# Patient Record
Sex: FEMALE | Race: WHITE | NOT HISPANIC OR LATINO | Employment: OTHER | URBAN - NONMETROPOLITAN AREA
[De-identification: names, ages, dates, MRNs, and addresses within clinical notes are randomized per-mention and may not be internally consistent; named-entity substitution may affect disease eponyms.]

---

## 2017-04-20 ENCOUNTER — APPOINTMENT (OUTPATIENT)
Dept: GENERAL RADIOLOGY | Facility: HOSPITAL | Age: 77
End: 2017-04-20

## 2017-04-20 ENCOUNTER — HOSPITAL ENCOUNTER (EMERGENCY)
Facility: HOSPITAL | Age: 77
Discharge: HOME | End: 2017-04-20
Attending: EMERGENCY MEDICINE | Admitting: EMERGENCY MEDICINE

## 2017-04-20 ENCOUNTER — APPOINTMENT (OUTPATIENT)
Dept: CT IMAGING | Facility: HOSPITAL | Age: 77
End: 2017-04-20

## 2017-04-20 VITALS
HEIGHT: 59 IN | TEMPERATURE: 98.9 F | BODY MASS INDEX: 37.29 KG/M2 | HEART RATE: 62 BPM | DIASTOLIC BLOOD PRESSURE: 68 MMHG | OXYGEN SATURATION: 97 % | SYSTOLIC BLOOD PRESSURE: 145 MMHG | WEIGHT: 185 LBS | RESPIRATION RATE: 18 BRPM

## 2017-04-20 DIAGNOSIS — N20.0 RENAL STONE: ICD-10-CM

## 2017-04-20 DIAGNOSIS — N28.1 RENAL CYST: ICD-10-CM

## 2017-04-20 DIAGNOSIS — D25.1 FIBROIDS, INTRAMURAL: ICD-10-CM

## 2017-04-20 DIAGNOSIS — K52.9 ENTERITIS: ICD-10-CM

## 2017-04-20 DIAGNOSIS — R11.0 NAUSEA: Primary | ICD-10-CM

## 2017-04-20 LAB
ALBUMIN SERPL-MCNC: 3.6 G/DL (ref 3.5–5)
ALBUMIN/GLOB SERPL: 1.6 G/DL (ref 1.1–2.5)
ALP SERPL-CCNC: 89 U/L (ref 24–120)
ALT SERPL W P-5'-P-CCNC: 20 U/L (ref 0–54)
AMYLASE SERPL-CCNC: 57 U/L (ref 30–110)
ANION GAP SERPL CALCULATED.3IONS-SCNC: 13 MMOL/L (ref 4–13)
APTT PPP: 28 SECONDS (ref 24.1–34.8)
AST SERPL-CCNC: 27 U/L (ref 7–45)
BASOPHILS # BLD AUTO: 0.01 10*3/MM3 (ref 0–0.2)
BASOPHILS NFR BLD AUTO: 0.2 % (ref 0–2)
BILIRUB SERPL-MCNC: 0.8 MG/DL (ref 0.1–1)
BILIRUB UR QL STRIP: NEGATIVE
BUN BLD-MCNC: 18 MG/DL (ref 5–21)
BUN/CREAT SERPL: 18.6 (ref 7–25)
CALCIUM SPEC-SCNC: 9.4 MG/DL (ref 8.4–10.4)
CHLORIDE SERPL-SCNC: 102 MMOL/L (ref 98–110)
CLARITY UR: CLEAR
CO2 SERPL-SCNC: 27 MMOL/L (ref 24–31)
COLOR UR: YELLOW
CREAT BLD-MCNC: 0.97 MG/DL (ref 0.5–1.4)
D-LACTATE SERPL-SCNC: 0.9 MMOL/L (ref 0.5–2)
DEPRECATED RDW RBC AUTO: 48.3 FL (ref 40–54)
EOSINOPHIL # BLD AUTO: 0.06 10*3/MM3 (ref 0–0.7)
EOSINOPHIL NFR BLD AUTO: 1.1 % (ref 0–4)
ERYTHROCYTE [DISTWIDTH] IN BLOOD BY AUTOMATED COUNT: 14.1 % (ref 12–15)
GFR SERPL CREATININE-BSD FRML MDRD: 56 ML/MIN/1.73
GLOBULIN UR ELPH-MCNC: 2.3 GM/DL
GLUCOSE BLD-MCNC: 84 MG/DL (ref 70–100)
GLUCOSE UR STRIP-MCNC: NEGATIVE MG/DL
HCT VFR BLD AUTO: 39.9 % (ref 37–47)
HGB BLD-MCNC: 13.1 G/DL (ref 12–16)
HGB UR QL STRIP.AUTO: NEGATIVE
IMM GRANULOCYTES # BLD: 0.01 10*3/MM3 (ref 0–0.03)
IMM GRANULOCYTES NFR BLD: 0.2 % (ref 0–5)
INR PPP: 0.92 (ref 0.91–1.09)
KETONES UR QL STRIP: NEGATIVE
LEUKOCYTE ESTERASE UR QL STRIP.AUTO: NEGATIVE
LIPASE SERPL-CCNC: 81 U/L (ref 23–203)
LYMPHOCYTES # BLD AUTO: 0.76 10*3/MM3 (ref 0.72–4.86)
LYMPHOCYTES NFR BLD AUTO: 14.1 % (ref 15–45)
MCH RBC QN AUTO: 30.8 PG (ref 28–32)
MCHC RBC AUTO-ENTMCNC: 32.8 G/DL (ref 33–36)
MCV RBC AUTO: 93.9 FL (ref 82–98)
MONOCYTES # BLD AUTO: 0.31 10*3/MM3 (ref 0.19–1.3)
MONOCYTES NFR BLD AUTO: 5.8 % (ref 4–12)
NEUTROPHILS # BLD AUTO: 4.23 10*3/MM3 (ref 1.87–8.4)
NEUTROPHILS NFR BLD AUTO: 78.6 % (ref 39–78)
NITRITE UR QL STRIP: NEGATIVE
PH UR STRIP.AUTO: 6.5 [PH] (ref 5–8)
PLATELET # BLD AUTO: 171 10*3/MM3 (ref 130–400)
PMV BLD AUTO: 11.3 FL (ref 6–12)
POTASSIUM BLD-SCNC: 3.5 MMOL/L (ref 3.5–5.3)
PROT SERPL-MCNC: 5.9 G/DL (ref 6.3–8.7)
PROT UR QL STRIP: NEGATIVE
PROTHROMBIN TIME: 12.6 SECONDS (ref 11.9–14.6)
RBC # BLD AUTO: 4.25 10*6/MM3 (ref 4.2–5.4)
SODIUM BLD-SCNC: 142 MMOL/L (ref 135–145)
SP GR UR STRIP: 1.01 (ref 1–1.03)
TROPONIN I SERPL-MCNC: 0 NG/ML (ref 0–0.07)
UROBILINOGEN UR QL STRIP: NORMAL
WBC NRBC COR # BLD: 5.38 10*3/MM3 (ref 4.8–10.8)

## 2017-04-20 PROCEDURE — 99285 EMERGENCY DEPT VISIT HI MDM: CPT

## 2017-04-20 PROCEDURE — 71010 HC CHEST PA OR AP: CPT

## 2017-04-20 PROCEDURE — 83605 ASSAY OF LACTIC ACID: CPT | Performed by: EMERGENCY MEDICINE

## 2017-04-20 PROCEDURE — 85610 PROTHROMBIN TIME: CPT | Performed by: EMERGENCY MEDICINE

## 2017-04-20 PROCEDURE — 96361 HYDRATE IV INFUSION ADD-ON: CPT

## 2017-04-20 PROCEDURE — 0 IOPAMIDOL PER 1 ML: Performed by: EMERGENCY MEDICINE

## 2017-04-20 PROCEDURE — 25010000002 ONDANSETRON PER 1 MG: Performed by: EMERGENCY MEDICINE

## 2017-04-20 PROCEDURE — 83690 ASSAY OF LIPASE: CPT | Performed by: EMERGENCY MEDICINE

## 2017-04-20 PROCEDURE — 96374 THER/PROPH/DIAG INJ IV PUSH: CPT

## 2017-04-20 PROCEDURE — 93010 ELECTROCARDIOGRAM REPORT: CPT | Performed by: INTERNAL MEDICINE

## 2017-04-20 PROCEDURE — 81003 URINALYSIS AUTO W/O SCOPE: CPT | Performed by: EMERGENCY MEDICINE

## 2017-04-20 PROCEDURE — 74177 CT ABD & PELVIS W/CONTRAST: CPT

## 2017-04-20 PROCEDURE — 80053 COMPREHEN METABOLIC PANEL: CPT | Performed by: EMERGENCY MEDICINE

## 2017-04-20 PROCEDURE — 84484 ASSAY OF TROPONIN QUANT: CPT

## 2017-04-20 PROCEDURE — 85730 THROMBOPLASTIN TIME PARTIAL: CPT | Performed by: EMERGENCY MEDICINE

## 2017-04-20 PROCEDURE — 36415 COLL VENOUS BLD VENIPUNCTURE: CPT

## 2017-04-20 PROCEDURE — 93005 ELECTROCARDIOGRAM TRACING: CPT | Performed by: EMERGENCY MEDICINE

## 2017-04-20 PROCEDURE — 85025 COMPLETE CBC W/AUTO DIFF WBC: CPT | Performed by: EMERGENCY MEDICINE

## 2017-04-20 PROCEDURE — 82150 ASSAY OF AMYLASE: CPT | Performed by: EMERGENCY MEDICINE

## 2017-04-20 RX ORDER — ONDANSETRON 2 MG/ML
4 INJECTION INTRAMUSCULAR; INTRAVENOUS ONCE
Status: COMPLETED | OUTPATIENT
Start: 2017-04-20 | End: 2017-04-20

## 2017-04-20 RX ORDER — ONDANSETRON 4 MG/1
4 TABLET, FILM COATED ORAL EVERY 6 HOURS
Qty: 15 TABLET | Refills: 0 | Status: SHIPPED | OUTPATIENT
Start: 2017-04-20 | End: 2019-09-10

## 2017-04-20 RX ADMIN — IOPAMIDOL 100 ML: 755 INJECTION, SOLUTION INTRAVENOUS at 11:30

## 2017-04-20 RX ADMIN — SODIUM CHLORIDE 1000 ML: 9 INJECTION, SOLUTION INTRAVENOUS at 13:06

## 2017-04-20 RX ADMIN — ONDANSETRON 4 MG: 2 INJECTION INTRAMUSCULAR; INTRAVENOUS at 12:57

## 2017-04-20 NOTE — ED PROVIDER NOTES
Subjective   Patient is a 76 y.o. female presenting with vomiting.   Vomiting   The primary symptoms include fatigue, abdominal pain, nausea, vomiting and melena. Primary symptoms do not include fever, weight loss, hematemesis, jaundice, hematochezia, dysuria, arthralgias or rash. The illness began 3 to 5 days ago. The problem has not changed since onset.  The illness is also significant for anorexia. The illness does not include chills, dysphagia, odynophagia, bloating, constipation, tenesmus, back pain or itching. Significant associated medical issues include bowel resection. Associated medical issues do not include inflammatory bowel disease, GERD, gallstones, liver disease, alcohol abuse, PUD, gastric bypass, irritable bowel syndrome, hemorrhoids or diverticulitis.       Review of Systems   Constitutional: Positive for fatigue. Negative for chills, fever and weight loss.   HENT: Negative.    Eyes: Negative.    Respiratory: Negative.    Cardiovascular: Negative.    Gastrointestinal: Positive for abdominal pain, anorexia, melena, nausea and vomiting. Negative for bloating, constipation, dysphagia, hematemesis, hematochezia and jaundice.   Endocrine: Negative.    Genitourinary: Negative.  Negative for dysuria.   Musculoskeletal: Negative for arthralgias and back pain.   Skin: Negative.  Negative for itching and rash.   Neurological: Negative.    Hematological: Negative.    All other systems reviewed and are negative.      Past Medical History:   Diagnosis Date   • Asthma    • Cellulitis    • Diabetes mellitus    • Disease of thyroid gland    • Hypercholesteremia    • Hypertension        Allergies   Allergen Reactions   • Spiractone [Spironolactone] Nausea And Vomiting   • Ampicillin Rash   • Penicillins Rash       Past Surgical History:   Procedure Laterality Date   • ADENOIDECTOMY     • ANKLE SURGERY Left    • CHOLECYSTECTOMY     • TONSILLECTOMY     • TUBAL ABDOMINAL LIGATION         History reviewed. No pertinent  family history.    Social History     Social History   • Marital status:      Spouse name: N/A   • Number of children: N/A   • Years of education: N/A     Social History Main Topics   • Smoking status: Never Smoker   • Smokeless tobacco: None   • Alcohol use No   • Drug use: No   • Sexual activity: Defer     Other Topics Concern   • None     Social History Narrative   • None           Objective   Physical Exam   Constitutional: She is oriented to person, place, and time. She appears well-developed and well-nourished.  Non-toxic appearance.   HENT:   Head: Normocephalic and atraumatic.   Mouth/Throat: Oropharynx is clear and moist.   Eyes: Conjunctivae are normal. Pupils are equal, round, and reactive to light.   Neck: Normal range of motion. Neck supple. No hepatojugular reflux and no JVD present.   Cardiovascular: Normal rate, regular rhythm, normal heart sounds and intact distal pulses.  PMI is not displaced.  Exam reveals no decreased pulses.    No murmur heard.  Pulmonary/Chest: Effort normal and breath sounds normal. No accessory muscle usage. No apnea. No respiratory distress. She has no decreased breath sounds. She has no wheezes.   Abdominal: Normal appearance, normal aorta and bowel sounds are normal. She exhibits no shifting dullness, no distension, no fluid wave, no abdominal bruit, no ascites, no pulsatile midline mass and no mass. There is no tenderness. There is no guarding.   Musculoskeletal: Normal range of motion.   Neurological: She is alert and oriented to person, place, and time. She has normal strength and normal reflexes. No cranial nerve deficit. GCS eye subscore is 4. GCS verbal subscore is 5. GCS motor subscore is 6.   Skin: Skin is warm and dry.   Psychiatric: She has a normal mood and affect. Her behavior is normal.   Nursing note and vitals reviewed.      Procedures         ED Course  ED Course   Comment By Time   Normal sinus rhythm with intraventricular conduction delay with left  anterior fascicular block Carlos Gonzalez MD 04/20 1355   Patient's workup is essentially negative she her low protein urine is normal Carlos Gonzalez MD 04/20 1616   Pleural-based mass right lateral midlung less conspicuous compared to  the 2016 examination.  I discussed this with the patient and she is aware of this and this has been looked at the CT scan in the past Carlos Gonzalez MD 04/20 1617   There is a 1 cm cyst in the upper pole right kidney. There are  several stones in the left kidney with the largest measuring about 4 mm  in size.  5. Fluid-filled loops of small bowel and colon with scattered air-fluid  levels could be due to enteritis. No small bowel distention is seen. Carlos Gonzalez MD 04/20 1618   Right femoral hernia containing a small pocket of fluid. This is  stable compared to the prior study. The distal tip of the appendix  extends towards the right femoral hernia.  8. There are 2 masses in the myometrium of the uterus consistent with  leiomyomas. Carlos Gonzalez MD 04/20 1618   discussed the patient and she will follow up she does not want Hemoccult stool performed today she has and follow-up with her primary M.D. in the whole discharge her home Carlos Gonzalez MD 04/20 1618   No pain no discomfort at this time Carlos Gonzalez MD 04/20 1619   Patient was informed about the CT scan reports Carlos Gonzalez MD 04/20 1619                  MDM    Final diagnoses:   Nausea   Enteritis   Renal cyst   Renal stone   Fibroids, intramural            Carlos Gonzalez MD  04/20/17 1622

## 2017-08-18 ENCOUNTER — TRANSCRIBE ORDERS (OUTPATIENT)
Dept: ADMINISTRATIVE | Facility: HOSPITAL | Age: 77
End: 2017-08-18

## 2017-08-18 DIAGNOSIS — M25.551 PAIN OF RIGHT HIP JOINT: Primary | ICD-10-CM

## 2017-08-18 DIAGNOSIS — M54.41 ACUTE RIGHT-SIDED LOW BACK PAIN WITH RIGHT-SIDED SCIATICA: ICD-10-CM

## 2017-08-18 DIAGNOSIS — M54.31 SCIATICA OF RIGHT SIDE: ICD-10-CM

## 2017-08-21 ENCOUNTER — HOSPITAL ENCOUNTER (OUTPATIENT)
Dept: MRI IMAGING | Facility: HOSPITAL | Age: 77
Discharge: HOME | End: 2017-08-21
Attending: FAMILY MEDICINE

## 2017-08-21 DIAGNOSIS — M54.31 SCIATICA OF RIGHT SIDE: ICD-10-CM

## 2017-08-21 DIAGNOSIS — M54.41 ACUTE RIGHT-SIDED LOW BACK PAIN WITH RIGHT-SIDED SCIATICA: ICD-10-CM

## 2017-08-21 DIAGNOSIS — M25.551 PAIN OF RIGHT HIP JOINT: ICD-10-CM

## 2018-02-05 ENCOUNTER — HOSPITAL ENCOUNTER (INPATIENT)
Facility: HOSPITAL | Age: 78
LOS: 4 days | Discharge: NURSING, REHAB OR LONG-TERM CARE | End: 2018-02-09
Attending: FAMILY MEDICINE | Admitting: FAMILY MEDICINE

## 2018-02-05 DIAGNOSIS — Z74.09 IMPAIRED MOBILITY AND ADLS: ICD-10-CM

## 2018-02-05 DIAGNOSIS — Z74.09 IMPAIRED FUNCTIONAL MOBILITY, BALANCE, GAIT, AND ENDURANCE: ICD-10-CM

## 2018-02-05 DIAGNOSIS — Z78.9 IMPAIRED MOBILITY AND ADLS: ICD-10-CM

## 2018-02-05 PROBLEM — J45.901 ASTHMA ATTACK: Status: ACTIVE | Noted: 2018-02-05

## 2018-02-05 LAB
CRP SERPL-MCNC: <0.5 MG/DL (ref 0–0.99)
D DIMER PPP FEU-MCNC: 0.63 MG/L (FEU) (ref 0–0.5)
NT-PROBNP SERPL-MCNC: 156 PG/ML (ref 0–1800)
PROCALCITONIN SERPL-MCNC: <0.25 NG/ML
TROPONIN I SERPL-MCNC: <0.012 NG/ML (ref 0–0.03)

## 2018-02-05 PROCEDURE — 94640 AIRWAY INHALATION TREATMENT: CPT

## 2018-02-05 PROCEDURE — 83880 ASSAY OF NATRIURETIC PEPTIDE: CPT | Performed by: FAMILY MEDICINE

## 2018-02-05 PROCEDURE — 85379 FIBRIN DEGRADATION QUANT: CPT | Performed by: FAMILY MEDICINE

## 2018-02-05 PROCEDURE — 25010000002 LEVOFLOXACIN PER 250 MG: Performed by: FAMILY MEDICINE

## 2018-02-05 PROCEDURE — 84484 ASSAY OF TROPONIN QUANT: CPT | Performed by: FAMILY MEDICINE

## 2018-02-05 PROCEDURE — 94799 UNLISTED PULMONARY SVC/PX: CPT

## 2018-02-05 PROCEDURE — 25010000002 METHYLPREDNISOLONE PER 40 MG: Performed by: FAMILY MEDICINE

## 2018-02-05 PROCEDURE — 86140 C-REACTIVE PROTEIN: CPT | Performed by: FAMILY MEDICINE

## 2018-02-05 PROCEDURE — 93005 ELECTROCARDIOGRAM TRACING: CPT | Performed by: FAMILY MEDICINE

## 2018-02-05 PROCEDURE — 84145 PROCALCITONIN (PCT): CPT | Performed by: FAMILY MEDICINE

## 2018-02-05 PROCEDURE — 25010000002 ENOXAPARIN PER 10 MG: Performed by: FAMILY MEDICINE

## 2018-02-05 RX ORDER — LEVOFLOXACIN 5 MG/ML
500 INJECTION, SOLUTION INTRAVENOUS EVERY 24 HOURS
Status: DISCONTINUED | OUTPATIENT
Start: 2018-02-05 | End: 2018-02-07

## 2018-02-05 RX ORDER — POTASSIUM CHLORIDE 750 MG/1
10 CAPSULE, EXTENDED RELEASE ORAL 2 TIMES DAILY
Status: DISCONTINUED | OUTPATIENT
Start: 2018-02-05 | End: 2018-02-09 | Stop reason: HOSPADM

## 2018-02-05 RX ORDER — AMLODIPINE BESYLATE AND BENAZEPRIL HYDROCHLORIDE 5; 20 MG/1; MG/1
1 CAPSULE ORAL EVERY 24 HOURS
COMMUNITY
End: 2018-02-09 | Stop reason: HOSPADM

## 2018-02-05 RX ORDER — MONTELUKAST SODIUM 10 MG/1
1 TABLET ORAL NIGHTLY
COMMUNITY

## 2018-02-05 RX ORDER — MONTELUKAST SODIUM 10 MG/1
10 TABLET ORAL EVERY 24 HOURS
Status: DISCONTINUED | OUTPATIENT
Start: 2018-02-05 | End: 2018-02-06 | Stop reason: SDUPTHER

## 2018-02-05 RX ORDER — FAMOTIDINE 20 MG/1
20 TABLET, FILM COATED ORAL 2 TIMES DAILY
Status: DISCONTINUED | OUTPATIENT
Start: 2018-02-05 | End: 2018-02-09 | Stop reason: HOSPADM

## 2018-02-05 RX ORDER — LEVOTHYROXINE SODIUM 0.1 MG/1
100 TABLET ORAL EVERY 24 HOURS
Status: DISCONTINUED | OUTPATIENT
Start: 2018-02-05 | End: 2018-02-09 | Stop reason: HOSPADM

## 2018-02-05 RX ORDER — FUROSEMIDE 20 MG/1
1 TABLET ORAL EVERY 24 HOURS
COMMUNITY
End: 2018-02-09 | Stop reason: HOSPADM

## 2018-02-05 RX ORDER — ALBUTEROL SULFATE 2.5 MG/3ML
3 SOLUTION RESPIRATORY (INHALATION) EVERY 4 HOURS PRN
COMMUNITY
Start: 2015-10-21

## 2018-02-05 RX ORDER — BENZONATATE 100 MG/1
200 CAPSULE ORAL 3 TIMES DAILY PRN
Status: DISCONTINUED | OUTPATIENT
Start: 2018-02-05 | End: 2018-02-09 | Stop reason: HOSPADM

## 2018-02-05 RX ORDER — METHYLPREDNISOLONE SODIUM SUCCINATE 40 MG/ML
30 INJECTION, POWDER, LYOPHILIZED, FOR SOLUTION INTRAMUSCULAR; INTRAVENOUS EVERY 12 HOURS
Status: DISCONTINUED | OUTPATIENT
Start: 2018-02-05 | End: 2018-02-07

## 2018-02-05 RX ORDER — BUMETANIDE 1 MG/1
1 TABLET ORAL 2 TIMES DAILY
Status: ON HOLD | COMMUNITY
End: 2018-05-17

## 2018-02-05 RX ORDER — PRAVASTATIN SODIUM 40 MG
1 TABLET ORAL DAILY
COMMUNITY
End: 2019-09-10

## 2018-02-05 RX ORDER — BENAZEPRIL HYDROCHLORIDE 10 MG/1
10 TABLET ORAL DAILY
COMMUNITY
End: 2019-09-10

## 2018-02-05 RX ORDER — LEVOTHYROXINE SODIUM 0.12 MG/1
125 TABLET ORAL DAILY
COMMUNITY

## 2018-02-05 RX ORDER — LISINOPRIL 20 MG/1
20 TABLET ORAL
Status: DISCONTINUED | OUTPATIENT
Start: 2018-02-05 | End: 2018-02-09 | Stop reason: HOSPADM

## 2018-02-05 RX ORDER — ALBUTEROL SULFATE 2.5 MG/3ML
2.5 SOLUTION RESPIRATORY (INHALATION) EVERY 6 HOURS PRN
Status: DISCONTINUED | OUTPATIENT
Start: 2018-02-05 | End: 2018-02-09 | Stop reason: HOSPADM

## 2018-02-05 RX ORDER — ALBUTEROL SULFATE 2.5 MG/3ML
2.5 SOLUTION RESPIRATORY (INHALATION)
Status: DISCONTINUED | OUTPATIENT
Start: 2018-02-05 | End: 2018-02-09 | Stop reason: HOSPADM

## 2018-02-05 RX ORDER — ALBUTEROL SULFATE 90 UG/1
2 AEROSOL, METERED RESPIRATORY (INHALATION) 4 TIMES DAILY PRN
COMMUNITY
Start: 2015-10-21 | End: 2018-02-09 | Stop reason: HOSPADM

## 2018-02-05 RX ORDER — BUMETANIDE 1 MG/1
1 TABLET ORAL 2 TIMES DAILY
Status: DISCONTINUED | OUTPATIENT
Start: 2018-02-05 | End: 2018-02-09 | Stop reason: HOSPADM

## 2018-02-05 RX ORDER — ONDANSETRON 4 MG/1
4 TABLET, FILM COATED ORAL EVERY 6 HOURS
Status: DISCONTINUED | OUTPATIENT
Start: 2018-02-05 | End: 2018-02-09

## 2018-02-05 RX ORDER — TRAMADOL HYDROCHLORIDE 50 MG/1
50 TABLET ORAL 2 TIMES DAILY PRN
Status: DISCONTINUED | OUTPATIENT
Start: 2018-02-05 | End: 2018-02-09 | Stop reason: HOSPADM

## 2018-02-05 RX ORDER — TRAMADOL HYDROCHLORIDE 50 MG/1
50 TABLET ORAL 2 TIMES DAILY PRN
Status: ON HOLD | COMMUNITY
End: 2018-03-26

## 2018-02-05 RX ORDER — POTASSIUM CHLORIDE 750 MG/1
1 CAPSULE, EXTENDED RELEASE ORAL 2 TIMES DAILY
COMMUNITY

## 2018-02-05 RX ORDER — RANITIDINE 150 MG/1
150 TABLET ORAL EVERY 12 HOURS SCHEDULED
COMMUNITY
End: 2019-09-10

## 2018-02-05 RX ORDER — ATORVASTATIN CALCIUM 10 MG/1
10 TABLET, FILM COATED ORAL DAILY
Status: DISCONTINUED | OUTPATIENT
Start: 2018-02-05 | End: 2018-02-09 | Stop reason: HOSPADM

## 2018-02-05 RX ORDER — CLONIDINE HYDROCHLORIDE 0.2 MG/1
1 TABLET ORAL EVERY 12 HOURS SCHEDULED
COMMUNITY
End: 2018-02-09 | Stop reason: HOSPADM

## 2018-02-05 RX ADMIN — ATORVASTATIN CALCIUM 10 MG: 10 TABLET, FILM COATED ORAL at 20:11

## 2018-02-05 RX ADMIN — LEVOFLOXACIN 500 MG: 5 INJECTION, SOLUTION INTRAVENOUS at 20:16

## 2018-02-05 RX ADMIN — ONDANSETRON 4 MG: 4 TABLET, FILM COATED ORAL at 18:44

## 2018-02-05 RX ADMIN — POTASSIUM CHLORIDE 10 MEQ: 750 CAPSULE, EXTENDED RELEASE ORAL at 20:25

## 2018-02-05 RX ADMIN — FAMOTIDINE 20 MG: 20 TABLET, FILM COATED ORAL at 20:14

## 2018-02-05 RX ADMIN — BUMETANIDE 1 MG: 1 TABLET ORAL at 20:11

## 2018-02-05 RX ADMIN — METHYLPREDNISOLONE SODIUM SUCCINATE 30 MG: 40 INJECTION, POWDER, FOR SOLUTION INTRAMUSCULAR; INTRAVENOUS at 18:45

## 2018-02-05 RX ADMIN — TRAMADOL HYDROCHLORIDE 50 MG: 50 TABLET, COATED ORAL at 18:44

## 2018-02-05 RX ADMIN — ENOXAPARIN SODIUM 40 MG: 40 INJECTION SUBCUTANEOUS at 18:45

## 2018-02-05 RX ADMIN — LEVOTHYROXINE SODIUM 100 MCG: 100 TABLET ORAL at 20:15

## 2018-02-05 RX ADMIN — ALBUTEROL SULFATE 2.5 MG: 2.5 SOLUTION RESPIRATORY (INHALATION) at 21:01

## 2018-02-05 RX ADMIN — MONTELUKAST SODIUM 10 MG: 10 TABLET, FILM COATED ORAL at 20:14

## 2018-02-05 NOTE — PLAN OF CARE
Problem: Patient Care Overview (Adult)  Goal: Plan of Care Review  Outcome: Ongoing (interventions implemented as appropriate)   02/05/18 1338   Coping/Psychosocial Response Interventions   Plan Of Care Reviewed With patient   Patient Care Overview   Progress no change   Pt admitted for cellulitis. Pt complains of pain in bilateral lower legs. Up with assist X2. BSC. Wheelchair.    Goal: Adult Individualization and Mutuality  Outcome: Ongoing (interventions implemented as appropriate)   02/05/18 1338   Individualization   Patient Specific Preferences Likes room warm    Patient Specific Goals To go to [facility] upon discharge    Patient Specific Interventions None at this time    Mutuality/Individual Preferences   What Anxieties, Fears or Concerns Do You Have About Your Health or Care? My legs are so swollen    What Questions Do You Have About Your Health or Care? None at this time    What Information Would Help Us Give You More Personalized Care? None at this time      Goal: Discharge Needs Assessment  Outcome: Ongoing (interventions implemented as appropriate)   02/05/18 1338   Discharge Needs Assessment   Concerns To Be Addressed discharge planning concerns   Concerns Comments pt wants to go to [facility] upon discharge   Readmission Within The Last 30 Days no previous admission in last 30 days   Equipment Needed After Discharge none   Current Health   Anticipated Changes Related to Illness none   Self-Care   Equipment Currently Used at Home raised toilet;walker, rolling;wheelchair       Problem: Cellulitis (Adult)  Goal: Signs and Symptoms of Listed Potential Problems Will be Absent or Manageable (Cellulitis)  Outcome: Ongoing (interventions implemented as appropriate)   02/05/18 1338   Cellulitis   Problems Assessed (Cellulitis) all   Problems Present (Cellulitis) pain

## 2018-02-06 ENCOUNTER — APPOINTMENT (OUTPATIENT)
Dept: ULTRASOUND IMAGING | Facility: HOSPITAL | Age: 78
End: 2018-02-06

## 2018-02-06 ENCOUNTER — APPOINTMENT (OUTPATIENT)
Dept: GENERAL RADIOLOGY | Facility: HOSPITAL | Age: 78
End: 2018-02-06

## 2018-02-06 LAB
FLUAV AG NPH QL: NEGATIVE
FLUBV AG NPH QL IA: NEGATIVE

## 2018-02-06 PROCEDURE — 93010 ELECTROCARDIOGRAM REPORT: CPT | Performed by: INTERNAL MEDICINE

## 2018-02-06 PROCEDURE — 97110 THERAPEUTIC EXERCISES: CPT

## 2018-02-06 PROCEDURE — 25010000002 LEVOFLOXACIN PER 250 MG: Performed by: FAMILY MEDICINE

## 2018-02-06 PROCEDURE — G8987 SELF CARE CURRENT STATUS: HCPCS

## 2018-02-06 PROCEDURE — 94799 UNLISTED PULMONARY SVC/PX: CPT

## 2018-02-06 PROCEDURE — 25010000002 ENOXAPARIN PER 10 MG: Performed by: FAMILY MEDICINE

## 2018-02-06 PROCEDURE — 97166 OT EVAL MOD COMPLEX 45 MIN: CPT

## 2018-02-06 PROCEDURE — G8988 SELF CARE GOAL STATUS: HCPCS

## 2018-02-06 PROCEDURE — 25010000002 METHYLPREDNISOLONE PER 40 MG: Performed by: FAMILY MEDICINE

## 2018-02-06 PROCEDURE — 81332 SERPINA1 GENE: CPT | Performed by: FAMILY MEDICINE

## 2018-02-06 PROCEDURE — G8978 MOBILITY CURRENT STATUS: HCPCS

## 2018-02-06 PROCEDURE — 93970 EXTREMITY STUDY: CPT

## 2018-02-06 PROCEDURE — 97162 PT EVAL MOD COMPLEX 30 MIN: CPT

## 2018-02-06 PROCEDURE — 97116 GAIT TRAINING THERAPY: CPT

## 2018-02-06 PROCEDURE — 87804 INFLUENZA ASSAY W/OPTIC: CPT | Performed by: FAMILY MEDICINE

## 2018-02-06 PROCEDURE — 71046 X-RAY EXAM CHEST 2 VIEWS: CPT

## 2018-02-06 PROCEDURE — G8979 MOBILITY GOAL STATUS: HCPCS

## 2018-02-06 PROCEDURE — 93970 EXTREMITY STUDY: CPT | Performed by: SURGERY

## 2018-02-06 RX ORDER — MONTELUKAST SODIUM 10 MG/1
10 TABLET ORAL NIGHTLY
Status: DISCONTINUED | OUTPATIENT
Start: 2018-02-06 | End: 2018-02-09 | Stop reason: HOSPADM

## 2018-02-06 RX ORDER — DIPHENHYDRAMINE HCL 25 MG
25 CAPSULE ORAL EVERY 6 HOURS PRN
Status: DISCONTINUED | OUTPATIENT
Start: 2018-02-06 | End: 2018-02-09 | Stop reason: HOSPADM

## 2018-02-06 RX ADMIN — METHYLPREDNISOLONE SODIUM SUCCINATE 30 MG: 40 INJECTION, POWDER, FOR SOLUTION INTRAMUSCULAR; INTRAVENOUS at 06:06

## 2018-02-06 RX ADMIN — LEVOFLOXACIN 500 MG: 5 INJECTION, SOLUTION INTRAVENOUS at 18:08

## 2018-02-06 RX ADMIN — LISINOPRIL 20 MG: 20 TABLET ORAL at 09:55

## 2018-02-06 RX ADMIN — FAMOTIDINE 20 MG: 20 TABLET, FILM COATED ORAL at 20:33

## 2018-02-06 RX ADMIN — BUMETANIDE 1 MG: 1 TABLET ORAL at 20:33

## 2018-02-06 RX ADMIN — METHYLPREDNISOLONE SODIUM SUCCINATE 30 MG: 40 INJECTION, POWDER, FOR SOLUTION INTRAMUSCULAR; INTRAVENOUS at 18:09

## 2018-02-06 RX ADMIN — ALBUTEROL SULFATE 2.5 MG: 2.5 SOLUTION RESPIRATORY (INHALATION) at 07:18

## 2018-02-06 RX ADMIN — ALBUTEROL SULFATE 2.5 MG: 2.5 SOLUTION RESPIRATORY (INHALATION) at 10:36

## 2018-02-06 RX ADMIN — ENOXAPARIN SODIUM 40 MG: 40 INJECTION SUBCUTANEOUS at 18:09

## 2018-02-06 RX ADMIN — POTASSIUM CHLORIDE 10 MEQ: 750 CAPSULE, EXTENDED RELEASE ORAL at 09:55

## 2018-02-06 RX ADMIN — BUMETANIDE 1 MG: 1 TABLET ORAL at 09:55

## 2018-02-06 RX ADMIN — FAMOTIDINE 20 MG: 20 TABLET, FILM COATED ORAL at 09:55

## 2018-02-06 RX ADMIN — MONTELUKAST SODIUM 10 MG: 10 TABLET, FILM COATED ORAL at 20:33

## 2018-02-06 RX ADMIN — ALBUTEROL SULFATE 2.5 MG: 2.5 SOLUTION RESPIRATORY (INHALATION) at 15:16

## 2018-02-06 RX ADMIN — POTASSIUM CHLORIDE 10 MEQ: 750 CAPSULE, EXTENDED RELEASE ORAL at 20:33

## 2018-02-06 NOTE — PLAN OF CARE
Problem: Patient Care Overview (Adult)  Goal: Plan of Care Review  Outcome: Ongoing (interventions implemented as appropriate)   02/05/18 1338 02/05/18 2000   Coping/Psychosocial Response Interventions   Plan Of Care Reviewed With --  patient   Patient Care Overview   Progress no change --      Goal: Adult Individualization and Mutuality  Outcome: Ongoing (interventions implemented as appropriate)    Goal: Discharge Needs Assessment  Outcome: Ongoing (interventions implemented as appropriate)      Problem: Cellulitis (Adult)  Goal: Signs and Symptoms of Listed Potential Problems Will be Absent or Manageable (Cellulitis)  Outcome: Ongoing (interventions implemented as appropriate)

## 2018-02-06 NOTE — PLAN OF CARE
Problem: Patient Care Overview (Adult)  Goal: Plan of Care Review  Outcome: Ongoing (interventions implemented as appropriate)  Pt has some lower extremity edema legs elevated po bumex given therapy ordered and working with pt no c/o pain this shift  Goal: Adult Individualization and Mutuality  Outcome: Ongoing (interventions implemented as appropriate)      Problem: Cellulitis (Adult)  Goal: Signs and Symptoms of Listed Potential Problems Will be Absent or Manageable (Cellulitis)  Outcome: Ongoing (interventions implemented as appropriate)

## 2018-02-06 NOTE — H&P
[facility]  History and Physical    Patient:  Carlotta Ortiz  MRN: [id number]    CHIEF COMPLAINT:  SOB/WEAKNESS    History Obtained From: the patient   PCP: Ken Brady MD    HISTORY OF PRESENT ILLNESS:   The patient is a 77 y.o. female who presents with complaints of two week history of progressive WEBB, weakness, falls and worsening swelling in feet.  Admitted with asthma exacerbation and cellulitis of lle.      REVIEW OF SYSTEMS:    Constitutional: Negative for activity change, appetite change, chills, fatigue and fever.   HENT: Negative.  Negative for congestion, sinus pressure and sore throat.    Eyes: Negative for pain and discharge.   Respiratory: POSITIVE for cough, chest tightness, shortness of breath and wheezing.    Cardiovascular: Negative for chest pain and leg swelling.   Gastrointestinal: Negative for abdominal distention, abdominal pain, diarrhea, nausea and vomiting.   Genitourinary: Negative for difficulty urinating, flank pain, hematuria and urgency.   Musculoskeletal: Negative for arthralgias and back pain.   Skin: Negative for color change, pallor and rash.   Neurological: Negative for dizziness, syncope, numbness and headaches.   Psychiatric/Behavioral: Negative for agitation, behavioral problems and confusion.       Past Medical History:  Past Medical History:   Diagnosis Date   • Asthma    • Cellulitis    • Diabetes mellitus    • Disease of thyroid gland    • Hypercholesteremia    • Hypertension        Past Surgical History:  Past Surgical History:   Procedure Laterality Date   • ADENOIDECTOMY     • ANKLE SURGERY Left    • CHOLECYSTECTOMY     • TONSILLECTOMY     • TUBAL ABDOMINAL LIGATION         Medications Prior to Admission:    Prescriptions Prior to Admission   Medication Sig Dispense Refill Last Dose   • albuterol (PROVENTIL) (2.5 MG/3ML) 0.083% nebulizer solution Take 3 mL by nebulization Every 6 (Six) Hours As Needed for Wheezing or Shortness of Air.      •  albuterol (VENTOLIN HFA) 108 (90 Base) MCG/ACT inhaler Inhale 2 puffs 4 (Four) Times a Day As Needed for Wheezing or Shortness of Air.      • amLODIPine-benazepril (LOTREL 5-20) 5-20 MG per capsule Take 1 capsule by mouth Daily.      • benazepril (LOTENSIN) 20 MG tablet Take 20 mg by mouth Daily.      • bumetanide (BUMEX) 1 MG tablet Take 1 mg by mouth 2 (Two) Times a Day.      • CloNIDine (CATAPRES) 0.2 MG tablet Take 1 tablet by mouth Every 12 (Twelve) Hours.      • fluticasone-salmeterol (ADVAIR DISKUS) 500-50 MCG/DOSE DISKUS Inhale 1 puff Every 12 (Twelve) Hours.      • furosemide (LASIX) 20 MG tablet Take 1 tablet by mouth Daily.      • levothyroxine (SYNTHROID, LEVOTHROID) 125 MCG tablet Take 100 mcg by mouth Daily.      • montelukast (SINGULAIR) 10 MG tablet Take 1 tablet by mouth Daily.      • MULTIPLE VITAMIN PO Take 1 tablet by mouth Daily.      • ondansetron (ZOFRAN) 4 MG tablet Take 1 tablet by mouth Every 6 (Six) Hours. 15 tablet 0    • potassium chloride (MICRO-K) 10 MEQ CR capsule Take 1 capsule by mouth 2 (Two) Times a Day.      • pravastatin (PRAVACHOL) 40 MG tablet Take 1 tablet by mouth Daily.      • RaNITidine HCl (ZANTAC PO) Take 150 mg by mouth Every 12 (Twelve) Hours.      • traMADol (ULTRAM) 50 MG tablet Take 50 mg by mouth 2 (Two) Times a Day As Needed for Moderate Pain .          Allergies:  Spiractone [spironolactone]; Ampicillin; and Penicillins    Social History:   Social History     Social History   • Marital status:      Spouse name: N/A   • Number of children: N/A   • Years of education: N/A     Occupational History   • Not on file.     Social History Main Topics   • Smoking status: Never Smoker   • Smokeless tobacco: Not on file   • Alcohol use No   • Drug use: No   • Sexual activity: Defer     Other Topics Concern   • Not on file     Social History Narrative       Family History:   History reviewed. No pertinent family history.        Physical Exam:    Vitals: /52 (BP  "Location: Right arm, Patient Position: Lying)  Pulse 62  Temp 98.4 °F (36.9 °C) (Oral)   Resp 16  Ht 149.9 cm (59\")  Wt 84 kg (185 lb 3.2 oz)  LMP  (LMP Unknown)  SpO2 99%  BMI 37.41 kg/m2       General Appearance:    Alert, cooperative, in no acute distress   Head:    Normocephalic, without obvious abnormality, atraumatic   Eyes:            Lids and lashes normal, conjunctivae and sclerae normal, no   icterus, no pallor, corneas clear, PERRLA   Ears:    Ears appear intact with no abnormalities noted   Throat:   No oral lesions, no thrush, oral mucosa moist   Neck:   No adenopathy, supple, trachea midline, no thyromegaly, no   carotid bruit, no JVD   Back:     No kyphosis present, no scoliosis present, no skin lesions,      erythema or scars, no tenderness to percussion or                   palpation,   range of motion normal   Lungs:     BILATERAL WHEEZES WITH DIMINISHED AIR MVT IN BASES    Heart:    Regular rhythm and normal rate, normal S1 and S2, no            murmur, no gallop, no rub, no click   Chest Wall:    No abnormalities observed   Abdomen:     Normal bowel sounds, no masses, no organomegaly, soft        non-tender, non-distended, no guarding, no rebound                tenderness   Rectal:     Deferred   Extremities:   LLE ERYTHEMA AND 3 +EDEMA   Pulses:    Skin:    Lymph nodes:   No palpable adenopathy   Neurologic:   Cranial nerves 2 - 12 grossly intact, sensation intact, DTR       present and equal bilaterally       Lab Results (last 24 hours)     Procedure Component Value Units Date/Time    C-reactive Protein [[id number]]  (Normal) Collected:  02/05/18 1957    Specimen:  Blood Updated:  02/05/18 2016     C-Reactive Protein <0.50 mg/dL     D-dimer, Quantitative [[id number]]  (Abnormal) Collected:  02/05/18 1957    Specimen:  Blood Updated:  02/05/18 2017     D-Dimer, Quantitative 0.63 (H) mg/L (FEU)     Narrative:       Reference Range is 0-0.50 mg/L FEU. However, results <0.50 mg/L FEU tends to " rule out DVT or PE. Results >0.50 mg/L FEU are not useful in predicting absence or presence of DVT or PE.    Troponin [[id number]]  (Normal) Collected:  02/05/18 1957    Specimen:  Blood Updated:  02/05/18 2025     Troponin I <0.012 ng/mL     BNP [[id number]]  (Normal) Collected:  02/05/18 1957    Specimen:  Blood Updated:  02/05/18 2025     proBNP 156.0 pg/mL     Procalcitonin [[id number]]  (Normal) Collected:  02/05/18 1957    Specimen:  Blood Updated:  02/05/18 2044     Procalcitonin <0.25 ng/mL     Narrative:       SIRS, sepsis, severe sepsis, and septic shock are categorized according to the criteria of the consensus conference of the American College of Chest Physicians/Society of Critical Care Medicine.    PCT < 0.5 ng/mL     Systemic infection (sepsis) is not likely.    PCT >0.5 and < 2.0 ng/mL Systemic infection (sepsis) is possible, but other conditions are known to elevate PCT as well.    PCT > 2.0 ng/mL     Systemic infection (sepsis) is likely, unless other causes are known.      PCT > 10.0 ng/mL    Important systemic inflammatory response, almost exclusively due to severe bacterial sepsis or septic shock.    PCT values of < 0.5 ng/mL do not exclude an infection, because localized infections (without systemic signs) may be associated with such low concentrations, or a systemic infection in its initial stages (<6 hours).  Increased PCT can occur without infection.  PCT concentrations between 0.5 and 2.0 ng/mL should be interpreted taking into account the patients history.  It is recommended to retest PCT within 6-24 hours if any concentrations < 2.0 ng/mL are obtained.    Alpha - 1 - Antitrypsin Deficiency [[id number]] Collected:  02/06/18 0437    Specimen:  Blood Updated:  02/06/18 0519    Influenza Antigen, Rapid - Swab, Nasopharynx [[id number]]  (Normal) Collected:  02/06/18 0505    Specimen:  Swab from Nasopharynx Updated:  02/06/18 0537     Influenza A Ag, EIA Negative     Influenza B Ag, EIA Negative     Narrative:         Recommend confirmation of negative results by viral culture or molecular assay.           -----------------------------------------------------------------    Radiology:     Xr Chest Pa & Lateral    Result Date: 2/6/2018  EXAMINATION: XR CHEST PA AND LATERAL-  2/6/2018 10:07 AM EST  HISTORY: Shortness of breath. Asthma exacerbation.  There is no pneumonia or pneumothorax.  Cardiomegaly with aortic arch calcification.  Pleural-based mass within the right lower lung measures approximately 4.1 x 2.4 cm as compared with 5.3 x 1.5 cm previously. Similar appearance of 2.4 cm right hilar lymphadenopathy.  On 08/10/2016 the pleural-based mass measured approximately 5.8 x 3.2 cm. Essentially unchanged appearance of the right hilar nodule.  Underlying chronic interstitial lung disease.  Hyperexpansion.  Osteopenia with moderate thoracic spurring and moderate kyphosis.  There is no pleural effusion.  Summary: 1. Similar appearance of right lower lung pleural-based mass and right hilar nodule. 2. Chronic lung changes with no pneumonia, pneumothorax, pleural effusion, or heart failure. This report was finalized on 02/06/2018 09:24 by Dr. Vikram Mcallister MD.      Assessment and Plan   1.     Active Problems:    Asthma attack  CELLULITIS LLE  EDEMA LE BILAT  GENERALIZED WEAKNESS  AFTT    PLAN:    IV STEROID, ABX, NEBS  VENOUS SCAN LE BILAT  PT/OT  WILL NEED NHP      Ken Brady MD

## 2018-02-06 NOTE — THERAPY EVALUATION
Acute Care - Physical Therapy Initial Evaluation  [facility]     Patient Name: Carlotta Ortiz  : 1940  MRN: [id number]  Today's Date: 2018   Onset of Illness/Injury or Date of Surgery Date: (P) 18  Date of Referral to PT: 18  Referring Physician: (P) Dr. Brady      Admit Date: 2018     Visit Dx:    ICD-10-CM ICD-9-CM   1. Impaired functional mobility, balance, gait, and endurance Z74.09 V49.89   2. Impaired mobility and ADLs Z74.09 799.89     Patient Active Problem List   Diagnosis   • Asthma attack     Past Medical History:   Diagnosis Date   • Asthma    • Cellulitis    • Diabetes mellitus    • Disease of thyroid gland    • Hypercholesteremia    • Hypertension      Past Surgical History:   Procedure Laterality Date   • ADENOIDECTOMY     • ANKLE SURGERY Left    • CHOLECYSTECTOMY     • TONSILLECTOMY     • TUBAL ABDOMINAL LIGATION            PT ASSESSMENT (last 72 hours)      PT Evaluation       18 1059 18 1049    Rehab Evaluation    Document Type (P)  evaluation  - evaluation   see MAR  -PB (r) JM (t) PB (c)    Subjective Information (P)  agree to therapy;pain  -MK agree to therapy;complains of;pain  -PB (r) JM (t) PB (c)    Patient Effort, Rehab Treatment (P)  good  -MK good  -PB (r) JM (t) PB (c)    Symptoms Noted During/After Treatment  none  -PB (r) JM (t) PB (c)    General Information    Patient Profile Review (P)  yes  -MK yes  -PB (r) JM (t) PB (c)    Onset of Illness/Injury or Date of Surgery Date (P)  18  -MK 18  -PB (r) JM (t) PB (c)    Referring Physician (P)  Dr. Brady  - Dr. Brady  -PB (r) JM (t) PB (c)    General Observations (P)  supine in fowlers with legs elevated  -MK Pt supine in bed  -PB (r) JM (t) PB (c)    Pertinent History Of Current Problem (P)  asthma exacerbation, R hip pain, L knee bone on bone, low bone density due to hx of asthma drug usage  - Unsure as there is no H&P. Notes say BLE pain and cellulitis, asthma exacerbation and  SOB.  -PB (r) JM (t) PB (c)    Precautions/Limitations (P)  fall precautions  - fall precautions  -PB (r) JM (t) PB (c)    Prior Level of Function (P)  independent:;all household mobility;community mobility;mod assist:;ADL's   rollator for short distances   - independent:;all household mobility;community mobility;mod assist:;ADL's  -PB (r) JM (t) PB (c)    Equipment Currently Used at Home (P)  bath bench;wheelchair;rollator;hospital bed  - wheelchair;shower chair;rollator   hospital bed  -PB (r) JM (t) PB (c)    Plans/Goals Discussed With (P)  patient;agreed upon  - patient;agreed upon  -PB (r) JM (t) PB (c)    Risks Reviewed (P)  patient:;LOB;nausea/vomiting;dizziness;increased discomfort;lines disloged  - patient:;LOB;nausea/vomiting;dizziness;increased discomfort  -PB (r) JM (t) PB (c)    Benefits Reviewed (P)  patient:;improve function;increase independence;increase strength;increase balance;decrease pain;increase knowledge  - patient:;improve function;increase independence;increase strength;increase balance;increase knowledge  -PB (r) JM (t) PB (c)    Barriers to Rehab (P)  previous functional deficit;medically complex  - medically complex;physical barrier  -PB (r) JM (t) PB (c)    Living Environment    Lives With (P)  alone  - alone  -PB (r) JM (t) PB (c)    Living Arrangements (P)  apartment  - apartment  -PB (r) JM (t) PB (c)    Home Accessibility (P)  bed and bath on same level;ramps present at home;tub/shower is not walk in  - ramps present at home;tub/shower is not walk in   step to get into bed  -PB (r) JM (t) PB (c)    Stair Railings at Home  none  -PB (r) JM (t) PB (c)    Transportation Available  car;family or friend will provide  -PB (r) JM (t) PB (c)    Living Environment Comment (P)  daughter and granddaughter available to assist with ADL 5-7 days/week, providing sponge bath  -     Clinical Impression    Date of Referral to PT  02/05/18  -PB (r) JM (t) PB (c)    PT Diagnosis   limited mobility, decreased activity tolerance  -PB (r) JM (t) PB (c)    Functional Level At Time Of Evaluation  able to perform bed mobility SBA, transfer sit-stand-sit, and bed to [facility] CGAx2, ambulate to the door and back CGAx2  -PB (r) JM (t) PB (c)    Patient/Family Goals Statement  improve strength and function  -PB (r) JM (t) PB (c)    Criteria for Skilled Therapeutic Interventions Met  yes;treatment indicated  -PB (r) JM (t) PB (c)    Impairments Found (describe specific impairments)  aerobic capacity/endurance;muscle performance;gait, locomotion, and balance  -PB (r) JM (t) PB (c)    Rehab Potential  good, to achieve stated therapy goals  -PB (r) JM (t) PB (c)    Predicted Duration of Therapy Intervention (days/wks)  until d/c  -PB (r) JM (t) PB (c)    Pain Assessment    Pain Assessment (P)  Portillo-Feliz FACES  -MK Portillo-Feliz FACES  -PB (r) JM (t) PB (c)    Portillo-Feliz FACES Pain Rating (P)  4  -MK 4  -PB (r) JM (t) PB (c)    Pain Type (P)  Chronic pain  -MK Chronic pain  -PB (r) JM (t) PB (c)    Pain Location (P)  Hip  -MK Hip  -PB (r) JM (t) PB (c)    Pain Orientation (P)  Right  -MK Right  -PB (r) JM (t) PB (c)    Pain Descriptors (P)  Sharp  -MK Sharp  -PB (r) JM (t) PB (c)    Pain Frequency (P)  Constant/continuous  -MK Constant/continuous  -PB (r) JM (t) PB (c)    Pain Intervention(s) (P)  Repositioned;Ambulation/increased activity  -MK Medication (See MAR);Ambulation/increased activity  -PB (r) JM (t) PB (c)    Response to Interventions (P)  tolerated  -MK tolerated  -PB (r) JM (t) PB (c)    Multiple Pain Sites (P)  Yes  -MK Yes  -PB (r) JM (t) PB (c)    Pain 2    Portillo-Baker FACES Pain Rating 2 (P)  4   L knee  -MK 4   L knee  -PB (r) JM (t) PB (c)    Vision Assessment/Intervention    Visual Impairment (P)  WFL  -MK     Cognitive Assessment/Intervention    Current Cognitive/Communication Assessment (P)  functional  -MK functional  -PB (r) JM (t) PB (c)    Orientation Status (P)  oriented x 4  -MK oriented  x 4;person;place;time  -PB (r) JM (t) PB (c)    Follows Commands/Answers Questions (P)  100% of the time;able to follow multi-step instructions  - 100% of the time;able to follow multi-step instructions  -PB (r) JM (t) PB (c)    Personal Safety (P)  WNL/WFL  -MK WNL/WFL  -PB (r) JM (t) PB (c)    Personal Safety Interventions (P)  fall prevention program maintained;gait belt;nonskid shoes/slippers when out of bed;supervised activity  - fall prevention program maintained;gait belt;nonskid shoes/slippers when out of bed;supervised activity  -PB (r) JM (t) PB (c)    ROM (Range of Motion)    General ROM Detail (P)  AROM WFL BUE  -MK impaired BLE AROM R>L   -PB (r) JM (t) PB (c)    MMT (Manual Muscle Testing)    General MMT Assessment Detail (P)   5/5, obs functional 4+/5 BUE  -MK 3/5 BLE, B hip flexion 2+/5  -PB (r) JM (t) PB (c)    Bed Mobility, Assessment/Treatment    Bed Mobility, Assistive Device (P)  bed rails  - bed rails  -PB (r) JM (t) PB (c)    Bed Mobility, Roll Left, [place]  supervision required  -PB (r) JM (t) PB (c)    Bed Mobility, Roll Right, [place] (P)  contact guard assist  -     Bed Mobility, Scoot/Bridge, [place] (P)  contact guard assist  - supervision required;2 person assist required  -PB (r) JM (t) PB (c)    Bed Mob, Supine to Sit, [place] (P)  minimum assist (75% patient effort);verbal cues required  -     Bed Mob, Sit to Supine, [place] (P)  minimum assist (75% patient effort);verbal cues required  - minimum assist (75% patient effort)  -PB (r) JM (t) PB (c)    Bed Mob, Sidelying to Sit, [place]  supervision required  -PB (r) JM (t) PB (c)    Bed Mobility, Safety Issues (P)  decreased use of legs for bridging/pushing  - decreased use of arms for pushing/pulling;decreased use of legs for bridging/pushing  -PB (r) JM (t) PB (c)    Bed Mobility, Impairments (P)  ROM decreased;strength decreased;impaired balance  -MK strength decreased;impaired  balance;pain  -PB (r) JM (t) PB (c)    Transfer Assessment/Treatment    Transfers, Sit-Stand [place] (P)  minimum assist (75% patient effort);contact guard assist;2 person assist required  -MK contact guard assist;2 person assist required  -PB (r) JM (t) PB (c)    Transfers, Stand-Sit [place] (P)  contact guard assist;2 person assist required  -MK contact guard assist;2 person assist required  -PB (r) JM (t) PB (c)    Transfers, Sit-Stand-Sit, Assist Device (P)  bed rails  -MK rolling walker   rollator  -PB (r) JM (t) PB (c)    Toilet Transfer, [place] (P)  set up required  -MK contact guard assist;2 person assist required  -PB (r) JM (t) PB (c)    Toilet Transfer, Assistive Device (P)  bedside commode without drop arms   rollator  -MK rolling walker   rollator  -PB (r) JM (t) PB (c)    Transfer, Safety Issues  balance decreased during turns;step length decreased;weight-shifting ability decreased  -PB (r) JM (t) PB (c)    Transfer, Impairments (P)  strength decreased;impaired balance;ROM decreased  - strength decreased;impaired balance;pain  -PB (r) JM (t) PB (c)    Transfer, Comment (P)  forward lean and poor posture with standing  -     Gait Assessment/Treatment    Gait, [place] Level  contact guard assist;2 person assist required  -PB (r) JM (t) PB (c)    Gait, Assistive Device  rollator  -PB (r) JM (t) PB (c)    Gait, Distance (Feet)  25  -PB (r) JM (t) PB (c)    Gait, Gait Deviations  bilateral:;gama decreased;double stance time increased;forward flexed posture;step length decreased;stride length decreased  -PB (r) JM (t) PB (c)    Gait, Safety Issues  balance decreased during turns;step length decreased;weight-shifting ability decreased  -PB (r) JM (t) PB (c)    Gait, Impairments  ROM decreased;strength decreased;impaired balance;pain  -PB (r) JM (t) PB (c)    Motor Skills/Interventions    Additional Documentation (P)  Balance Skills Training (Group)  - Balance Skills Training  (Group)  -PB (r) JM (t) PB (c)    Balance Skills Training    Sitting-Level of Assistance (P)  Close supervision  - Close supervision  -PB (r) JM (t) PB (c)    Sitting-Balance Support (P)  Right upper extremity supported;Feet supported  - Right upper extremity supported;Left upper extremity supported;Feet supported  -PB (r) JM (t) PB (c)    Standing-Level of Assistance (P)  Contact guard;x2  -MK Contact guard  -PB (r) JM (t) PB (c)    Static Standing Balance Support (P)  assistive device  - assistive device  -PB (r) JM (t) PB (c)    Gait Balance-Level of Assistance (P)  Contact guard;x2  - Contact guard;x2  -PB (r) JM (t) PB (c)    Gait Balance Support (P)  assistive device  - assistive device  -PB (r) JM (t) PB (c)    Positioning and Restraints    Pre-Treatment Position (P)  in bed  - in bed  -PB (r) JM (t) PB (c)    Post Treatment Position (P)  bed  - bed  -PB (r) JM (t) PB (c)    In Bed (P)  fowlers;call light within reach;encouraged to call for assist;with family/caregiver;side rails up x2;legs elevated  - fowlers;call light within reach;encouraged to call for assist;with family/caregiver;side rails up x2;legs elevated  -PB (r) JM (t) PB (c)      02/06/18 1011 02/05/18 1338    General Information    Equipment Currently Used at Home wheelchair;walker, rolling  -LJ raised toilet;walker, rolling;wheelchair  -JE    Living Environment    Lives With alone  -LJ     Living Arrangements house  -LJ     Home Accessibility no concerns  -LJ     Stair Railings at Home none  -LJ     Type of Financial/Environmental Concern none  -LJ     Transportation Available family or friend will provide  -       02/05/18 1200       General Information    Equipment Currently Used at Home none  -CF     Living Environment    Lives With alone  -CF     Living Arrangements house  -CF     Home Accessibility no concerns  -CF     Stair Railings at Home none  -CF     Type of Financial/Environmental Concern none  -CF      Transportation Available car;family or friend will provide  -CF       User Key  (r) = Recorded By, (t) = Taken By, (c) = Cosigned By    Initials Name Provider Type    SHAHRIAR Sherman, RN Registered Nurse    CF Yaneli Curtis, RN Registered Nurse    PB Rikki Ponce, PT DPT Physical Therapist    MIKE Shields, CSW      Law Gan, PT Student PT Student    SHASHI Wilder, OT Student OT Student          Physical Therapy Education     Title: PT OT SLP Therapies (Done)     Topic: Physical Therapy (Done)     Point: Mobility training (Done)    Learning Progress Summary    Learner Readiness Method Response Comment Documented by Status   Patient Acceptance E VU Educated on transfer and bed mobility techniques, benefits of activity, role of PT, POC  02/06/18 1158 Done                      User Key     Initials Effective Dates Name Provider Type Discipline     01/10/18 -  Law Gan, PT Student PT Student PT                PT Recommendation and Plan  Anticipated Discharge Disposition: extended care facility  Planned Therapy Interventions: balance training, bed mobility training, gait training, patient/family education, strengthening, transfer training  PT Frequency: daily, 2 times/day, per priority policy  Plan of Care Review  Plan Of Care Reviewed With: patient  Outcome Summary/Follow up Plan: PT eval complete. Pt was able to perform bed mobility ranging from CGA to minAx2, transfer sit-stand-sit and bed to BSC with CGAx2 and rollator, and ambulate to the door and back with CGAx2. Pt demonstrates some increased pain and anxiety with activity but should progress as planned.  She will benefit from therapy to address ebd mobility, transfer training, gait training and activity tolerance. Anticipate discharge to [facility].          IP PT Goals       02/06/18 1302 02/06/18 1202       Bed Mobility PT LTG    Bed Mobility PT LTG, Date Established 02/06/18  -PB (r) JM (t) PB (c)      Bed Mobility  PT LTG, Time to Achieve  by discharge  -PB (r) JM (t) PB (c)     Bed Mobility PT LTG, Activity Type  all bed mobility  -PB (r) JM (t) PB (c)     Bed Mobility PT LTG, [place] Level  conditional independence  -PB (r) JM (t) PB (c)     Bed Mobility PT Goal  LTG, Assist Device  bed rails  -PB (r) JM (t) PB (c)     Transfer Training PT LTG    Transfer Training PT LTG, Date Established  02/06/18  -PB (r) JM (t) PB (c)     Transfer Training PT LTG, Time to Achieve  by discharge  -PB (r) JM (t) PB (c)     Transfer Training PT LTG, Activity Type  bed to chair /chair to bed;sit to stand/stand to sit  -PB (r) JM (t) PB (c)     Transfer Training PT LTG, [place] Level  supervision required  -PB (r) JM (t) PB (c)     Transfer Training PT LTG, Assist Device  walker, rolling platform  -PB (r) JM (t) PB (c)     Gait Training PT LTG    Gait Training Goal PT LTG, Date Established  02/06/18  -PB (r) JM (t) PB (c)     Gait Training Goal PT LTG, Time to Achieve  by discharge  -PB (r) JM (t) PB (c)     Gait Training Goal PT LTG, [place] Level  supervision required  -PB (r) JM (t) PB (c)     Gait Training Goal PT LTG, Assist Device  walker, rolling platform  -PB (r) JM (t) PB (c)     Gait Training Goal PT LTG, Distance to Achieve  50 feet 1 standing rest break  -PB (r) JM (t) PB (c)       User Key  (r) = Recorded By, (t) = Taken By, (c) = Cosigned By    Initials Name Provider Type    PB Rikki Ponce, PT DPT Physical Therapist    ELENI Gan, PT Student PT Student                Outcome Measures       02/06/18 1200 02/06/18 1100       How much help from another person do you currently need...    Turning from your back to your side while in flat bed without using bedrails?  4  -PB (r) JM (t) PB (c)     Moving from lying on back to sitting on the side of a flat bed without bedrails?  4  -PB (r) JM (t) PB (c)     Moving to and from a bed to a chair (including a wheelchair)?  3  -PB (r) JM (t) PB (c)     Standing up  from a chair using your arms (e.g., wheelchair, bedside chair)?  3  -PB (r) JM (t) PB (c)     Climbing 3-5 steps with a railing?  2  -PB (r) JM (t) PB (c)     To walk in hospital room?  3  -PB (r) JM (t) PB (c)     AM-PAC 6 Clicks Score  19  -PB (r) JM (t)     How much help from another is currently needed...    Putting on and taking off regular lower body clothing? (P)  2  -MK      Bathing (including washing, rinsing, and drying) (P)  2  -MK      Toileting (which includes using toilet bed pan or urinal) (P)  3  -MK      Putting on and taking off regular upper body clothing (P)  3  -MK      Taking care of personal grooming (such as brushing teeth) (P)  3  -MK      Eating meals (P)  4  -MK      Score (P)  17  -PB (r) MK (t)      Functional Assessment    Outcome Measure Options (P)  AM-PAC 6 Clicks Daily Activity (OT)  -MK AM-PAC 6 Clicks Basic Mobility (PT)  -PB (r) JM (t) PB (c)       User Key  (r) = Recorded By, (t) = Taken By, (c) = Cosigned By    Initials Name Provider Type    JESSICA Ponce, PT DPT Physical Therapist    ELENI Gan, PT Student PT Student    SHASHI Wilder, OT Student OT Student           Time Calculation:         PT Charges       02/06/18 1200          Time Calculation    Start Time 1049  -PB (r) JM (t) PB (c)      Stop Time 1137  -PB (r) JM (t) PB (c)      Time Calculation (min) 48 min  -PB (r) JM (t)      PT Received On 02/06/18  -PB (r) JM (t) PB (c)      PT Goal Re-Cert Due Date 02/16/18  -PB (r) JM (t) PB (c)        User Key  (r) = Recorded By, (t) = Taken By, (c) = Cosigned By    Initials Name Provider Type    JESSICA Ponce, PT DPT Physical Therapist    ELENI Gan, PT Student PT Student          Therapy Charges for Today     Code Description Service Date Service Provider Modifiers Qty    [id number] HC PT EVAL MOD COMPLEXITY 3 2/6/2018 Law Gan, PT Student GP 1          PT G-Codes  Outcome Measure Options: (P) AM-PAC 6 Clicks Daily Activity  (OT)  Score: 19  Functional Limitation: Mobility: Walking and moving around  Mobility: Walking and Moving Around Current Status (): At least 20 percent but less than 40 percent impaired, limited or restricted  Mobility: Walking and Moving Around Goal Status (): At least 1 percent but less than 20 percent impaired, limited or restricted      Law Gan, PT Student  2/6/2018

## 2018-02-06 NOTE — THERAPY EVALUATION
Acute Care - Occupational Therapy Initial Evaluation  [facility]     Patient Name: Carlotta Ortiz  : 1940  MRN: [id number]  Today's Date: 2018  Onset of Illness/Injury or Date of Surgery Date: (P) 18  Date of Referral to OT: (P) 18  Referring Physician: (P) Dr. Brady    Admit Date: 2018       ICD-10-CM ICD-9-CM   1. Impaired functional mobility, balance, gait, and endurance Z74.09 V49.89   2. Impaired mobility and ADLs Z74.09 799.89     Patient Active Problem List   Diagnosis   • Asthma attack     Past Medical History:   Diagnosis Date   • Asthma    • Cellulitis    • Diabetes mellitus    • Disease of thyroid gland    • Hypercholesteremia    • Hypertension      Past Surgical History:   Procedure Laterality Date   • ADENOIDECTOMY     • ANKLE SURGERY Left    • CHOLECYSTECTOMY     • TONSILLECTOMY     • TUBAL ABDOMINAL LIGATION            OT ASSESSMENT FLOWSHEET (last 72 hours)      OT Evaluation       18 1059 18 1049 18 1011 18 1338 18 1200    Rehab Evaluation    Document Type (P)  evaluation  -MK evaluation   see MAR  -PB (r) JM (t) PB (c)       Subjective Information (P)  agree to therapy;pain  -MK agree to therapy;complains of;pain  -PB (r) JM (t) PB (c)       Patient Effort, Rehab Treatment (P)  good  -MK good  -PB (r) JM (t) PB (c)       Symptoms Noted During/After Treatment  none  -PB (r) JM (t) PB (c)       General Information    Patient Profile Review (P)  yes  -MK yes  -PB (r) JM (t) PB (c)       Onset of Illness/Injury or Date of Surgery Date (P)  18  -MK 18  -PB (r) JM (t) PB (c)       Referring Physician (P)  Dr. Brady  -MK Dr. Brady  -PB (r) JM (t) PB (c)       General Observations (P)  supine in fowlers with legs elevated  -MK Pt supine in bed  -PB (r) JM (t) PB (c)       Pertinent History Of Current Problem (P)  asthma exacerbation, R hip pain, L knee bone on bone, low bone density due to hx of asthma drug usage  -MK Unsure as there  is no H&P. Notes say BLE pain and cellulitis, asthma exacerbation and SOB.  -PB (r) JM (t) PB (c)       Precautions/Limitations (P)  fall precautions  - fall precautions  -PB (r) JM (t) PB (c)       Prior Level of Function (P)  independent:;all household mobility;community mobility;mod assist:;ADL's   rollator for short distances   - independent:;all household mobility;community mobility;mod assist:;ADL's  -PB (r) JM (t) PB (c)       Equipment Currently Used at Home (P)  bath bench;wheelchair;rollator;hospital bed  - wheelchair;shower chair;rollator   hospital bed  -PB (r) JM (t) PB (c) wheelchair;walker, rolling  - raised toilet;walker, rolling;wheelchair  - none  -CF    Plans/Goals Discussed With (P)  patient;agreed upon  - patient;agreed upon  -PB (r) JM (t) PB (c)       Risks Reviewed (P)  patient:;LOB;nausea/vomiting;dizziness;increased discomfort;lines disloged  - patient:;LOB;nausea/vomiting;dizziness;increased discomfort  -PB (r) JM (t) PB (c)       Benefits Reviewed (P)  patient:;improve function;increase independence;increase strength;increase balance;decrease pain;increase knowledge  - patient:;improve function;increase independence;increase strength;increase balance;increase knowledge  -PB (r) JM (t) PB (c)       Barriers to Rehab (P)  previous functional deficit;medically complex  - medically complex;physical barrier  -PB (r) JM (t) PB (c)       Living Environment    Lives With (P)  alone  - alone  -PB (r) JM (t) PB (c) alone  -  alone  -    Living Arrangements (P)  apartment  - apartment  -PB (r) JM (t) PB (c) house  -  house  -    Home Accessibility (P)  bed and bath on same level;ramps present at home;tub/shower is not walk in  - ramps present at home;tub/shower is not walk in   step to get into bed  -PB (r) JM (t) PB (c) no concerns  -  no concerns  -    Stair Railings at Home  none  -PB (r) JM (t) PB (c) none  -LJ  none  -CF    Type of Financial/Environmental  Concern   none  -LJ  none  -CF    Transportation Available  car;family or friend will provide  -PB (r) JM (t) PB (c) family or friend will provide  -LJ  car;family or friend will provide  -CF    Living Environment Comment (P)  daughter and granddaughter available to assist with ADL 5-7 days/week, providing sponge bath  -        Clinical Impression    Date of Referral to OT (P)  02/05/18  -        OT Diagnosis (P)  decreased ADL  -        Prognosis (P)  good  -        Impairments Found (describe specific impairments) (P)  aerobic capacity/endurance;gait, locomotion, and balance;joint integrity and mobility;ROM  -MK        Patient/Family Goals Statement (P)  increase independence and functional mobility, decrease pain  -        Criteria for Skilled Therapeutic Interventions Met (P)  yes;treatment indicated  -        Rehab Potential (P)  good, to achieve stated therapy goals  -        Therapy Frequency (P)  3-5 times/wk  -        Predicted Duration of Therapy Intervention (days/wks) (P)  until d/c  -        Anticipated Discharge Disposition (P)  skilled nursing facility  -        Functional Level Prior    Ambulation     1-->assistive equipment  -CF    Transferring     1-->assistive equipment  -CF    Toileting     1-->assistive equipment  -CF    Bathing     1-->assistive equipment  -CF    Dressing     1-->assistive equipment  -CF    Eating     1-->assistive equipment  -CF    Communication     0-->understands/communicates without difficulty  -CF    Swallowing     0-->swallows foods/liquids without difficulty  -CF    Prior Functional Level Comment     na  -CF    Pain Assessment    Pain Assessment (P)  Portillo-Feliz FACES  - Portillo-Feliz FACES  -PB (r) JM (t) PB (c)       Portillo-Baker FACES Pain Rating (P)  4  -MK 4  -PB (r) JM (t) PB (c)       Pain Type (P)  Chronic pain  - Chronic pain  -PB (r) JM (t) PB (c)       Pain Location (P)  Hip  -MK Hip  -PB (r) JM (t) PB (c)       Pain Orientation (P)  Right   -MK Right  -PB (r) JM (t) PB (c)       Pain Descriptors (P)  Sharp  -MK Sharp  -PB (r) JM (t) PB (c)       Pain Frequency (P)  Constant/continuous  -MK Constant/continuous  -PB (r) JM (t) PB (c)       Pain Intervention(s) (P)  Repositioned;Ambulation/increased activity  -MK Medication (See MAR);Ambulation/increased activity  -PB (r) JM (t) PB (c)       Response to Interventions (P)  tolerated  -MK tolerated  -PB (r) JM (t) PB (c)       Multiple Pain Sites (P)  Yes  -MK Yes  -PB (r) JM (t) PB (c)       Pain 2    Portillo-Baker FACES Pain Rating 2 (P)  4   L knee  -MK 4   L knee  -PB (r) JM (t) PB (c)       Vision Assessment/Intervention    Visual Impairment (P)  WFL  -MK        Cognitive Assessment/Intervention    Current Cognitive/Communication Assessment (P)  functional  -MK functional  -PB (r) JM (t) PB (c)       Orientation Status (P)  oriented x 4  -MK oriented x 4;person;place;time  -PB (r) JM (t) PB (c)       Follows Commands/Answers Questions (P)  100% of the time;able to follow multi-step instructions  -% of the time;able to follow multi-step instructions  -PB (r) JM (t) PB (c)       Personal Safety (P)  WNL/WFL  -MK WNL/WFL  -PB (r) JM (t) PB (c)       Personal Safety Interventions (P)  fall prevention program maintained;gait belt;nonskid shoes/slippers when out of bed;supervised activity  -MK fall prevention program maintained;gait belt;nonskid shoes/slippers when out of bed;supervised activity  -PB (r) JM (t) PB (c)       ROM (Range of Motion)    General ROM Detail (P)  AROM WFL BUE  -MK impaired BLE AROM R>L   -PB (r) JM (t) PB (c)       MMT (Manual Muscle Testing)    General MMT Assessment Detail (P)   5/5, obs functional 4+/5 BUE  -MK 3/5 BLE, B hip flexion 2+/5  -PB (r) JM (t) PB (c)       Bed Mobility, Assessment/Treatment    Bed Mobility, Assistive Device (P)  bed rails  -MK bed rails  -JESSICA (ismael) ELENI winslow) JESSICA (c)       Bed Mobility, Roll Left, [place]  supervision required  -JESSICA (r) ELENI (t) JESSICA  (c)       Bed Mobility, Roll Right, [place] (P)  contact guard assist  -MK        Bed Mobility, Scoot/Bridge, [place] (P)  contact guard assist  -MK supervision required;2 person assist required  -PB (r) JM (t) PB (c)       Bed Mob, Supine to Sit, [place] (P)  minimum assist (75% patient effort);verbal cues required  -MK        Bed Mob, Sit to Supine, [place] (P)  minimum assist (75% patient effort);verbal cues required  -MK minimum assist (75% patient effort)  -PB (r) JM (t) PB (c)       Bed Mob, Sidelying to Sit, [place]  supervision required  -PB (r) JM (t) PB (c)       Bed Mobility, Safety Issues (P)  decreased use of legs for bridging/pushing  -MK decreased use of arms for pushing/pulling;decreased use of legs for bridging/pushing  -PB (r) JM (t) PB (c)       Bed Mobility, Impairments (P)  ROM decreased;strength decreased;impaired balance  -MK strength decreased;impaired balance;pain  -PB (r) JM (t) PB (c)       Transfer Assessment/Treatment    Transfers, Sit-Stand [place] (P)  minimum assist (75% patient effort);contact guard assist;2 person assist required  -MK contact guard assist;2 person assist required  -PB (r) JM (t) PB (c)       Transfers, Stand-Sit [place] (P)  contact guard assist;2 person assist required  -MK contact guard assist;2 person assist required  -PB (r) JM (t) PB (c)       Transfers, Sit-Stand-Sit, Assist Device (P)  bed rails  -MK rolling walker   rollator  -PB (r) JM (t) PB (c)       Toilet Transfer, [place] (P)  set up required  -MK contact guard assist;2 person assist required  -PB (r) JM (t) PB (c)       Toilet Transfer, Assistive Device (P)  bedside commode without drop arms   rollator  -MK rolling walker   rollator  -PB (r) JM (t) PB (c)       Transfer, Safety Issues  balance decreased during turns;step length decreased;weight-shifting ability decreased  -PB (r) JM (t) PB (c)       Transfer, Impairments (P)  strength decreased;impaired  balance;ROM decreased  - strength decreased;impaired balance;pain  -PB (r) JM (t) PB (c)       Transfer, Comment (P)  forward lean and poor posture with standing  -        Functional Mobility    Functional Mobility- Ind. Level (P)  minimum assist (75% patient effort);contact guard assist;2 person assist required  -        Functional Mobility- Device (P)  rollator  -        Functional Mobility- Comment (P)  amb to door back to bed  -        Lower Body Dressing Assessment/Training    LB Dressing Assess/Train, Clothing Type (P)  donning:;doffing:;shoes  -        LB Dressing Assess/Train, Position (P)  sitting  -        LB Dressing Assess/Train, [place] (P)  maximum assist (25% patient effort)  -        LB Dressing Assess/Train, Impairments (P)  ROM decreased;strength decreased;impaired balance  -        Toileting Assessment/Training    Toileting Assess/Train, Assistive Device (P)  bedside commode  -        Toileting Assess/Train, Position (P)  sitting;standing  -        Toileting Assess/Train, Indepen Level (P)  contact guard assist;set up required  -        Toileting Assess/Train, Impairments (P)  strength decreased;impaired balance  -        Toileting Assess/Train, Comment (P)  completes hygiene and clothing managment with set up, CGA in standing for balance  -        Motor Skills/Interventions    Additional Documentation (P)  Balance Skills Training (Group)  - Balance Skills Training (Group)  -PB (r) JM (t) PB (c)       Balance Skills Training    Sitting-Level of Assistance (P)  Close supervision  - Close supervision  -PB (r) JM (t) PB (c)       Sitting-Balance Support (P)  Right upper extremity supported;Feet supported  - Right upper extremity supported;Left upper extremity supported;Feet supported  -PB (r) JM (t) PB (c)       Standing-Level of Assistance (P)  Contact guard;x2  - Contact guard  -PB (r) JM (t) PB (c)       Static Standing Balance Support (P)  assistive device   -MK assistive device  -PB (r) JM (t) PB (c)       Gait Balance-Level of Assistance (P)  Contact guard;x2  -MK Contact guard;x2  -PB (r) JM (t) PB (c)       Gait Balance Support (P)  assistive device  -MK assistive device  -PB (r) JM (t) PB (c)       General Therapy Interventions    Planned Therapy Interventions (P)  ADL retraining;bed mobility training;transfer training  -        Positioning and Restraints    Pre-Treatment Position (P)  in bed  -MK in bed  -PB (r) JM (t) PB (c)       Post Treatment Position (P)  bed  -MK bed  -PB (r) JM (t) PB (c)       In Bed (P)  fowlers;call light within reach;encouraged to call for assist;with family/caregiver;side rails up x2;legs elevated  -MK fowlers;call light within reach;encouraged to call for assist;with family/caregiver;side rails up x2;legs elevated  -PB (r) JM (t) PB (c)         User Key  (r) = Recorded By, (t) = Taken By, (c) = Cosigned By    Initials Name Effective Dates    SHAHRIAR Sherman, RN 08/02/16 -     CF Yaneli Curtis, ANTIONE 08/02/16 -     PB Rikki Ponce, PT DPT 08/02/16 -     MIKE Shields, W 09/15/16 -     ELENI Gan, PT Student 01/10/18 -     SHASHI Wilder, OT Student 11/20/17 -            Occupational Therapy Education     Title: PT OT SLP Therapies (Done)     Topic: Occupational Therapy (Done)     Point: ADL training (Done)    Description: Instruct learner(s) on proper safety adaptation and remediation techniques during self care or transfers.   Instruct in proper use of assistive devices.    Learning Progress Summary    Learner Readiness Method Response Comment Documented by Status   Patient Acceptance E VU benefit of activity, benefit of OT, activity modification, transfer safety/training, bed mobility training/safety  02/06/18 1203 Done   Family Acceptance E VU benefit of activity, benefit of OT, activity modification, transfer safety/training, bed mobility training/safety  02/06/18 1203 Done                      User Key      Initials Effective Dates Name Provider Type Discipline     11/20/17 -  Randee Wilder OT Student OT Student OT                  OT Recommendation and Plan  Anticipated Discharge Disposition: (P) skilled nursing facility  Planned Therapy Interventions: (P) ADL retraining, bed mobility training, transfer training  Therapy Frequency: (P) 3-5 times/wk  Plan of Care Review  Plan Of Care Reviewed With: (P) patient, daughter  Progress: (P) no change  Outcome Summary/Follow up Plan: (P) OT eval completed. Pt awake, alert, orientedx4. Pt reports chronic R hip and L knee pain which have impacted participation in ADL over the last couple of years, worsening in the last 6 months. She reports being given a sponge bath over past 6 months due to difficulty with amb and transfers. She walks approx. 10ft from bed, to chair, to commode in home with rollator. Completes LB dressing with maxA for shoes, Anyi for clothing managment with toileting. Toilets using BSC with set up assistance. Pt reports having great difficulty with bed mobility at home but able to complete with CGA and vc. Pt requires skilled OT for education regarding home safety and activity modifications to increase functional independence with ADL and safety to decrease risk of falls for d/c. Pt plans to d/c to [facility] in agreement.          OT Goals       02/06/18 1059          Transfer Training OT LTG    Transfer Training OT LTG, Date Established (P)  02/06/18  [facility]      Transfer Training OT LTG, Time to Achieve (P)  by discharge  -      Transfer Training OT LTG, Activity Type (P)  toilet;tub  -      Transfer Training OT LTG, [place] Level (P)  supervision required  -      Transfer Training OT LTG, Assist Device (P)  commode, bedside;tub bench;walker, rolling  -      Patient Education OT LTG    Patient Education OT LTG, Date Established (P)  02/06/18  -      Patient Education OT LTG, Time to Achieve (P)  by discharge  -      Patient Education OT  LTG, Education Type (P)  home safety;adaptive equipment mgmt;joint protection  -      Patient Education OT LTG, Education Understanding (P)  independent;demonstrates adequately;verbalizes understanding  -      Bathing OT LTG    Bathing Goal OT LTG, Date Established (P)  02/06/18  -      Bathing Goal OT LTG, Time to Achieve (P)  by discharge  -      Bathing Goal OT LTG, [place] Level (P)  moderate assist (50% patient effort)  -      Bathing Goal OT LTG, Assist Device (P)  sponge, long handled;tub rails;tub bench with back  -        User Key  (r) = Recorded By, (t) = Taken By, (c) = Cosigned By    Initials Name Provider Type    SHASHI Wilder, OT Student OT Student                Outcome Measures       02/06/18 1200 02/06/18 1100       How much help from another person do you currently need...    Turning from your back to your side while in flat bed without using bedrails?  4  -PB (r) JM (t) PB (c)     Moving from lying on back to sitting on the side of a flat bed without bedrails?  4  -PB (r) JM (t) PB (c)     Moving to and from a bed to a chair (including a wheelchair)?  3  -PB (r) JM (t) PB (c)     Standing up from a chair using your arms (e.g., wheelchair, bedside chair)?  3  -PB (r) JM (t) PB (c)     Climbing 3-5 steps with a railing?  2  -PB (r) JM (t) PB (c)     To walk in hospital room?  3  -PB (r) JM (t) PB (c)     AM-PAC 6 Clicks Score  19  -PB (r) JM (t)     How much help from another is currently needed...    Putting on and taking off regular lower body clothing? (P)  2  -MK      Bathing (including washing, rinsing, and drying) (P)  2  -MK      Toileting (which includes using toilet bed pan or urinal) (P)  3  -MK      Putting on and taking off regular upper body clothing (P)  3  -MK      Taking care of personal grooming (such as brushing teeth) (P)  3  -MK      Eating meals (P)  4  -MK      Score (P)  17  -PB (r) MK (t)      Functional Assessment    Outcome Measure Options (P)  AM-PAC 6  Clicks Daily Activity (OT)  -MK AM-PAC 6 Clicks Basic Mobility (PT)  -PB (r) ELENI (t) PB (c)       User Key  (r) = Recorded By, (t) = Taken By, (c) = Cosigned By    Initials Name Provider Type    PB Rikki Ponce, PT DPT Physical Therapist    ELENI Gan, PT Student PT Student    SHASHI Wilder, OT Student OT Student          Time Calculation:   OT Start Time: (P) 1050  OT Stop Time: (P) 1140  OT Time Calculation (min): (P) 50 min    Therapy Charges for Today     Code Description Service Date Service Provider Modifiers Qty    88623294226 HC OT EVAL MOD COMPLEXITY 3 2/6/2018 Randee Wilder, OT Student GO, KX 1          OT G-codes  OT Professional Judgement Used?: (P) Yes  OT Functional Scales Options: (P) AM-PAC 6 Clicks Daily Activity (OT)  Score: (P) 17  Functional Limitation: (P) Self care  Self Care Current Status (): (P) At least 40 percent but less than 60 percent impaired, limited or restricted  Self Care Goal Status (): (P) At least 20 percent but less than 40 percent impaired, limited or restricted    Randee Wilder OT Student  2/6/2018

## 2018-02-06 NOTE — PROGRESS NOTES
Discharge Planning Assessment  [facility]     Patient Name: Carlotta Ortiz  MRN: [id number]  Today's Date: 2/6/2018    Admit Date: 2/5/2018          Discharge Needs Assessment       02/06/18 1011    Living Environment    Lives With alone    Living Arrangements house    Home Accessibility no concerns    Stair Railings at Home none    Type of Financial/Environmental Concern none    Transportation Available family or friend will provide    Living Environment    Provides Primary Care For no one    Quality Of Family Relationships helpful    Able to Return to Prior Living Arrangements other (see comments)    Living Arrangement Comments PT REQUESTING TO GO TO [facility]    Discharge Needs Assessment    Concerns To Be Addressed denies needs/concerns at this time    Readmission Within The Last 30 Days no previous admission in last 30 days    Outpatient/Agency/Support Group Needs skilled nursing facility (specify)    Community Agency Name(S) [facility]    Anticipated Changes Related to Illness none    Equipment Currently Used at Home wheelchair;walker, rolling    Equipment Needed After Discharge none    Discharge Facility/Level Of Care Needs nursing facility, skilled    Discharge Disposition still a patient    Discharge Planning Comments PT LIVES ALONE BUT IS REQUESTING TO GO TO [facility]. MADE REFERRAL TO [facility] [phone]. WILL NEED HISTORY AND PHYSICAL IN ORDER FOR [facility] TO EVAL.             Discharge Plan       02/06/18 1015    Case Management/Social Work Plan    Plan [facility]        Discharge Placement     No information found                Demographic Summary     None            Functional Status     None            Psychosocial     None            Abuse/Neglect     None            Legal     None            Substance Abuse     None            Patient Forms     None          NEFTALI Wells

## 2018-02-06 NOTE — PLAN OF CARE
Problem: Patient Care Overview (Adult)  Goal: Plan of Care Review  Outcome: Ongoing (interventions implemented as appropriate)   02/05/18 1338 02/06/18 1202   Coping/Psychosocial Response Interventions   Plan Of Care Reviewed With --  patient   Patient Care Overview   Progress no change --    Outcome Evaluation   Outcome Summary/Follow up Plan --  PT eval complete. Pt was able to perform bed mobility ranging from CGA to minAx2, transfer sit-stand-sit and bed to BSC with CGAx2 and rollator, and ambulate to the door and back with CGAx2. Pt demonstrates some increased pain and anxiety with activity but should progress as planned. She will benefit from therapy to address ebd mobility, transfer training, gait training and activity tolerance. Anticipate discharge to [facility].       Problem: Inpatient Physical Therapy  Goal: Bed Mobility Goal LTG- PT  Outcome: Ongoing (interventions implemented as appropriate)   02/06/18 1202 02/06/18 1302   Bed Mobility PT LTG   Bed Mobility PT LTG, Date Established --  02/06/18   Bed Mobility PT LTG, Time to Achieve by discharge --    Bed Mobility PT LTG, Activity Type all bed mobility --    Bed Mobility PT LTG, [place] Level conditional independence --    Bed Mobility PT Goal LTG, Assist Device bed rails --      Goal: Transfer Training Goal 1 LTG- PT  Outcome: Ongoing (interventions implemented as appropriate)   02/06/18 1202   Transfer Training PT LTG   Transfer Training PT LTG, Date Established 02/06/18   Transfer Training PT LTG, Time to Achieve by discharge   Transfer Training PT LTG, Activity Type bed to chair /chair to bed;sit to stand/stand to sit   Transfer Training PT LTG, [place] Level supervision required   Transfer Training PT LTG, Assist Device walker, rolling platform     Goal: Gait Training Goal LTG- PT  Outcome: Ongoing (interventions implemented as appropriate)   02/06/18 1202   Gait Training PT LTG   Gait Training Goal PT LTG, Date Established 02/06/18   Gait  Training Goal PT LTG, Time to Achieve by discharge   Gait Training Goal PT LTG, [place] Level supervision required   Gait Training Goal PT LTG, Assist Device walker, rolling platform   Gait Training Goal PT LTG, Distance to Achieve 50 feet 1 standing rest break

## 2018-02-06 NOTE — PLAN OF CARE
Problem: Patient Care Overview (Adult)  Goal: Plan of Care Review  Outcome: Ongoing (interventions implemented as appropriate)      Problem: Inpatient Physical Therapy  Goal: Bed Mobility Goal LTG- PT  Outcome: Ongoing (interventions implemented as appropriate)    Goal: Transfer Training Goal 1 LTG- PT  Outcome: Ongoing (interventions implemented as appropriate)    Goal: Gait Training Goal LTG- PT  Outcome: Ongoing (interventions implemented as appropriate)

## 2018-02-06 NOTE — THERAPY TREATMENT NOTE
Acute Care - Physical Therapy Treatment Note  [facility]     Patient Name: Carlotta Otriz  : 1940  MRN: [id number]  Today's Date: 2018  Onset of Illness/Injury or Date of Surgery Date: (P) 18  Date of Referral to PT: 18  Referring Physician: LukeP) Dr. Brady    Admit Date: 2018    Visit Dx:    ICD-10-CM ICD-9-CM   1. Impaired functional mobility, balance, gait, and endurance Z74.09 V49.89   2. Impaired mobility and ADLs Z74.09 799.89     Patient Active Problem List   Diagnosis   • Asthma attack               Adult Rehabilitation Note       18 1516          Rehab Assessment/Intervention    Discipline physical therapy assistant  -CW      Document Type therapy note (daily note)  -CW      Subjective Information agree to therapy;complains of;pain;weakness  -CW      Patient Effort, Rehab Treatment good  -CW      Precautions/Limitations fall precautions  -CW      Recorded by [CW] Lillie Oneill PTA      Pain Assessment    Pain Assessment No/denies pain  -CW      Recorded by [CW] Lillie Oneill PTA      Cognitive Assessment/Intervention    Personal Safety Interventions gait belt;nonskid shoes/slippers when out of bed  -CW      Recorded by [CW] Lillie Oneill PTA      Bed Mobility, Assessment/Treatment    Bed Mobility, Assistive Device bed rails  -CW      Bed Mob, Supine to Sit, [place] contact guard assist  -CW      Bed Mob, Sit to Supine, [place] minimum assist (75% patient effort)   assist with BLE  -CW      Bed Mobility, Safety Issues decreased use of legs for bridging/pushing  -CW      Bed Mobility, Impairments strength decreased  -CW      Recorded by [CW] Lillie Oneill PTA      Transfer Assessment/Treatment    Transfers, Sit-Stand [place] contact guard assist  -CW      Transfers, Stand-Sit [place] stand by assist  -CW      Transfers, Sit-Stand-Sit, Assist Device rolling walker  -CW      Transfer, Impairments strength decreased  -CW      Recorded  by [CW] Lillie Oneill PTA      Gait Assessment/Treatment    Gait, [place] Level contact guard assist  -CW      Gait, Assistive Device rollator  -CW      Gait, Distance (Feet) 50  -CW      Gait, Gait Deviations gama decreased;step length decreased  -CW      Gait, Safety Issues weight-shifting ability decreased  -CW      Gait, Impairments strength decreased  -CW      Recorded by [CW] Lillie Oneill PTA      Therapy Exercises    Bilateral Lower Extremities AAROM:;10 reps;supine  -CW      Recorded by [CW] Lillie Oneill PTA      Positioning and Restraints    Pre-Treatment Position in bed  -CW      Post Treatment Position bed  -CW      In Bed fowlers;call light within reach;encouraged to call for assist;legs elevated  -CW      Recorded by [CW] Lillie Oneill PTA        User Key  (r) = Recorded By, (t) = Taken By, (c) = Cosigned By    Initials Name Effective Dates     Lillie Oneill PTA 06/22/15 -                 IP PT Goals       02/06/18 1302 02/06/18 1202       Bed Mobility PT LTG    Bed Mobility PT LTG, Date Established 02/06/18  -PB (r) JM (t) PB (c)      Bed Mobility PT LTG, Time to Achieve  by discharge  -PB (r) JM (t) PB (c)     Bed Mobility PT LTG, Activity Type  all bed mobility  -PB (r) JM (t) PB (c)     Bed Mobility PT LTG, [place] Level  conditional independence  -PB (r) JM (t) PB (c)     Bed Mobility PT Goal  LTG, Assist Device  bed rails  -PB (r) JM (t) PB (c)     Transfer Training PT LTG    Transfer Training PT LTG, Date Established  02/06/18  -PB (r) JM (t) PB (c)     Transfer Training PT LTG, Time to Achieve  by discharge  -PB (r) JM (t) PB (c)     Transfer Training PT LTG, Activity Type  bed to chair /chair to bed;sit to stand/stand to sit  -PB (r) JM (t) PB (c)     Transfer Training PT LTG, [place] Level  supervision required  -PB (r) JM (t) PB (c)     Transfer Training PT LTG, Assist Device  walker, rolling platform  -PB (r) JM (t) PB (c)     Gait Training  PT LTG    Gait Training Goal PT LTG, Date Established  02/06/18  -PB (r) ELENI (t) PB (c)     Gait Training Goal PT LTG, Time to Achieve  by discharge  -PB (r) ELENI (t) PB (c)     Gait Training Goal PT LTG, [place] Level  supervision required  -PB (r) ELENI (t) PB (c)     Gait Training Goal PT LTG, Assist Device  walker, rolling platform  -PB (r) ELENI (t) PB (c)     Gait Training Goal PT LTG, Distance to Achieve  50 feet 1 standing rest break  -PB (r) ELENI (t) PB (c)       User Key  (r) = Recorded By, (t) = Taken By, (c) = Cosigned By    Initials Name Provider Type    PB Rikki Ponce, PT DPT Physical Therapist     Law Gan, PT Student PT Student          Physical Therapy Education     Title: PT OT SLP Therapies (Done)     Topic: Physical Therapy (Done)     Point: Mobility training (Done)    Learning Progress Summary    Learner Readiness Method Response Comment Documented by Status   Patient Acceptance E VU Educated on transfer and bed mobility techniques, benefits of activity, role of PT, POC  02/06/18 1158 Done                      User Key     Initials Effective Dates Name Provider Type Discipline     01/10/18 -  Law Gan, PT Student PT Student PT                    PT Recommendation and Plan  Anticipated Discharge Disposition: extended care facility  Planned Therapy Interventions: balance training, bed mobility training, gait training, patient/family education, strengthening, transfer training  PT Frequency: daily, 2 times/day, per priority policy             Outcome Measures       02/06/18 1200 02/06/18 1100       How much help from another person do you currently need...    Turning from your back to your side while in flat bed without using bedrails?  4  -PB (r) JM (t) PB (c)     Moving from lying on back to sitting on the side of a flat bed without bedrails?  4  -PB (r) JM (t) PB (c)     Moving to and from a bed to a chair (including a wheelchair)?  3  -PB (r) JM (t) PB (c)     Standing up  from a chair using your arms (e.g., wheelchair, bedside chair)?  3  -PB (r) JM (t) PB (c)     Climbing 3-5 steps with a railing?  2  -PB (r) JM (t) PB (c)     To walk in hospital room?  3  -PB (r) JM (t) PB (c)     AM-PAC 6 Clicks Score  19  -PB (r) JM (t)     How much help from another is currently needed...    Putting on and taking off regular lower body clothing? (P)  2  -MK      Bathing (including washing, rinsing, and drying) (P)  2  -MK      Toileting (which includes using toilet bed pan or urinal) (P)  3  -MK      Putting on and taking off regular upper body clothing (P)  3  -MK      Taking care of personal grooming (such as brushing teeth) (P)  3  -MK      Eating meals (P)  4  -MK      Score (P)  17  -PB (r) MK (t)      Functional Assessment    Outcome Measure Options (P)  AM-PAC 6 Clicks Daily Activity (OT)  -MK AM-PAC 6 Clicks Basic Mobility (PT)  -PB (r) JM (t) PB (c)       User Key  (r) = Recorded By, (t) = Taken By, (c) = Cosigned By    Initials Name Provider Type    PB Rikki Ponce, PT DPT Physical Therapist    ELENI Gan, PT Student PT Student    SHASHI Wilder, OT Student OT Student           Time Calculation:         PT Charges       02/06/18 1550 02/06/18 1200       Time Calculation    Start Time 1516  -CW 1049  -PB (r) JM (t) PB (c)     Stop Time 1542  -CW 1137  -PB (r) JM (t) PB (c)     Time Calculation (min) 26 min  -CW 48 min  -PB (r) JM (t)     PT Received On 02/06/18  -CW 02/06/18  -PB (r) JM (t) PB (c)     PT Goal Re-Cert Due Date 02/16/18  -CW 02/16/18  -PB (r) JM (t) PB (c)     Time Calculation- PT    Total Timed Code Minutes- PT 26 minute(s)  -CW        User Key  (r) = Recorded By, (t) = Taken By, (c) = Cosigned By    Initials Name Provider Type    CW Lillie L Oneill, PTA Physical Therapy Assistant    PB Rikki Ponce, PT DPT Physical Therapist    ELENI Gan, PT Student PT Student          Therapy Charges for Today     Code Description Service Date Service  Provider Modifiers Qty    [id number]  PT THER PROC EA 15 MIN 2/6/2018 Lillie Oneill PTA GP, KX 1    [id number] HC GAIT TRAINING EA 15 MIN 2/6/2018 Lillie Oneill PTA GP, KX 1          PT G-Codes  Outcome Measure Options: (P) AM-PAC 6 Clicks Daily Activity (OT)  Score: 19  Functional Limitation: Mobility: Walking and moving around  Mobility: Walking and Moving Around Current Status (): At least 20 percent but less than 40 percent impaired, limited or restricted  Mobility: Walking and Moving Around Goal Status (): At least 1 percent but less than 20 percent impaired, limited or restricted    Lillie Oneill PTA  2/6/2018

## 2018-02-06 NOTE — PLAN OF CARE
Problem: Patient Care Overview (Adult)  Goal: Plan of Care Review  Outcome: Ongoing (interventions implemented as appropriate)   02/06/18 1059   Coping/Psychosocial Response Interventions   Plan Of Care Reviewed With patient;daughter   Patient Care Overview   Progress no change   Outcome Evaluation   Outcome Summary/Follow up Plan OT tono completed. Pt awake, alert, orientedx4. Pt reports chronic R hip and L knee pain which have impacted participation in ADL over the last couple of years, worsening in the last 6 months. She reports being given a sponge bath over past 6 months due to difficulty with amb and transfers. She walks approx. 10ft from bed, to chair, to commode in home with rollator. Completes LB dressing with maxA for shoes, Anyi for clothing managment with toileting. Toilets using BSC with set up assistance. Pt reports having great difficulty with bed mobility at home but able to complete with CGA and vc. Pt requires skilled OT for education regarding home safety and activity modifications to increase functional independence with ADL and safety to decrease risk of falls for d/c. Pt plans to d/c to [facility] in agreement.       Problem: Inpatient Occupational Therapy  Goal: Transfer Training Goal 1 LTG- OT  Outcome: Ongoing (interventions implemented as appropriate)   02/06/18 1059   Transfer Training OT LTG   Transfer Training OT LTG, Date Established 02/06/18   Transfer Training OT LTG, Time to Achieve by discharge   Transfer Training OT LTG, Activity Type toilet;tub   Transfer Training OT LTG, [place] Level supervision required   Transfer Training OT LTG, Assist Device commode, bedside;tub bench;walker, rolling     Goal: Patient Education Goal LTG- OT  Outcome: Ongoing (interventions implemented as appropriate)   02/06/18 1059   Patient Education OT LTG   Patient Education OT LTG, Date Established 02/06/18   Patient Education OT LTG, Time to Achieve by discharge   Patient Education OT LTG,  Education Type home safety;adaptive equipment mgmt;joint protection   Patient Education OT LTG, Education Understanding independent;demonstrates adequately;verbalizes understanding     Goal: Bathing Goal LTG- OT  Outcome: Ongoing (interventions implemented as appropriate)   02/06/18 1059   Bathing OT LTG   Bathing Goal OT LTG, Date Established 02/06/18   Bathing Goal OT LTG, Time to Achieve by discharge   Bathing Goal OT LTG, [place] Level moderate assist (50% patient effort)   Bathing Goal OT LTG, Assist Device sponge, long handled;tub rails;tub bench with back

## 2018-02-07 LAB
ANION GAP SERPL CALCULATED.3IONS-SCNC: 7 MMOL/L (ref 4–13)
BUN BLD-MCNC: 17 MG/DL (ref 5–21)
BUN/CREAT SERPL: 17.7 (ref 7–25)
CALCIUM SPEC-SCNC: 9 MG/DL (ref 8.4–10.4)
CHLORIDE SERPL-SCNC: 105 MMOL/L (ref 98–110)
CO2 SERPL-SCNC: 32 MMOL/L (ref 24–31)
CREAT BLD-MCNC: 0.96 MG/DL (ref 0.5–1.4)
DEPRECATED RDW RBC AUTO: 45.9 FL (ref 40–54)
ERYTHROCYTE [DISTWIDTH] IN BLOOD BY AUTOMATED COUNT: 13.2 % (ref 12–15)
GFR SERPL CREATININE-BSD FRML MDRD: 56 ML/MIN/1.73
GLUCOSE BLD-MCNC: 115 MG/DL (ref 70–100)
HCT VFR BLD AUTO: 35.9 % (ref 37–47)
HGB BLD-MCNC: 11.6 G/DL (ref 12–16)
MCH RBC QN AUTO: 30.8 PG (ref 28–32)
MCHC RBC AUTO-ENTMCNC: 32.3 G/DL (ref 33–36)
MCV RBC AUTO: 95.2 FL (ref 82–98)
PLATELET # BLD AUTO: 231 10*3/MM3 (ref 130–400)
PMV BLD AUTO: 10.4 FL (ref 6–12)
POTASSIUM BLD-SCNC: 3.9 MMOL/L (ref 3.5–5.3)
RBC # BLD AUTO: 3.77 10*6/MM3 (ref 4.2–5.4)
SODIUM BLD-SCNC: 144 MMOL/L (ref 135–145)
WBC NRBC COR # BLD: 5.78 10*3/MM3 (ref 4.8–10.8)

## 2018-02-07 PROCEDURE — 94799 UNLISTED PULMONARY SVC/PX: CPT

## 2018-02-07 PROCEDURE — 25010000002 CEFTRIAXONE PER 250 MG: Performed by: PHYSICIAN ASSISTANT

## 2018-02-07 PROCEDURE — 80048 BASIC METABOLIC PNL TOTAL CA: CPT | Performed by: FAMILY MEDICINE

## 2018-02-07 PROCEDURE — 85027 COMPLETE CBC AUTOMATED: CPT | Performed by: FAMILY MEDICINE

## 2018-02-07 PROCEDURE — 97110 THERAPEUTIC EXERCISES: CPT

## 2018-02-07 PROCEDURE — 94760 N-INVAS EAR/PLS OXIMETRY 1: CPT

## 2018-02-07 PROCEDURE — 25010000002 METHYLPREDNISOLONE PER 40 MG: Performed by: FAMILY MEDICINE

## 2018-02-07 PROCEDURE — 97116 GAIT TRAINING THERAPY: CPT

## 2018-02-07 RX ORDER — METHYLPREDNISOLONE SODIUM SUCCINATE 40 MG/ML
20 INJECTION, POWDER, LYOPHILIZED, FOR SOLUTION INTRAMUSCULAR; INTRAVENOUS EVERY 12 HOURS
Status: DISCONTINUED | OUTPATIENT
Start: 2018-02-07 | End: 2018-02-09 | Stop reason: HOSPADM

## 2018-02-07 RX ADMIN — CEFTRIAXONE SODIUM 1 G: 1 INJECTION, POWDER, FOR SOLUTION INTRAMUSCULAR; INTRAVENOUS at 09:08

## 2018-02-07 RX ADMIN — FAMOTIDINE 20 MG: 20 TABLET, FILM COATED ORAL at 21:31

## 2018-02-07 RX ADMIN — ALBUTEROL SULFATE 2.5 MG: 2.5 SOLUTION RESPIRATORY (INHALATION) at 10:05

## 2018-02-07 RX ADMIN — ALBUTEROL SULFATE 2.5 MG: 2.5 SOLUTION RESPIRATORY (INHALATION) at 06:26

## 2018-02-07 RX ADMIN — LISINOPRIL 20 MG: 20 TABLET ORAL at 09:08

## 2018-02-07 RX ADMIN — BUMETANIDE 1 MG: 1 TABLET ORAL at 09:08

## 2018-02-07 RX ADMIN — MONTELUKAST SODIUM 10 MG: 10 TABLET, FILM COATED ORAL at 21:31

## 2018-02-07 RX ADMIN — FAMOTIDINE 20 MG: 20 TABLET, FILM COATED ORAL at 09:08

## 2018-02-07 RX ADMIN — ALBUTEROL SULFATE 2.5 MG: 2.5 SOLUTION RESPIRATORY (INHALATION) at 19:25

## 2018-02-07 RX ADMIN — POTASSIUM CHLORIDE 10 MEQ: 750 CAPSULE, EXTENDED RELEASE ORAL at 21:31

## 2018-02-07 RX ADMIN — LEVOTHYROXINE SODIUM 100 MCG: 100 TABLET ORAL at 06:18

## 2018-02-07 RX ADMIN — ATORVASTATIN CALCIUM 10 MG: 10 TABLET, FILM COATED ORAL at 09:08

## 2018-02-07 RX ADMIN — ALBUTEROL SULFATE 2.5 MG: 2.5 SOLUTION RESPIRATORY (INHALATION) at 14:35

## 2018-02-07 RX ADMIN — BUMETANIDE 1 MG: 1 TABLET ORAL at 21:31

## 2018-02-07 RX ADMIN — METHYLPREDNISOLONE SODIUM SUCCINATE 30 MG: 40 INJECTION, POWDER, FOR SOLUTION INTRAMUSCULAR; INTRAVENOUS at 06:18

## 2018-02-07 RX ADMIN — POTASSIUM CHLORIDE 10 MEQ: 750 CAPSULE, EXTENDED RELEASE ORAL at 09:08

## 2018-02-07 RX ADMIN — ALBUTEROL SULFATE 2.5 MG: 2.5 SOLUTION RESPIRATORY (INHALATION) at 01:16

## 2018-02-07 NOTE — PLAN OF CARE
Problem: Patient Care Overview (Adult)  Goal: Plan of Care Review  Outcome: Ongoing (interventions implemented as appropriate)  Patient states she feels better today. Edema is better today po bumex continues no complaints of paint this shift.  Goal: Adult Individualization and Mutuality  Outcome: Ongoing (interventions implemented as appropriate)      Problem: Cellulitis (Adult)  Goal: Signs and Symptoms of Listed Potential Problems Will be Absent or Manageable (Cellulitis)  Outcome: Ongoing (interventions implemented as appropriate)

## 2018-02-07 NOTE — PROGRESS NOTES
[facility]  Daily Progress Note  Carlotta Ortiz  MRN: [id number] LOS: 2    Admit Date: 2/5/2018 2/7/2018 11:53 AM    Subjective:          Chief Complaint:  No chief complaint on file.      Interval History:    Reviewed overnight events and nursing notes.   Status:  SLOWLY IMPROVING  Pain:  some relief    RESPONDING WELL TO TX    ROS:  10 point ROS obtained:   Gen: No fevers  HEENT: No migraine or visual change  Lung: No cough, hemopotysis or wheezing  Cv: No chest pain or pressure  Abd: No nausea or vomit, no change in bowel fct, no gi bleeding  Ext: No inc pain or swelling  Neuro: No seizure or syncope  Skin: No new rashes  Endo: No polyuria, polyphagia or poilydypsia  Psyc: No inc anxiety or depression  MS: No increase in joint pain or swelling reported    DIET:  Diet Regular; Cardiac    Medications:        albuterol 2.5 mg Nebulization Q4H - RT   atorvastatin 10 mg Oral Daily   bumetanide 1 mg Oral BID   ceftriaxone 1 g Intravenous Q24H   enoxaparin 40 mg Subcutaneous Q24H   famotidine 20 mg Oral BID   levoFLOXacin 500 mg Intravenous Q24H   levothyroxine 100 mcg Oral Q24H   lisinopril 20 mg Oral Q24H   methylPREDNISolone sodium succinate 30 mg Intravenous Q12H   montelukast 10 mg Oral Nightly   ondansetron 4 mg Oral Q6H   potassium chloride 10 mEq Oral BID       Data:     Code Status: Full Code    History reviewed. No pertinent family history.  Social History     Social History   • Marital status:      Spouse name: N/A   • Number of children: N/A   • Years of education: N/A     Occupational History   • Not on file.     Social History Main Topics   • Smoking status: Never Smoker   • Smokeless tobacco: Not on file   • Alcohol use No   • Drug use: No   • Sexual activity: Defer     Other Topics Concern   • Not on file     Social History Narrative       Labs:    Lab Results (last 72 hours)     Procedure Component Value Units Date/Time    C-reactive Protein [[id number]]  (Normal) Collected:   02/05/18 1957    Specimen:  Blood Updated:  02/05/18 2016     C-Reactive Protein <0.50 mg/dL     D-dimer, Quantitative [[id number]]  (Abnormal) Collected:  02/05/18 1957    Specimen:  Blood Updated:  02/05/18 2017     D-Dimer, Quantitative 0.63 (H) mg/L (FEU)     Narrative:       Reference Range is 0-0.50 mg/L FEU. However, results <0.50 mg/L FEU tends to rule out DVT or PE. Results >0.50 mg/L FEU are not useful in predicting absence or presence of DVT or PE.    Troponin [[id number]]  (Normal) Collected:  02/05/18 1957    Specimen:  Blood Updated:  02/05/18 2025     Troponin I <0.012 ng/mL     BNP [[id number]]  (Normal) Collected:  02/05/18 1957    Specimen:  Blood Updated:  02/05/18 2025     proBNP 156.0 pg/mL     Procalcitonin [[id number]]  (Normal) Collected:  02/05/18 1957    Specimen:  Blood Updated:  02/05/18 2044     Procalcitonin <0.25 ng/mL     Narrative:       SIRS, sepsis, severe sepsis, and septic shock are categorized according to the criteria of the consensus conference of the American College of Chest Physicians/Society of Critical Care Medicine.    PCT < 0.5 ng/mL     Systemic infection (sepsis) is not likely.    PCT >0.5 and < 2.0 ng/mL Systemic infection (sepsis) is possible, but other conditions are known to elevate PCT as well.    PCT > 2.0 ng/mL     Systemic infection (sepsis) is likely, unless other causes are known.      PCT > 10.0 ng/mL    Important systemic inflammatory response, almost exclusively due to severe bacterial sepsis or septic shock.    PCT values of < 0.5 ng/mL do not exclude an infection, because localized infections (without systemic signs) may be associated with such low concentrations, or a systemic infection in its initial stages (<6 hours).  Increased PCT can occur without infection.  PCT concentrations between 0.5 and 2.0 ng/mL should be interpreted taking into account the patients history.  It is recommended to retest PCT within 6-24 hours if any concentrations < 2.0 ng/mL  "are obtained.    Alpha - 1 - Antitrypsin Deficiency [[id number]] Collected:  18 0437    Specimen:  Blood Updated:  18    Influenza Antigen, Rapid - Swab, Nasopharynx [[id number]]  (Normal) Collected:  18 0505    Specimen:  Swab from Nasopharynx Updated:  18 05     Influenza A Ag, EIA Negative     Influenza B Ag, EIA Negative    Narrative:         Recommend confirmation of negative results by viral culture or molecular assay.    CBC (No Diff) [[id number]]  (Abnormal) Collected:  18    Specimen:  Blood Updated:  18     WBC 5.78 10*3/mm3      RBC 3.77 (L) 10*6/mm3      Hemoglobin 11.6 (L) g/dL      Hematocrit 35.9 (L) %      MCV 95.2 fL      MCH 30.8 pg      MCHC 32.3 (L) g/dL      RDW 13.2 %      RDW-SD 45.9 fl      MPV 10.4 fL      Platelets 231 10*3/mm3     Basic Metabolic Panel [[id number]]  (Abnormal) Collected:  18    Specimen:  Blood Updated:  18     Glucose 115 (H) mg/dL      BUN 17 mg/dL      Creatinine 0.96 mg/dL      Sodium 144 mmol/L      Potassium 3.9 mmol/L      Chloride 105 mmol/L      CO2 32.0 (H) mmol/L      Calcium 9.0 mg/dL      eGFR Non African Amer 56 (L) mL/min/1.73      BUN/Creatinine Ratio 17.7     Anion Gap 7.0 mmol/L     Narrative:       The MDRD GFR formula is only valid for adults with stable renal function between ages 18 and 70.            Objective:     Vitals: /58 (BP Location: Right arm, Patient Position: Lying)  Pulse 65  Temp 98.2 °F (36.8 °C) (Oral)   Resp 18  Ht 149.9 cm (59\")  Wt 84 kg (185 lb 3.2 oz)  LMP  (LMP Unknown)  SpO2 96%  BMI 37.41 kg/m2   Intake/Output Summary (Last 24 hours) at 18 1153  Last data filed at 18 0848   Gross per 24 hour   Intake              720 ml   Output             1150 ml   Net             -430 ml    Temp (24hrs), Av °F (36.7 °C), Min:97.8 °F (36.6 °C), Max:98.3 °F (36.8 °C)        Physical Examination:   General appearance - alert, well appearing, and " in no distress and oriented to person, place, and time  Mental status - alert, oriented to person, place, and time, normal mood, behavior, speech, dress, motor activity, and thought processes  Eyes - pupils equal and reactive, extraocular eye movements intact  Ears - bilateral TM's and external ear canals normal, hearing grossly normal bilaterally  Mouth - mucous membranes moist, pharynx normal without lesions  Neck - supple, no significant adenopathy  Lymphatics - no palpable lymphadenopathy, no hepatosplenomegaly  Chest - DIMINISHING WHEEZE WITH IMPROVING AIR MVT  Heart - normal rate, regular rhythm, normal S1, S2, no murmurs, rubs, clicks or gallops  Abdomen - soft, nontender, nondistended, no masses or organomegaly bowel sounds normal  Neurological - alert, oriented, normal speech, no focal findings or movement disorder noted  Musculoskeletal - no joint tenderness, deformity or swelling  Extremities - DIMINISHING ERYTHEMA LLE    Assessment and Plan:     Primary Problem:  ACUTE EXAC OF ASTHMA  LLE CELLULITIS  Hospital Problem list:  Active Problems:    Asthma attack      PMH:  Past Medical History:   Diagnosis Date   • Asthma    • Cellulitis    • Diabetes mellitus    • Disease of thyroid gland    • Hypercholesteremia    • Hypertension        Treatment Plan:    ACUTE ASTHMA EXACERBATION  CELLULITIS LLE      PLAN:    WEAN STEROID  CONT NEBS  CONT ABX  DIC TO NH IN AM  Discharge planning:   Nursing Home    Reviewed treatment plans with the patient and/or family.   30 minutes spent in face to face interaction and coordination of care.     Electronically signed by Ken Brady MD on 2/7/2018 at 11:53 AM

## 2018-02-07 NOTE — PLAN OF CARE
Problem: Patient Care Overview (Adult)  Goal: Plan of Care Review  Outcome: Ongoing (interventions implemented as appropriate)   02/07/18 1012   Coping/Psychosocial Response Interventions   Plan Of Care Reviewed With patient   Patient Care Overview   Progress progress towards functional goals is fair   Outcome Evaluation   Outcome Summary/Follow up Plan Pt just got up to chair w/ nsg reports being fatigued request to due ex's onlly this am and amb in pm. Pt tolerated BLE A-AAROM x10 reps x2 due to increased pain in right hip. Pt reports feel shes ready to go to SNF and cont therapy

## 2018-02-07 NOTE — PLAN OF CARE
Problem: Patient Care Overview (Adult)  Goal: Plan of Care Review  Outcome: Ongoing (interventions implemented as appropriate)   02/07/18 0204   Coping/Psychosocial Response Interventions   Plan Of Care Reviewed With patient   Patient Care Overview   Progress progress towards functional goals is fair   Outcome Evaluation   Outcome Summary/Follow up Plan refused zofran at 0100; no request for ultram; up to BS     Goal: Adult Individualization and Mutuality  Outcome: Ongoing (interventions implemented as appropriate)   02/05/18 1338   Individualization   Patient Specific Preferences Likes room warm    Patient Specific Goals To go to [facility] upon discharge        Problem: Cellulitis (Adult)  Goal: Signs and Symptoms of Listed Potential Problems Will be Absent or Manageable (Cellulitis)  Outcome: Ongoing (interventions implemented as appropriate)   02/07/18 0204   Cellulitis   Problems Assessed (Cellulitis) all   Problems Present (Cellulitis) pain

## 2018-02-07 NOTE — THERAPY TREATMENT NOTE
Acute Care - Physical Therapy Treatment Note  [facility]     Patient Name: Carlotta Ortiz  : 1940  MRN: [id number]  Today's Date: 2018  Onset of Illness/Injury or Date of Surgery Date: 18  Date of Referral to PT: 18  Referring Physician: Dr. Brady    Admit Date: 2018    Visit Dx:    ICD-10-CM ICD-9-CM   1. Impaired functional mobility, balance, gait, and endurance Z74.09 V49.89   2. Impaired mobility and ADLs Z74.09 799.89     Patient Active Problem List   Diagnosis   • Asthma attack               Adult Rehabilitation Note       18 1558 18 0948 18 1516    Rehab Assessment/Intervention    Discipline physical therapy assistant  -SURYA physical therapy assistant  -NW physical therapy assistant  -CW    Document Type therapy note (daily note)  -SURYA therapy note (daily note)  -NW therapy note (daily note)  -CW    Subjective Information agree to therapy;complains of;weakness;fatigue;pain  -SURYA agree to therapy;complains of;weakness;pain  -NW agree to therapy;complains of;pain;weakness  -CW    Patient Effort, Rehab Treatment  good  -NW good  -CW    Precautions/Limitations fall precautions  -SURYA fall precautions  -NW fall precautions  -CW    Recorded by [SURYA] Len Pelletier PTA [NW] Clotilde Hull PTA [CW] Lillie Oneill PTA    Pain Assessment    Pain Assessment 0-10  -SURYA 0-10  -NW No/denies pain  -CW    [place] FACES Pain Rating  6  -NW     Pain Score 4  -SURYA      Pain Type Chronic pain  -SURYA Chronic pain  -NW     Pain Location Hip  -SURYA Hip  -NW     Pain Orientation Right  -SURYA Right  -NW     Pain Descriptors  Aching;Sharp;Shooting  -NW     Pain Frequency Intermittent  -SURYA Constant/continuous  -NW     Pain Intervention(s) Repositioned  -SURYA Repositioned  -NW     Response to Interventions tolerated  -SURYA      Recorded by [SURYA] Len Pelletier PTA [NW] Clotilde Hull PTA [CW] Lillie Oneill PTA    Cognitive Assessment/Intervention    Personal Safety Interventions   gait  belt;nonskid shoes/slippers when out of bed  -CW    Recorded by   [CW] Lillie Oneill PTA    Bed Mobility, Assessment/Treatment    Bed Mobility, Assistive Device   bed rails  -CW    Bed Mob, Supine to Sit, [place] verbal cues required;minimum assist (75% patient effort)  -SURYA  contact guard assist  -CW    Bed Mob, Sit to Supine, [place] verbal cues required;minimum assist (75% patient effort)  -SURYA  minimum assist (75% patient effort)   assist with BLE  -CW    Bed Mobility, Safety Issues   decreased use of legs for bridging/pushing  -CW    Bed Mobility, Impairments   strength decreased  -CW    Bed Mobility, Comment assist with LE's  -SURYA pt just up to chair w/ nsg c/o fatigue request to ex's and amb this pm  -NW     Recorded by [SURYA] Len Pelletier PTA [NW] Clotilde Hull PTA [CW] Lillie Oneill PTA    Transfer Assessment/Treatment    Transfers, Sit-Stand [place] verbal cues required;contact guard assist  -SURYA  contact guard assist  -CW    Transfers, Stand-Sit [place] verbal cues required;contact guard assist  -SURYA  stand by assist  -CW    Transfers, Sit-Stand-Sit, Assist Device   rolling walker  -CW    Transfer, Impairments   strength decreased  -CW    Recorded by [SURYA] Len Pelletier PTA  [CW] Lillie Oneill PTA    Gait Assessment/Treatment    Gait, [place] Level verbal cues required;contact guard assist  -SURYA  contact guard assist  -CW    Gait, Assistive Device rollator  -SURYA  rollator  -CW    Gait, Distance (Feet) 60  -SURYA  50  -CW    Gait, Gait Deviations gama decreased;step length decreased  -SURYA  gama decreased;step length decreased  -CW    Gait, Safety Issues   weight-shifting ability decreased  -CW    Gait, Impairments   strength decreased  -CW    Recorded by [SURYA] Len Pelletier PTA  [CW] Lillie Oneill PTA    Therapy Exercises    Bilateral Lower Extremities  AROM:;AAROM:;10 reps;supine   10 reps x2 required rest due to pain  -NW AAROM:;10 reps;supine  -CW     BLE Resistance  manual resistance- minimal  -NW     Recorded by  [NW] Clotilde Hull, PTA [CW] Lillie Oneill PTA    Positioning and Restraints    Pre-Treatment Position in bed  -SURYA sitting in chair/recliner  -NW in bed  -CW    Post Treatment Position bed  -SURYA chair  -NW bed  -CW    In Bed fowlers;call light within reach;encouraged to call for assist;side rails up x2  -SURYA  fowlers;call light within reach;encouraged to call for assist;legs elevated  -CW    In Chair  reclined;call light within reach;encouraged to call for assist;notified nsg  -NW     Recorded by [SURYA] Len Pelletier, JENS [NW] Clotilde Hull, PTA [CW] Lillie Oneill PTA      User Key  (r) = Recorded By, (t) = Taken By, (c) = Cosigned By    Initials Name Effective Dates    SURYA Len Pelletier, PTA 12/08/16 -     CW Lillie Oneill, JENS 06/22/15 -     NW Clotilde Hull, JENS 08/02/16 -                 IP PT Goals       02/06/18 1302 02/06/18 1202       Bed Mobility PT LTG    Bed Mobility PT LTG, Date Established 02/06/18  -PB (r) JM (t) PB (c)      Bed Mobility PT LTG, Time to Achieve  by discharge  -PB (r) JM (t) PB (c)     Bed Mobility PT LTG, Activity Type  all bed mobility  -PB (r) JM (t) PB (c)     Bed Mobility PT LTG, [place] Level  conditional independence  -PB (r) JM (t) PB (c)     Bed Mobility PT Goal  LTG, Assist Device  bed rails  -PB (r) JM (t) PB (c)     Transfer Training PT LTG    Transfer Training PT LTG, Date Established  02/06/18  -PB (r) JM (t) PB (c)     Transfer Training PT LTG, Time to Achieve  by discharge  -PB (r) JM (t) PB (c)     Transfer Training PT LTG, Activity Type  bed to chair /chair to bed;sit to stand/stand to sit  -PB (r) JM (t) PB (c)     Transfer Training PT LTG, [place] Level  supervision required  -PB (r) JM (t) PB (c)     Transfer Training PT LTG, Assist Device  walker, rolling platform  -PB (r) ELENI (t) PB (c)     Gait Training PT LTG    Gait Training Goal PT LTG, Date Established   02/06/18  -PB (r) ELENI (t) PB (c)     Gait Training Goal PT LTG, Time to Achieve  by discharge  -PB (r) ELENI (t) PB (c)     Gait Training Goal PT LTG, [place] Level  supervision required  -PB (r) ELENI (t) PB (c)     Gait Training Goal PT LTG, Assist Device  walker, rolling platform  -PB (r) ELENI (t) PB (c)     Gait Training Goal PT LTG, Distance to Achieve  50 feet 1 standing rest break  -PB (r) ELENI (t) PB (c)       User Key  (r) = Recorded By, (t) = Taken By, (c) = Cosigned By    Initials Name Provider Type    PB Rikki Ponce, PT DPT Physical Therapist     Law Gan, PT Student PT Student          Physical Therapy Education     Title: PT OT SLP Therapies (Done)     Topic: Physical Therapy (Done)     Point: Mobility training (Done)    Learning Progress Summary    Learner Readiness Method Response Comment Documented by Status   Patient Acceptance E VU Educated on transfer and bed mobility techniques, benefits of activity, role of PT, POC  02/06/18 1158 Done                      User Key     Initials Effective Dates Name Provider Type Discipline     01/10/18 -  Law Gan, PT Student PT Student PT                    PT Recommendation and Plan  Anticipated Discharge Disposition: extended care facility  Planned Therapy Interventions: balance training, bed mobility training, gait training, patient/family education, strengthening, transfer training  PT Frequency: daily, 2 times/day, per priority policy             Outcome Measures       02/07/18 1000 02/06/18 1200 02/06/18 1100    How much help from another person do you currently need...    Turning from your back to your side while in flat bed without using bedrails? 4  -NW  4  -PB (r) JM (t) PB (c)    Moving from lying on back to sitting on the side of a flat bed without bedrails? 3  -NW  4  -PB (r) JM (t) PB (c)    Moving to and from a bed to a chair (including a wheelchair)? 3  -NW  3  -PB (r) JM (t) PB (c)    Standing up from a chair using your  arms (e.g., wheelchair, bedside chair)? 3  -NW  3  -PB (r) JM (t) PB (c)    Climbing 3-5 steps with a railing? 2  -NW  2  -PB (r) JM (t) PB (c)    To walk in hospital room? 3  -NW  3  -PB (r) JM (t) PB (c)    AM-PAC 6 Clicks Score 18  -NW  19  -PB (r) JM (t)    How much help from another is currently needed...    Putting on and taking off regular lower body clothing?  2  -AC (r) MK (t) AC (c)     Bathing (including washing, rinsing, and drying)  2  -AC (r) MK (t) AC (c)     Toileting (which includes using toilet bed pan or urinal)  3  -AC (r) MK (t) AC (c)     Putting on and taking off regular upper body clothing  3  -AC (r) MK (t) AC (c)     Taking care of personal grooming (such as brushing teeth)  3  -AC (r) MK (t) AC (c)     Eating meals  4  -AC (r) MK (t) AC (c)     Score  17  -AC (r) MK (t)     Functional Assessment    Outcome Measure Options AM-PAC 6 Clicks Basic Mobility (PT)  -NW AM-PAC 6 Clicks Daily Activity (OT)  -AC (r) MK (t) AC (c) AM-PAC 6 Clicks Basic Mobility (PT)  -PB (r) JM (t) PB (c)      User Key  (r) = Recorded By, (t) = Taken By, (c) = Cosigned By    Initials Name Provider Type    AC Patric Hung, OTR/L Occupational Therapist    NW Clotilde Hull, PTA Physical Therapy Assistant    JESSICA Ponce, PT DPT Physical Therapist    ELENI Gan, PT Student PT Student    SHASHI Wilder, OT Student OT Student           Time Calculation:         PT Charges       02/07/18 1558 02/07/18 1011       Time Calculation    Start Time 1558  -SURYA 0948  -NW     Stop Time 1632  -SURYA 1011  -NW     Time Calculation (min) 34 min  -SURYA 23 min  -NW     PT Received On 02/07/18  -SURYA 02/07/18  -NW     PT Goal Re-Cert Due Date 02/16/18  -SURYA 02/16/18  -NW     Time Calculation- PT    Total Timed Code Minutes- PT 34 minute(s)  -SURYA 23 minute(s)  -NW       User Key  (r) = Recorded By, (t) = Taken By, (c) = Cosigned By    Initials Name Provider Type    SURYA Pelletier, PTA Physical Therapy Assistant    NW  Clotilde Hull PTA Physical Therapy Assistant          Therapy Charges for Today     Code Description Service Date Service Provider Modifiers Qty    [id number] HC GAIT TRAINING EA 15 MIN 2/7/2018 Len Pelletier PTA GP, KX 2          PT G-Codes  Outcome Measure Options: AM-PAC 6 Clicks Basic Mobility (PT)  Score: 19  Functional Limitation: Mobility: Walking and moving around  Mobility: Walking and Moving Around Current Status (): At least 20 percent but less than 40 percent impaired, limited or restricted  Mobility: Walking and Moving Around Goal Status (): At least 1 percent but less than 20 percent impaired, limited or restricted    Len Pelletier PTA  2/7/2018

## 2018-02-07 NOTE — PROGRESS NOTES
Continued Stay Note   [facility]     Patient Name: Carlotta Ortiz  MRN: [id number]  Today's Date: 2/7/2018    Admit Date: 2/5/2018          Discharge Plan       02/07/18 1114    Case Management/Social Work Plan    Plan [facility] HAS OFFERED A BED. BED WILL BE READY TOMORROW IF PT READY FOR DC. PT AWARE AND ACCEPTS BED OFFER. PT WILL BE SKILLED LEVEL OF CARE.     Patient/Family In Agreement With Plan yes              Discharge Codes     None            NEFTALI Wells

## 2018-02-08 PROCEDURE — 94760 N-INVAS EAR/PLS OXIMETRY 1: CPT

## 2018-02-08 PROCEDURE — 94799 UNLISTED PULMONARY SVC/PX: CPT

## 2018-02-08 PROCEDURE — 97110 THERAPEUTIC EXERCISES: CPT

## 2018-02-08 PROCEDURE — 25010000002 METHYLPREDNISOLONE PER 40 MG: Performed by: FAMILY MEDICINE

## 2018-02-08 PROCEDURE — 25010000002 CEFTRIAXONE PER 250 MG: Performed by: FAMILY MEDICINE

## 2018-02-08 PROCEDURE — 97535 SELF CARE MNGMENT TRAINING: CPT

## 2018-02-08 PROCEDURE — 25010000002 ENOXAPARIN PER 10 MG: Performed by: FAMILY MEDICINE

## 2018-02-08 RX ADMIN — FAMOTIDINE 20 MG: 20 TABLET, FILM COATED ORAL at 20:50

## 2018-02-08 RX ADMIN — MAGNESIUM HYDROXIDE 10 ML: 2400 SUSPENSION ORAL at 20:50

## 2018-02-08 RX ADMIN — BUMETANIDE 1 MG: 1 TABLET ORAL at 08:32

## 2018-02-08 RX ADMIN — TRAMADOL HYDROCHLORIDE 50 MG: 50 TABLET, COATED ORAL at 04:34

## 2018-02-08 RX ADMIN — MONTELUKAST SODIUM 10 MG: 10 TABLET, FILM COATED ORAL at 20:50

## 2018-02-08 RX ADMIN — METHYLPREDNISOLONE SODIUM SUCCINATE 20 MG: 40 INJECTION, POWDER, FOR SOLUTION INTRAMUSCULAR; INTRAVENOUS at 06:22

## 2018-02-08 RX ADMIN — ATORVASTATIN CALCIUM 10 MG: 10 TABLET, FILM COATED ORAL at 08:32

## 2018-02-08 RX ADMIN — POTASSIUM CHLORIDE 10 MEQ: 750 CAPSULE, EXTENDED RELEASE ORAL at 20:50

## 2018-02-08 RX ADMIN — ALBUTEROL SULFATE 2.5 MG: 2.5 SOLUTION RESPIRATORY (INHALATION) at 14:37

## 2018-02-08 RX ADMIN — LISINOPRIL 20 MG: 20 TABLET ORAL at 08:32

## 2018-02-08 RX ADMIN — ALBUTEROL SULFATE 2.5 MG: 2.5 SOLUTION RESPIRATORY (INHALATION) at 03:29

## 2018-02-08 RX ADMIN — METHYLPREDNISOLONE SODIUM SUCCINATE 20 MG: 40 INJECTION, POWDER, FOR SOLUTION INTRAMUSCULAR; INTRAVENOUS at 18:21

## 2018-02-08 RX ADMIN — LEVOTHYROXINE SODIUM 100 MCG: 100 TABLET ORAL at 06:23

## 2018-02-08 RX ADMIN — ALBUTEROL SULFATE 2.5 MG: 2.5 SOLUTION RESPIRATORY (INHALATION) at 00:08

## 2018-02-08 RX ADMIN — BUMETANIDE 1 MG: 1 TABLET ORAL at 20:50

## 2018-02-08 RX ADMIN — ALBUTEROL SULFATE 2.5 MG: 2.5 SOLUTION RESPIRATORY (INHALATION) at 21:08

## 2018-02-08 RX ADMIN — FAMOTIDINE 20 MG: 20 TABLET, FILM COATED ORAL at 08:32

## 2018-02-08 RX ADMIN — CEFTRIAXONE SODIUM 1 G: 1 INJECTION, POWDER, FOR SOLUTION INTRAMUSCULAR; INTRAVENOUS at 08:39

## 2018-02-08 RX ADMIN — ALBUTEROL SULFATE 2.5 MG: 2.5 SOLUTION RESPIRATORY (INHALATION) at 10:22

## 2018-02-08 RX ADMIN — ENOXAPARIN SODIUM 40 MG: 40 INJECTION SUBCUTANEOUS at 18:21

## 2018-02-08 RX ADMIN — POTASSIUM CHLORIDE 10 MEQ: 750 CAPSULE, EXTENDED RELEASE ORAL at 08:31

## 2018-02-08 NOTE — PLAN OF CARE
Problem: Patient Care Overview (Adult)  Goal: Plan of Care Review  Outcome: Ongoing (interventions implemented as appropriate)   02/08/18 6889   Coping/Psychosocial Response Interventions   Plan Of Care Reviewed With patient   Patient Care Overview   Progress progress toward functional goals as expected   Outcome Evaluation   Outcome Summary/Follow up Plan Pt. had no problems with transfers or amb with rwx bed-chair, no ae needed or wanted!

## 2018-02-08 NOTE — PLAN OF CARE
Problem: Patient Care Overview (Adult)  Goal: Plan of Care Review  Outcome: Ongoing (interventions implemented as appropriate)  Pt awake and alert. C/O chronic right hip pain, achy and mild pain. PRN pain medications given with good relief/control of pain. Continue IV antibiotics. Bilateral lower extremities with pitting edema and redness. Encourage activity. Continue physical therapy. D/C to NH, [facility], when MD ready for D/C.  Goal: Adult Individualization and Mutuality  Outcome: Ongoing (interventions implemented as appropriate)   02/08/18 1220   Individualization   Patient Specific Goals Increase indepence of ADL's. Resolved lower extrmemits cellulitis.    Patient Specific Interventions Physical therapy. IV antibiotics. D/C to [facility].     Goal: Discharge Needs Assessment  Outcome: Ongoing (interventions implemented as appropriate)      Problem: Cellulitis (Adult)  Goal: Signs and Symptoms of Listed Potential Problems Will be Absent or Manageable (Cellulitis)  Outcome: Ongoing (interventions implemented as appropriate)   02/08/18 1220   Cellulitis   Problems Assessed (Cellulitis) all   Problems Present (Cellulitis) pain;infection

## 2018-02-08 NOTE — PLAN OF CARE
Problem: Patient Care Overview (Adult)  Goal: Plan of Care Review  Outcome: Ongoing (interventions implemented as appropriate)   02/08/18 1032   Coping/Psychosocial Response Interventions   Plan Of Care Reviewed With patient   Outcome Evaluation   Outcome Summary/Follow up Plan Initial dietary assessment. Pt reports a good appetite. She told this dietetic intern she has had some taste changes the past year. Meats and vegetables are hardest for her to consume because they do not taste as good to her as they used to. She also reported she watches her salt intake. Currently on a regular, cardiac diet. 45% intake recorded at this time. Cont to encourage intake and will cont to follow.

## 2018-02-08 NOTE — THERAPY TREATMENT NOTE
Acute Care - Physical Therapy Treatment Note  [facility]     Patient Name: Carlotta Ortiz  : 1940  MRN: [id number]  Today's Date: 2018  Onset of Illness/Injury or Date of Surgery Date: 18  Date of Referral to PT: 18  Referring Physician: Dr. Brady    Admit Date: 2018    Visit Dx:    ICD-10-CM ICD-9-CM   1. Impaired functional mobility, balance, gait, and endurance Z74.09 V49.89   2. Impaired mobility and ADLs Z74.09 799.89     Patient Active Problem List   Diagnosis   • Asthma attack               Adult Rehabilitation Note       18 0941 18 1558 18 0948    Rehab Assessment/Intervention    Discipline physical therapy assistant  -LG physical therapy assistant  -SURYA physical therapy assistant  -NW    Document Type therapy note (daily note)  -LG therapy note (daily note)  -SURYA therapy note (daily note)  -NW    Subjective Information agree to therapy;no complaints  -LG agree to therapy;complains of;weakness;fatigue;pain  -SURYA agree to therapy;complains of;weakness;pain  -NW    Patient Effort, Rehab Treatment good  -LG  good  -NW    Precautions/Limitations fall precautions  -LG fall precautions  -SURYA fall precautions  -NW    Recorded by [LG] Saleem Kenney PTA [SURYA] Len Pelletier PTA [NW] Clotilde Hull PTA    Pain Assessment    Pain Assessment 0-10  -LG 0-10  -SURYA 0-10  -NW    Portillo[place]Feliz FACES Pain Rating   6  -NW    Pain Score 4  -LG 4  -SURYA     Pain Type Chronic pain  -LG Chronic pain  -SURYA Chronic pain  -NW    Pain Location Hip  -LG Hip  -SURYA Hip  -NW    Pain Orientation Right  -LG Right  -SURYA Right  -NW    Pain Descriptors   Aching;Sharp;Shooting  -NW    Pain Frequency  Intermittent  -SURYA Constant/continuous  -NW    Pain Intervention(s)  Repositioned  -SURYA Repositioned  -NW    Response to Interventions  tolerated  -SURYA     Recorded by [LG] Saleem Kenney PTA [SURYA] Len Pelletier PTA [NW] Clotilde Hull PTA    Bed Mobility, Assessment/Treatment    Bed Mob, Supine to Sit,  [place]  verbal cues required;minimum assist (75% patient effort)  -SURYA     Bed Mob, Sit to Supine, [place]  verbal cues required;minimum assist (75% patient effort)  -SURYA     Bed Mobility, Comment pt only wanted to do ther ex at this time  -LG assist with LE's  -SURYA pt just up to chair w/ nsg c/o fatigue request to ex's and amb this pm  -NW    Recorded by [LG] Saleem Kenney PTA [SURYA] Len Pelletier PTA [NW] Clotilde Hull PTA    Transfer Assessment/Treatment    Transfers, Sit-Stand [place]  verbal cues required;contact guard assist  -SURYA     Transfers, Stand-Sit [place]  verbal cues required;contact guard assist  -SURYA     Recorded by  [SURYA] Len Pelletier PTA     Gait Assessment/Treatment    Gait, [place] Level  verbal cues required;contact guard assist  -SURYA     Gait, Assistive Device  rollator  -SURYA     Gait, Distance (Feet)  60  -SURYA     Gait, Gait Deviations  gama decreased;step length decreased  -SURYA     Recorded by  [SURYA] Len Pelletier PTA     Therapy Exercises    Bilateral Lower Extremities AAROM:;AROM:;20 reps;supine   2 sets of 10 reps  -  AROM:;AAROM:;10 reps;supine   10 reps x2 required rest due to pain  -NW    BLE Resistance   manual resistance- minimal  -NW    Recorded by [LG] Saleem Kenney PTA  [NW] Clotilde Hull PTA    Positioning and Restraints    Pre-Treatment Position in bed  -LG in bed  -SURYA sitting in chair/recliner  -NW    Post Treatment Position bed  -LG bed  -SURYA chair  -NW    In Bed fowlers;call light within reach;encouraged to call for assist   left with rep from [facility].   -LG fowlers;call light within reach;encouraged to call for assist;side rails up x2  -SURYA     In Chair   reclined;call light within reach;encouraged to call for assist;notified nsg  -NW    Recorded by [LG] Saleem Kenney PTA [USRYA] Len Pelletier PTA [NW] Clotilde Hull PTA      02/06/18 1516          Rehab Assessment/Intervention    Discipline physical therapy assistant  -CW       Document Type therapy note (daily note)  -CW      Subjective Information agree to therapy;complains of;pain;weakness  -CW      Patient Effort, Rehab Treatment good  -CW      Precautions/Limitations fall precautions  -CW      Recorded by [CW] Lillie Oneill PTA      Pain Assessment    Pain Assessment No/denies pain  -CW      Recorded by [CW] Lillie Oneill PTA      Cognitive Assessment/Intervention    Personal Safety Interventions gait belt;nonskid shoes/slippers when out of bed  -CW      Recorded by [CW] Lillie Oneill PTA      Bed Mobility, Assessment/Treatment    Bed Mobility, Assistive Device bed rails  -CW      Bed Mob, Supine to Sit, [place] contact guard assist  -CW      Bed Mob, Sit to Supine, [place] minimum assist (75% patient effort)   assist with BLE  -CW      Bed Mobility, Safety Issues decreased use of legs for bridging/pushing  -CW      Bed Mobility, Impairments strength decreased  -CW      Recorded by [CW] Lillie Oneill PTA      Transfer Assessment/Treatment    Transfers, Sit-Stand [place] contact guard assist  -CW      Transfers, Stand-Sit [place] stand by assist  -CW      Transfers, Sit-Stand-Sit, Assist Device rolling walker  -CW      Transfer, Impairments strength decreased  -CW      Recorded by [CW] Lillie Oneill PTA      Gait Assessment/Treatment    Gait, [place] Level contact guard assist  -CW      Gait, Assistive Device rollator  -CW      Gait, Distance (Feet) 50  -CW      Gait, Gait Deviations gama decreased;step length decreased  -CW      Gait, Safety Issues weight-shifting ability decreased  -CW      Gait, Impairments strength decreased  -CW      Recorded by [CW] Lillie Oneill PTA      Therapy Exercises    Bilateral Lower Extremities AAROM:;10 reps;supine  -CW      Recorded by [CW] Lillie Oneill PTA      Positioning and Restraints    Pre-Treatment Position in bed  -CW      Post Treatment Position bed  -CW      In Bed  fowlers;call light within reach;encouraged to call for assist;legs elevated  -CW      Recorded by [CW] Lillie Oneill, PTA        User Key  (r) = Recorded By, (t) = Taken By, (c) = Cosigned By    Initials Name Effective Dates    LG Saleem Kenney, PTA 08/02/16 -     SURYA Len Pelletier, PTA 12/08/16 -     CW Lillie Oneill, PTA 06/22/15 -     NW Clotilde Hull, PTA 08/02/16 -                 IP PT Goals       02/06/18 1302 02/06/18 1202       Bed Mobility PT LTG    Bed Mobility PT LTG, Date Established 02/06/18  -PB (r) JM (t) PB (c)      Bed Mobility PT LTG, Time to Achieve  by discharge  -PB (r) JM (t) PB (c)     Bed Mobility PT LTG, Activity Type  all bed mobility  -PB (r) JM (t) PB (c)     Bed Mobility PT LTG, [place] Level  conditional independence  -PB (r) JM (t) PB (c)     Bed Mobility PT Goal  LTG, Assist Device  bed rails  -PB (r) JM (t) PB (c)     Transfer Training PT LTG    Transfer Training PT LTG, Date Established  02/06/18  -PB (r) JM (t) PB (c)     Transfer Training PT LTG, Time to Achieve  by discharge  -PB (r) JM (t) PB (c)     Transfer Training PT LTG, Activity Type  bed to chair /chair to bed;sit to stand/stand to sit  -PB (r) JM (t) PB (c)     Transfer Training PT LTG, [place] Level  supervision required  -PB (r) JM (t) PB (c)     Transfer Training PT LTG, Assist Device  walker, rolling platform  -PB (r) JM (t) PB (c)     Gait Training PT LTG    Gait Training Goal PT LTG, Date Established  02/06/18  -PB (r) JM (t) PB (c)     Gait Training Goal PT LTG, Time to Achieve  by discharge  -PB (r) JM (t) PB (c)     Gait Training Goal PT LTG, [place] Level  supervision required  -PB (r) JM (t) PB (c)     Gait Training Goal PT LTG, Assist Device  walker, rolling platform  -PB (r) JM (t) PB (c)     Gait Training Goal PT LTG, Distance to Achieve  50 feet 1 standing rest break  -PB (r) JM (t) PB (c)       User Key  (r) = Recorded By, (t) = Taken By, (c) = Cosigned By    Initials Name  Provider Type    PB Rikki Ponce, PT DPT Physical Therapist     Law Gan, PT Student PT Student          Physical Therapy Education     Title: PT OT SLP Therapies (Done)     Topic: Physical Therapy (Done)     Point: Mobility training (Done)    Learning Progress Summary    Learner Readiness Method Response Comment Documented by Status   Patient Acceptance E DU Educated on benefits of activity  02/08/18 0941 Done    Acceptance E VU Educated on transfer and bed mobility techniques, benefits of activity, role of PT, POC  02/06/18 1158 Done                      User Key     Initials Effective Dates Name Provider Type Discipline     08/02/16 -  Saleem Kenney, PTA Physical Therapy Assistant PT     01/10/18 -  Law Gan, PT Student PT Student PT                    PT Recommendation and Plan  Anticipated Discharge Disposition: extended care facility  Planned Therapy Interventions: balance training, bed mobility training, gait training, patient/family education, strengthening, transfer training  PT Frequency: daily, 2 times/day, per priority policy  Plan of Care Review  Plan Of Care Reviewed With: patient  Progress: progress toward functional goals is gradual  Outcome Summary/Follow up Plan: Pt awake/alert fowlers in bed. Pt only wanted to do ther ex this a.m. due to just getting back to bed and getting comfortable. Completed 20 Reps B LE with HARRIET. Pt reports possible d/c to [facility] today.           Outcome Measures       02/08/18 1001 02/07/18 1000 02/06/18 1200    How much help from another person do you currently need...    Turning from your back to your side while in flat bed without using bedrails? 4  -LG 4  -NW     Moving from lying on back to sitting on the side of a flat bed without bedrails? 3  -LG 3  -NW     Moving to and from a bed to a chair (including a wheelchair)? 3  -LG 3  -NW     Standing up from a chair using your arms (e.g., wheelchair, bedside chair)? 3  -LG 3  -NW      Climbing 3-5 steps with a railing? 2  -LG 2  -NW     To walk in hospital room? 3  -LG 3  -NW     AM-PAC 6 Clicks Score 18  -LG 18  -NW     How much help from another is currently needed...    Putting on and taking off regular lower body clothing?   2  -AC (r) MK (t) AC (c)    Bathing (including washing, rinsing, and drying)   2  -AC (r) MK (t) AC (c)    Toileting (which includes using toilet bed pan or urinal)   3  -AC (r) MK (t) AC (c)    Putting on and taking off regular upper body clothing   3  -AC (r) MK (t) AC (c)    Taking care of personal grooming (such as brushing teeth)   3  -AC (r) MK (t) AC (c)    Eating meals   4  -AC (r) MK (t) AC (c)    Score   17  -AC (r) MK (t)    Functional Assessment    Outcome Measure Options AM-PAC 6 Clicks Basic Mobility (PT)  -LG AM-PAC 6 Clicks Basic Mobility (PT)  -NW AM-PAC 6 Clicks Daily Activity (OT)  -AC (r) MK (t) AC (c)      02/06/18 1100          How much help from another person do you currently need...    Turning from your back to your side while in flat bed without using bedrails? 4  -PB (r) JM (t) PB (c)      Moving from lying on back to sitting on the side of a flat bed without bedrails? 4  -PB (r) JM (t) PB (c)      Moving to and from a bed to a chair (including a wheelchair)? 3  -PB (r) JM (t) PB (c)      Standing up from a chair using your arms (e.g., wheelchair, bedside chair)? 3  -PB (r) JM (t) PB (c)      Climbing 3-5 steps with a railing? 2  -PB (r) JM (t) PB (c)      To walk in hospital room? 3  -PB (r) JM (t) PB (c)      AM-PAC 6 Clicks Score 19  -PB (r) JM (t)      Functional Assessment    Outcome Measure Options AM-PAC 6 Clicks Basic Mobility (PT)  -PB (r) JM (t) PB (c)        User Key  (r) = Recorded By, (t) = Taken By, (c) = Cosigned By    Initials Name Provider Type    AC Patric Hung, OTR/L Occupational Therapist    LG Saleem Kenney, PTA Physical Therapy Assistant    NW Clotilde Hull, PTA Physical Therapy Assistant    PB Rikki Ponce, PT DPT  Physical Therapist    ELENI Gan, PT Student PT Student    SHASHI Wilder, OT Student OT Student           Time Calculation:         PT Charges       02/08/18 0941          Time Calculation    Start Time 0941  -LG        User Key  (r) = Recorded By, (t) = Taken By, (c) = Cosigned By    Initials Name Provider Type    LG Saleem Kenney PTA Physical Therapy Assistant          Therapy Charges for Today     Code Description Service Date Service Provider Modifiers Qty    94446705605 HC PT THER PROC EA 15 MIN 2/8/2018 Saleem Kenney PTA GP, KX 2          PT G-Codes  Outcome Measure Options: AM-PAC 6 Clicks Basic Mobility (PT)  Score: 19  Functional Limitation: Mobility: Walking and moving around  Mobility: Walking and Moving Around Current Status (): At least 20 percent but less than 40 percent impaired, limited or restricted  Mobility: Walking and Moving Around Goal Status (): At least 1 percent but less than 20 percent impaired, limited or restricted    Saleem Kenney PTA  2/8/2018

## 2018-02-08 NOTE — PLAN OF CARE
Problem: Patient Care Overview (Adult)  Goal: Plan of Care Review  Outcome: Ongoing (interventions implemented as appropriate)   02/08/18 0941   Coping/Psychosocial Response Interventions   Plan Of Care Reviewed With patient   Patient Care Overview   Progress progress toward functional goals is gradual   Outcome Evaluation   Outcome Summary/Follow up Plan Pt awake/alert fowlers in bed. Pt only wanted to do ther ex this a.m. due to just getting back to bed and getting comfortable. Completed 20 Reps B LE with HARRIET. Pt reports possible d/c to [facility] today.

## 2018-02-08 NOTE — PLAN OF CARE
Problem: Patient Care Overview (Adult)  Goal: Plan of Care Review  Outcome: Ongoing (interventions implemented as appropriate)   02/08/18 0318   Coping/Psychosocial Response Interventions   Plan Of Care Reviewed With patient   Patient Care Overview   Progress progress towards functional goals is fair     Goal: Adult Individualization and Mutuality  Outcome: Ongoing (interventions implemented as appropriate)    Goal: Discharge Needs Assessment  Outcome: Ongoing (interventions implemented as appropriate)      Problem: Cellulitis (Adult)  Goal: Signs and Symptoms of Listed Potential Problems Will be Absent or Manageable (Cellulitis)  Outcome: Ongoing (interventions implemented as appropriate)

## 2018-02-09 VITALS
DIASTOLIC BLOOD PRESSURE: 57 MMHG | RESPIRATION RATE: 18 BRPM | BODY MASS INDEX: 37.34 KG/M2 | WEIGHT: 185.2 LBS | HEIGHT: 59 IN | TEMPERATURE: 98.2 F | SYSTOLIC BLOOD PRESSURE: 127 MMHG | HEART RATE: 61 BPM | OXYGEN SATURATION: 95 %

## 2018-02-09 LAB
PATHOLOGIST NAME: NORMAL
SERPINA1 GENE MUT ANL BLD/T: NORMAL
SERPINA1 GENE MUT TESTED BLD/T: NORMAL

## 2018-02-09 PROCEDURE — 25010000002 METHYLPREDNISOLONE PER 40 MG: Performed by: FAMILY MEDICINE

## 2018-02-09 PROCEDURE — 25010000002 CEFTRIAXONE PER 250 MG: Performed by: FAMILY MEDICINE

## 2018-02-09 PROCEDURE — 94799 UNLISTED PULMONARY SVC/PX: CPT

## 2018-02-09 RX ORDER — TRAMADOL HYDROCHLORIDE 50 MG/1
50 TABLET ORAL 2 TIMES DAILY PRN
Qty: 60 TABLET | Refills: 0 | Status: SHIPPED | OUTPATIENT
Start: 2018-02-09 | End: 2018-03-24 | Stop reason: SDUPTHER

## 2018-02-09 RX ORDER — CEFDINIR 300 MG/1
300 CAPSULE ORAL 2 TIMES DAILY
Qty: 14 CAPSULE | Refills: 0 | Status: SHIPPED | OUTPATIENT
Start: 2018-02-09 | End: 2018-02-16

## 2018-02-09 RX ORDER — METHYLPREDNISOLONE 4 MG/1
TABLET ORAL
Qty: 1 EACH | Refills: 0 | Status: SHIPPED | OUTPATIENT
Start: 2018-02-09 | End: 2018-03-24

## 2018-02-09 RX ORDER — ONDANSETRON 4 MG/1
4 TABLET, FILM COATED ORAL EVERY 6 HOURS PRN
Status: DISCONTINUED | OUTPATIENT
Start: 2018-02-09 | End: 2018-02-09 | Stop reason: HOSPADM

## 2018-02-09 RX ORDER — BISACODYL 10 MG
10 SUPPOSITORY, RECTAL RECTAL DAILY
Status: DISCONTINUED | OUTPATIENT
Start: 2018-02-09 | End: 2018-02-09 | Stop reason: HOSPADM

## 2018-02-09 RX ADMIN — ALBUTEROL SULFATE 2.5 MG: 2.5 SOLUTION RESPIRATORY (INHALATION) at 03:17

## 2018-02-09 RX ADMIN — BISACODYL 10 MG: 10 SUPPOSITORY RECTAL at 08:27

## 2018-02-09 RX ADMIN — ONDANSETRON 4 MG: 4 TABLET, FILM COATED ORAL at 06:26

## 2018-02-09 RX ADMIN — ALBUTEROL SULFATE 2.5 MG: 2.5 SOLUTION RESPIRATORY (INHALATION) at 07:14

## 2018-02-09 RX ADMIN — CEFTRIAXONE SODIUM 1 G: 1 INJECTION, POWDER, FOR SOLUTION INTRAMUSCULAR; INTRAVENOUS at 08:34

## 2018-02-09 RX ADMIN — METHYLPREDNISOLONE SODIUM SUCCINATE 20 MG: 40 INJECTION, POWDER, FOR SOLUTION INTRAMUSCULAR; INTRAVENOUS at 06:26

## 2018-02-09 RX ADMIN — FAMOTIDINE 20 MG: 20 TABLET, FILM COATED ORAL at 08:27

## 2018-02-09 RX ADMIN — ATORVASTATIN CALCIUM 10 MG: 10 TABLET, FILM COATED ORAL at 08:27

## 2018-02-09 RX ADMIN — ONDANSETRON 4 MG: 4 TABLET, FILM COATED ORAL at 01:23

## 2018-02-09 RX ADMIN — BUMETANIDE 1 MG: 1 TABLET ORAL at 08:27

## 2018-02-09 RX ADMIN — LEVOTHYROXINE SODIUM 100 MCG: 100 TABLET ORAL at 06:26

## 2018-02-09 RX ADMIN — POTASSIUM CHLORIDE 10 MEQ: 750 CAPSULE, EXTENDED RELEASE ORAL at 08:27

## 2018-02-09 RX ADMIN — LISINOPRIL 20 MG: 20 TABLET ORAL at 08:27

## 2018-02-09 NOTE — PLAN OF CARE
Problem: Patient Care Overview (Adult)  Goal: Plan of Care Review  Outcome: Ongoing (interventions implemented as appropriate)   02/08/18 1509 02/09/18 0444   Coping/Psychosocial Response Interventions   Plan Of Care Reviewed With patient --    Patient Care Overview   Progress --  no change   Outcome Evaluation   Outcome Summary/Follow up Plan --  Pt has had no c/o pain. Up with assist X 1 to [facility] with no problems.     Goal: Adult Individualization and Mutuality  Outcome: Ongoing (interventions implemented as appropriate)    Goal: Discharge Needs Assessment  Outcome: Ongoing (interventions implemented as appropriate)      Problem: Cellulitis (Adult)  Goal: Signs and Symptoms of Listed Potential Problems Will be Absent or Manageable (Cellulitis)  Outcome: Ongoing (interventions implemented as appropriate)

## 2018-02-09 NOTE — PROGRESS NOTES
[facility]  Daily Progress Note  Carlotta Ortiz  MRN: [id number] LOS: 3    Admit Date: 2/5/2018 2/8/2018 7:04 PM    Subjective:          Chief Complaint:  No chief complaint on file.      Interval History:    Reviewed overnight events and nursing notes.   Status:  SLOWLY IMPROVING  Pain:  some relief    RESPONDING WELL TO TX    ROS:  10 point ROS obtained:   Gen: No fevers  HEENT: No migraine or visual change  Lung: No cough, hemopotysis or wheezing  Cv: No chest pain or pressure  Abd: No nausea or vomit, no change in bowel fct, no gi bleeding  Ext: No inc pain or swelling  Neuro: No seizure or syncope  Skin: No new rashes  Endo: No polyuria, polyphagia or poilydypsia  Psyc: No inc anxiety or depression  MS: No increase in joint pain or swelling reported    DIET:  Diet Regular; Cardiac    Medications:        albuterol 2.5 mg Nebulization Q4H - RT   atorvastatin 10 mg Oral Daily   bumetanide 1 mg Oral BID   ceftriaxone 1 g Intravenous Q24H   enoxaparin 40 mg Subcutaneous Q24H   famotidine 20 mg Oral BID   levothyroxine 100 mcg Oral Q24H   lisinopril 20 mg Oral Q24H   methylPREDNISolone sodium succinate 20 mg Intravenous Q12H   montelukast 10 mg Oral Nightly   ondansetron 4 mg Oral Q6H   potassium chloride 10 mEq Oral BID       Data:     Code Status: Full Code    History reviewed. No pertinent family history.  Social History     Social History   • Marital status:      Spouse name: N/A   • Number of children: N/A   • Years of education: N/A     Occupational History   • Not on file.     Social History Main Topics   • Smoking status: Never Smoker   • Smokeless tobacco: Not on file   • Alcohol use No   • Drug use: No   • Sexual activity: Defer     Other Topics Concern   • Not on file     Social History Narrative       Labs:    Lab Results (last 72 hours)     Procedure Component Value Units Date/Time    C-reactive Protein [[id number]]  (Normal) Collected:  02/05/18 1957    Specimen:  Blood Updated:   02/05/18 2016     C-Reactive Protein <0.50 mg/dL     D-dimer, Quantitative [[id number]]  (Abnormal) Collected:  02/05/18 1957    Specimen:  Blood Updated:  02/05/18 2017     D-Dimer, Quantitative 0.63 (H) mg/L (FEU)     Narrative:       Reference Range is 0-0.50 mg/L FEU. However, results <0.50 mg/L FEU tends to rule out DVT or PE. Results >0.50 mg/L FEU are not useful in predicting absence or presence of DVT or PE.    Troponin [[id number]]  (Normal) Collected:  02/05/18 1957    Specimen:  Blood Updated:  02/05/18 2025     Troponin I <0.012 ng/mL     BNP [[id number]]  (Normal) Collected:  02/05/18 1957    Specimen:  Blood Updated:  02/05/18 2025     proBNP 156.0 pg/mL     Procalcitonin [[id number]]  (Normal) Collected:  02/05/18 1957    Specimen:  Blood Updated:  02/05/18 2044     Procalcitonin <0.25 ng/mL     Narrative:       SIRS, sepsis, severe sepsis, and septic shock are categorized according to the criteria of the consensus conference of the American College of Chest Physicians/Society of Critical Care Medicine.    PCT < 0.5 ng/mL     Systemic infection (sepsis) is not likely.    PCT >0.5 and < 2.0 ng/mL Systemic infection (sepsis) is possible, but other conditions are known to elevate PCT as well.    PCT > 2.0 ng/mL     Systemic infection (sepsis) is likely, unless other causes are known.      PCT > 10.0 ng/mL    Important systemic inflammatory response, almost exclusively due to severe bacterial sepsis or septic shock.    PCT values of < 0.5 ng/mL do not exclude an infection, because localized infections (without systemic signs) may be associated with such low concentrations, or a systemic infection in its initial stages (<6 hours).  Increased PCT can occur without infection.  PCT concentrations between 0.5 and 2.0 ng/mL should be interpreted taking into account the patients history.  It is recommended to retest PCT within 6-24 hours if any concentrations < 2.0 ng/mL are obtained.    Alpha - 1 - Antitrypsin  "Deficiency [[id number]] Collected:  18 0437    Specimen:  Blood Updated:  18 0519    Influenza Antigen, Rapid - Swab, Nasopharynx [[id number]]  (Normal) Collected:  18 0505    Specimen:  Swab from Nasopharynx Updated:  18     Influenza A Ag, EIA Negative     Influenza B Ag, EIA Negative    Narrative:         Recommend confirmation of negative results by viral culture or molecular assay.    CBC (No Diff) [[id number]]  (Abnormal) Collected:  18    Specimen:  Blood Updated:  18     WBC 5.78 10*3/mm3      RBC 3.77 (L) 10*6/mm3      Hemoglobin 11.6 (L) g/dL      Hematocrit 35.9 (L) %      MCV 95.2 fL      MCH 30.8 pg      MCHC 32.3 (L) g/dL      RDW 13.2 %      RDW-SD 45.9 fl      MPV 10.4 fL      Platelets 231 10*3/mm3     Basic Metabolic Panel [[id number]]  (Abnormal) Collected:  18    Specimen:  Blood Updated:  18     Glucose 115 (H) mg/dL      BUN 17 mg/dL      Creatinine 0.96 mg/dL      Sodium 144 mmol/L      Potassium 3.9 mmol/L      Chloride 105 mmol/L      CO2 32.0 (H) mmol/L      Calcium 9.0 mg/dL      eGFR Non African Amer 56 (L) mL/min/1.73      BUN/Creatinine Ratio 17.7     Anion Gap 7.0 mmol/L     Narrative:       The MDRD GFR formula is only valid for adults with stable renal function between ages 18 and 70.            Objective:     Vitals: /57 (BP Location: Right arm, Patient Position: Lying)  Pulse 66  Temp 98.2 °F (36.8 °C) (Oral)   Resp 18  Ht 149.9 cm (59\")  Wt 84 kg (185 lb 3.2 oz)  LMP  (LMP Unknown)  SpO2 97%  BMI 37.41 kg/m2     Intake/Output Summary (Last 24 hours) at 18 1904  Last data filed at 18 1831   Gross per 24 hour   Intake              720 ml   Output             3800 ml   Net            -3080 ml    Temp (24hrs), Av.7 °F (36.5 °C), Min:97.2 °F (36.2 °C), Max:98.2 °F (36.8 °C)        Physical Examination:   General appearance - alert, well appearing, and in no distress and oriented to " person, place, and time  Mental status - alert, oriented to person, place, and time, normal mood, behavior, speech, dress, motor activity, and thought processes  Eyes - pupils equal and reactive, extraocular eye movements intact  Ears - bilateral TM's and external ear canals normal, hearing grossly normal bilaterally  Mouth - mucous membranes moist, pharynx normal without lesions  Neck - supple, no significant adenopathy  Lymphatics - no palpable lymphadenopathy, no hepatosplenomegaly  Chest - DIMINISHING WHEEZE WITH IMPROVING AIR MVT  Heart - normal rate, regular rhythm, normal S1, S2, no murmurs, rubs, clicks or gallops  Abdomen - soft, nontender, nondistended, no masses or organomegaly bowel sounds normal  Neurological - alert, oriented, normal speech, no focal findings or movement disorder noted  Musculoskeletal - no joint tenderness, deformity or swelling  Extremities - DIMINISHING ERYTHEMA LLE    Assessment and Plan:     Primary Problem:  ACUTE EXAC OF ASTHMA  LLE CELLULITIS  Hospital Problem list:  Active Problems:    Asthma attack      PMH:  Past Medical History:   Diagnosis Date   • Asthma    • Cellulitis    • Diabetes mellitus    • Disease of thyroid gland    • Hypercholesteremia    • Hypertension        Treatment Plan:    ACUTE ASTHMA EXACERBATION  CELLULITIS LLE      PLAN:    WEAN STEROID  CONT NEBS  CONT ABX  DIC TO NH IN AM  Discharge planning:   Nursing Home    Reviewed treatment plans with the patient and/or family.   30 minutes spent in face to face interaction and coordination of care.     Electronically signed by Ken Brady MD on 2/8/2018 at 7:04 PM

## 2018-02-09 NOTE — PROGRESS NOTES
Continued Stay Note   [place]     Patient Name: Carlotta Ortiz  MRN: [id number]  Today's Date: 2/9/2018    Admit Date: 2/5/2018          Discharge Plan       02/09/18 1400    Case Management/Social Work Plan    Plan [facility]; SKILLED LEVEL OF CARE    Final Note    Final Note NOTIFIED VENESSA AT [facility] OF PT'S D/C.  PT. WILL BE ADMITTED TO THE SKILLED LEVEL OF CARE AND TRANSPORT VIA FAMILY CAR.                Discharge Codes     None        Expected Discharge Date and Time     Expected Discharge Date Expected Discharge Time    Feb 9, 2018             BRYAN Rich

## 2018-02-09 NOTE — DISCHARGE SUMMARY
Hospital Discharge Summary    Carlotta Ortiz  :  1940  MRN:  [id number]    Admit date:  2018  Discharge date:   2018      Admitting Physician:    Ken Brady MD    Discharge Diagnoses:    Active Problems:    Asthma attack  cellulitis LLE    Hospital Course:   The patient was admitted for the above noted medical/surgical issues. Please see daily progress note for further details concerning their stay. The patient improved throughout their stay and reached maximum medical improvement on the day of discharge. The patient/family agree with the treatment plan as outlined above. All questions concerning their stay were answered prior to discharge. They understand the importance of follow up concerning any abnormal test results.       Discharge Medications:       Carlotta Ortiz   Home Medication Instructions TALAT:[id number]    Printed on:18 0654   Medication Information                      albuterol (PROVENTIL) (2.5 MG/3ML) 0.083% nebulizer solution  Take 3 mL by nebulization Every 6 (Six) Hours As Needed for Wheezing or Shortness of Air.             benazepril (LOTENSIN) 20 MG tablet  Take 20 mg by mouth Daily.             bumetanide (BUMEX) 1 MG tablet  Take 1 mg by mouth 2 (Two) Times a Day.             cefdinir (OMNICEF) 300 MG capsule  Take 1 capsule by mouth 2 (Two) Times a Day for 7 days.             levothyroxine (SYNTHROID, LEVOTHROID) 125 MCG tablet  Take 100 mcg by mouth Daily.             magnesium hydroxide (MILK OF MAGNESIA) 2400 MG/10ML suspension suspension  Take 10 mL by mouth Daily As Needed (constipation).             MethylPREDNISolone (MEDROL, TRISHA,) 4 MG tablet  Take as directed on package instructions.             montelukast (SINGULAIR) 10 MG tablet  Take 1 tablet by mouth Daily.             MULTIPLE VITAMIN PO  Take 1 tablet by mouth Daily.             ondansetron (ZOFRAN) 4 MG tablet  Take 1 tablet by mouth Every 6 (Six) Hours.             potassium chloride  (MICRO-K) 10 MEQ CR capsule  Take 1 capsule by mouth 2 (Two) Times a Day.             pravastatin (PRAVACHOL) 40 MG tablet  Take 1 tablet by mouth Daily.             RaNITidine HCl (ZANTAC PO)  Take 150 mg by mouth Every 12 (Twelve) Hours.             traMADol (ULTRAM) 50 MG tablet  Take 50 mg by mouth 2 (Two) Times a Day As Needed for Moderate Pain .                 Consults:   NONE  Significant Diagnostic Studies:      EKG: normal EKG, normal sinus rhythm.      Treatments:   IV STEROID, NEBS, ABX    Disposition:   HOME  Follow up with Ken Brady MD in 1 weeks.    Signed:  Ken Brady MD  2/9/2018, 6:54 AM

## 2018-02-10 NOTE — THERAPY DISCHARGE NOTE
Acute Care - Occupational Therapy Discharge Summary  [facility]     Patient Name: Carlotta Ortiz  : 1940  MRN: [id number]    Today's Date: 2/10/2018  Onset of Illness/Injury or Date of Surgery Date: 18    Date of Referral to OT: 18  Referring Physician: Dr. Brady      Admit Date: 2018        OT Recommendation and Plan    Visit Dx:    ICD-10-CM ICD-9-CM   1. Impaired functional mobility, balance, gait, and endurance Z74.09 V49.89   2. Impaired mobility and ADLs Z74.09 799.89                     OT Goals       02/10/18 0715 18 1059       Transfer Training OT LTG    Transfer Training OT LTG, Date Established  18  -AC (r) MK (t) AC (c)     Transfer Training OT LTG, Time to Achieve  by discharge  -AC (r) MK (t) AC (c)     Transfer Training OT LTG, Activity Type  toilet;tub  -AC (r) MK (t) AC (c)     Transfer Training OT LTG, [place] Level  supervision required  -AC (r) MK (t) AC (c)     Transfer Training OT LTG, Assist Device  commode, bedside;tub bench;walker, rolling  -AC (r) MK (t) AC (c)     Transfer Training OT LTG, Date Goal Reviewed 02/10/18  -TS      Transfer Training OT LTG, Outcome goal not met  -TS      Transfer Training OT LTG, Reason Goal Not Met discharged from facility  -TS      Patient Education OT LTG    Patient Education OT LTG, Date Established  18  -AC (r) MK (t) AC (c)     Patient Education OT LTG, Time to Achieve  by discharge  -AC (r) MK (t) AC (c)     Patient Education OT LTG, Education Type  home safety;adaptive equipment mgmt;joint protection  -AC (r) MK (t) AC (c)     Patient Education OT LTG, Education Understanding  independent;demonstrates adequately;verbalizes understanding  -AC (r) MK (t) AC (c)     Patient Education OT LTG, Date Goal Reviewed 02/10/18  -TS      Patient Education OT LTG Outcome goal not met  -TS      Patient Education OT LTG, Reason Goal Not Met discharged from facility  -TS      Bathing OT LTG    Bathing Goal OT LTG, Date  Established  02/06/18  -AC (r) MK (t) AC (c)     Bathing Goal OT LTG, Time to Achieve  by discharge  -AC (r) MK (t) AC (c)     Bathing Goal OT LTG, [place] Level  moderate assist (50% patient effort)  -AC (r) MK (t) AC (c)     Bathing Goal OT LTG, Assist Device  sponge, long handled;tub rails;tub bench with back  -AC (r) MK (t) AC (c)     Bathing Goal OT LTG, Date Goal Reviewed 02/10/18  -TS      Bathing Goal OT LTG, Outcome goal not met  -TS      Bathing Goal OT LTG, Reason Goal Not Met discharged from facility  -TS        User Key  (r) = Recorded By, (t) = Taken By, (c) = Cosigned By    Initials Name Provider Type    MICKIE Hung, OTR/L Occupational Therapist    RUDDY Degroot MAY/L Occupational Therapy Assistant    SHASHI Wilder, OT Student OT Student                Outcome Measures       02/08/18 1412 02/08/18 1001 02/07/18 1000    How much help from another person do you currently need...    Turning from your back to your side while in flat bed without using bedrails?  4  -LG 4  -NW    Moving from lying on back to sitting on the side of a flat bed without bedrails?  3  -LG 3  -NW    Moving to and from a bed to a chair (including a wheelchair)?  3  -LG 3  -NW    Standing up from a chair using your arms (e.g., wheelchair, bedside chair)?  3  -LG 3  -NW    Climbing 3-5 steps with a railing?  2  -LG 2  -NW    To walk in hospital room?  3  -LG 3  -NW    AM-PAC 6 Clicks Score  18  -LG 18  -NW    How much help from another is currently needed...    Putting on and taking off regular lower body clothing? 2  -CJ      Bathing (including washing, rinsing, and drying) 2  -CJ      Toileting (which includes using toilet bed pan or urinal) 3  -CJ      Putting on and taking off regular upper body clothing 3  -CJ      Taking care of personal grooming (such as brushing teeth) 3  -CJ      Eating meals 4  -CJ      Score 17  -CJ      Functional Assessment    Outcome Measure Options AM-PAC 6 Clicks Daily Activity  (OT)  -TK AM-PAC 6 Clicks Basic Mobility (PT)  -LG AM-PAC 6 Clicks Basic Mobility (PT)  -NW      User Key  (r) = Recorded By, (t) = Taken By, (c) = Cosigned By    Initials Name Provider Type    NALLELY Kenney, JENS Physical Therapy Assistant    LALO Gill/VADIM Occupational Therapy Assistant    NW Clotilde Hull, JENS Physical Therapy Assistant              OT Discharge Summary  Reason for Discharge: Discharge from facility  Outcomes Achieved: Refer to plan of care for updates on goals achieved  Discharge Destination: [facility]      LALO Garcia/VADIM  2/10/2018

## 2018-02-10 NOTE — PLAN OF CARE
Problem: Inpatient Physical Therapy  Goal: Bed Mobility Goal LTG- PT  Outcome: Unable to achieve outcome(s) by discharge Date Met: 02/10/18   02/06/18 1202 02/06/18 1302 02/10/18 0739   Bed Mobility PT LTG   Bed Mobility PT LTG, Date Established --  02/06/18 --    Bed Mobility PT LTG, Time to Achieve by discharge --  --    Bed Mobility PT LTG, Activity Type all bed mobility --  --    Bed Mobility PT LTG, [place] Level conditional independence --  --    Bed Mobility PT Goal LTG, Assist Device bed rails --  --    Bed Mobility PT LTG, Date Goal Reviewed --  --  02/10/18   Bed Mobility PT LTG, Outcome --  --  goal not met   Bed Mobility PT LTG, Reason Goal Not Met --  --  discharged from facility     Goal: Transfer Training Goal 1 LTG- PT  Outcome: Unable to achieve outcome(s) by discharge Date Met: 02/10/18   02/06/18 1202 02/10/18 0739   Transfer Training PT LTG   Transfer Training PT LTG, Date Established 02/06/18 --    Transfer Training PT LTG, Time to Achieve by discharge --    Transfer Training PT LTG, Activity Type bed to chair /chair to bed;sit to stand/stand to sit --    Transfer Training PT LTG, [place] Level supervision required --    Transfer Training PT LTG, Assist Device walker, rolling platform --    Transfer Training PT LTG, Date Goal Reviewed --  02/10/18   Transfer Training PT LTG, Outcome --  goal not met   Transfer Training PT LTG, Reason Goal Not Met --  discharged from facility     Goal: Gait Training Goal LTG- PT  Outcome: Unable to achieve outcome(s) by discharge Date Met: 02/10/18   02/06/18 1202 02/10/18 0739   Gait Training PT LTG   Gait Training Goal PT LTG, Date Established 02/06/18 --    Gait Training Goal PT LTG, Time to Achieve by discharge --    Gait Training Goal PT LTG, [place] Level supervision required --    Gait Training Goal PT LTG, Assist Device walker, rolling platform --    Gait Training Goal PT LTG, Distance to Achieve 50 feet 1 standing rest break --    Gait  Training Goal PT LTG, Date Goal Reviewed --  02/10/18   Gait Training Goal PT LTG, Outcome --  goal not met   Gait Training Goal PT LTG, Reason Goal Not Met --  discharged from facility

## 2018-02-10 NOTE — PLAN OF CARE
Problem: Inpatient Occupational Therapy  Goal: Transfer Training Goal 1 LTG- OT  Outcome: Unable to achieve outcome(s) by discharge Date Met: 02/10/18   02/06/18 1059 02/10/18 0715   Transfer Training OT LTG   Transfer Training OT LTG, Date Established 02/06/18 --    Transfer Training OT LTG, Time to Achieve by discharge --    Transfer Training OT LTG, Activity Type toilet;tub --    Transfer Training OT LTG, [place] Level supervision required --    Transfer Training OT LTG, Assist Device commode, bedside;tub bench;walker, rolling --    Transfer Training OT LTG, Date Goal Reviewed --  02/10/18   Transfer Training OT LTG, Outcome --  goal not met   Transfer Training OT LTG, Reason Goal Not Met --  discharged from facility     Goal: Patient Education Goal LTG- OT  Outcome: Unable to achieve outcome(s) by discharge Date Met: 02/10/18   02/06/18 1059 02/10/18 0715   Patient Education OT LTG   Patient Education OT LTG, Date Established 02/06/18 --    Patient Education OT LTG, Time to Achieve by discharge --    Patient Education OT LTG, Education Type home safety;adaptive equipment mgmt;joint protection --    Patient Education OT LTG, Education Understanding independent;demonstrates adequately;verbalizes understanding --    Patient Education OT LTG, Date Goal Reviewed --  02/10/18   Patient Education OT LTG Outcome --  goal not met   Patient Education OT LTG, Reason Goal Not Met --  discharged from facility     Goal: Bathing Goal LTG- OT  Outcome: Unable to achieve outcome(s) by discharge Date Met: 02/10/18   02/06/18 1059 02/10/18 0715   Bathing OT LTG   Bathing Goal OT LTG, Date Established 02/06/18 --    Bathing Goal OT LTG, Time to Achieve by discharge --    Bathing Goal OT LTG, [place] Level moderate assist (50% patient effort) --    Bathing Goal OT LTG, Assist Device sponge, long handled;tub rails;tub bench with back --    Bathing Goal OT LTG, Date Goal Reviewed --  02/10/18   Bathing Goal OT LTG, Outcome  --  goal not met   Bathing Goal OT LTG, Reason Goal Not Met --  discharged from facility

## 2018-02-10 NOTE — THERAPY DISCHARGE NOTE
Acute Care - Physical Therapy Discharge Summary  [facility]       Patient Name: Carlotta Ortiz  : 1940  MRN: [id number]    Today's Date: 2/10/2018  Onset of Illness/Injury or Date of Surgery Date: 18    Date of Referral to PT: 18  Referring Physician: Dr. Brady      Admit Date: 2018      PT Recommendation and Plan    Visit Dx:    ICD-10-CM ICD-9-CM   1. Impaired functional mobility, balance, gait, and endurance Z74.09 V49.89   2. Impaired mobility and ADLs Z74.09 799.89             Outcome Measures       18 1412 18 1001 18 1000    How much help from another person do you currently need...    Turning from your back to your side while in flat bed without using bedrails?  4  -LG 4  -NW    Moving from lying on back to sitting on the side of a flat bed without bedrails?  3  -LG 3  -NW    Moving to and from a bed to a chair (including a wheelchair)?  3  -LG 3  -NW    Standing up from a chair using your arms (e.g., wheelchair, bedside chair)?  3  -LG 3  -NW    Climbing 3-5 steps with a railing?  2  -LG 2  -NW    To walk in hospital room?  3  -LG 3  -NW    AM-PAC 6 Clicks Score  18  -LG 18  -NW    How much help from another is currently needed...    Putting on and taking off regular lower body clothing? 2  -CJ      Bathing (including washing, rinsing, and drying) 2  -CJ      Toileting (which includes using toilet bed pan or urinal) 3  -CJ      Putting on and taking off regular upper body clothing 3  -CJ      Taking care of personal grooming (such as brushing teeth) 3  -CJ      Eating meals 4  -CJ      Score 17  -CJ      Functional Assessment    Outcome Measure Options AM-PAC 6 Clicks Daily Activity (OT)  -CJ AM-PAC 6 Clicks Basic Mobility (PT)  -LG AM-PAC 6 Clicks Basic Mobility (PT)  -NW      User Key  (r) = Recorded By, (t) = Taken By, (c) = Cosigned By    Initials Name Provider Type    LG Saleem Kenney PTA Physical Therapy Assistant    CJ MCKENZIE Garrett  Therapy Assistant    NICHOLAS Hull, [facility] Physical Therapy Assistant                      IP PT Goals       02/10/18 0739 02/06/18 1302 02/06/18 1202    Bed Mobility PT LTG    Bed Mobility PT LTG, Date Established  02/06/18  -PB (r) JM (t) PB (c)     Bed Mobility PT LTG, Time to Achieve   by discharge  -PB (r) JM (t) PB (c)    Bed Mobility PT LTG, Activity Type   all bed mobility  -PB (r) JM (t) PB (c)    Bed Mobility PT LTG, [place] Level   conditional independence  -PB (r) JM (t) PB (c)    Bed Mobility PT Goal  LTG, Assist Device   bed rails  -PB (r) JM (t) PB (c)    Bed Mobility PT LTG, Date Goal Reviewed 02/10/18  -      Bed Mobility PT LTG, Outcome goal not met  -      Bed Mobility PT LTG, Reason Goal Not Met discharged from facility  -      Transfer Training PT LTG    Transfer Training PT LTG, Date Established   02/06/18  -PB (r) JM (t) PB (c)    Transfer Training PT LTG, Time to Achieve   by discharge  -PB (r) JM (t) PB (c)    Transfer Training PT LTG, Activity Type   bed to chair /chair to bed;sit to stand/stand to sit  -PB (r) JM (t) PB (c)    Transfer Training PT LTG, [place] Level   supervision required  -PB (r) JM (t) PB (c)    Transfer Training PT LTG, Assist Device   walker, rolling platform  -PB (r) JM (t) PB (c)    Transfer Training PT  LTG, Date Goal Reviewed 02/10/18  -      Transfer Training PT LTG, Outcome goal not met  -      Transfer Training PT LTG, Reason Goal Not Met discharged from facility  -      Gait Training PT LTG    Gait Training Goal PT LTG, Date Established   02/06/18  -PB (r) JM (t) PB (c)    Gait Training Goal PT LTG, Time to Achieve   by discharge  -PB (r) JM (t) PB (c)    Gait Training Goal PT LTG, [place] Level   supervision required  -PB (r) JM (t) PB (c)    Gait Training Goal PT LTG, Assist Device   walker, rolling platform  -PB (r) JM (t) PB (c)    Gait Training Goal PT LTG, Distance to Achieve   50 feet 1 standing rest break  -PB (r) JM (t) PB  (c)    Gait Training Goal PT LTG, Date Goal Reviewed 02/10/18  -      Gait Training Goal PT LTG, Outcome goal not met  -      Gait Training Goal PT LTG, Reason Goal Not Met discharged from facility  -        User Key  (r) = Recorded By, (t) = Taken By, (c) = Cosigned By    Initials Name Provider Type    YARON Del Valle, PTA Physical Therapy Assistant    JESSICA Ponce, PT DPT Physical Therapist    ELENI Gan, PT Student PT Student              PT Discharge Summary  Anticipated Discharge Disposition: skilled nursing facility  Reason for Discharge: Discharge from facility  Outcomes Achieved: Unable to make functional progress toward goals at this time  Discharge Destination: SNF      Yareli Del Valle PTA   2/10/2018

## 2018-02-20 ENCOUNTER — LAB REQUISITION (OUTPATIENT)
Dept: LAB | Facility: HOSPITAL | Age: 78
End: 2018-02-20

## 2018-02-20 DIAGNOSIS — Z00.00 ENCOUNTER FOR GENERAL ADULT MEDICAL EXAMINATION WITHOUT ABNORMAL FINDINGS: ICD-10-CM

## 2018-02-20 LAB
ALBUMIN SERPL-MCNC: 2.8 G/DL (ref 3.5–5)
ALP SERPL-CCNC: 75 U/L (ref 24–120)
ALT SERPL W P-5'-P-CCNC: 24 U/L (ref 0–54)
ANION GAP SERPL CALCULATED.3IONS-SCNC: 8 MMOL/L (ref 4–13)
ARTICHOKE IGE QN: 65 MG/DL (ref 0–99)
AST SERPL-CCNC: 17 U/L (ref 7–45)
BILIRUB CONJ SERPL-MCNC: 0 MG/DL (ref 0–0.3)
BILIRUB INDIRECT SERPL-MCNC: 0.2 MG/DL (ref 0–1.1)
BILIRUB SERPL-MCNC: 0.4 MG/DL (ref 0.1–1)
BUN BLD-MCNC: 18 MG/DL (ref 5–21)
BUN/CREAT SERPL: 16.8 (ref 7–25)
CALCIUM SPEC-SCNC: 8.9 MG/DL (ref 8.4–10.4)
CHLORIDE SERPL-SCNC: 103 MMOL/L (ref 98–110)
CHOLEST SERPL-MCNC: 125 MG/DL (ref 130–200)
CO2 SERPL-SCNC: 30 MMOL/L (ref 24–31)
CREAT BLD-MCNC: 1.07 MG/DL (ref 0.5–1.4)
DEPRECATED RDW RBC AUTO: 48.6 FL (ref 40–54)
ERYTHROCYTE [DISTWIDTH] IN BLOOD BY AUTOMATED COUNT: 13.8 % (ref 12–15)
GFR SERPL CREATININE-BSD FRML MDRD: 50 ML/MIN/1.73
GLUCOSE BLD-MCNC: 74 MG/DL (ref 70–100)
HBA1C MFR BLD: 5.3 %
HCT VFR BLD AUTO: 35.2 % (ref 37–47)
HDLC SERPL-MCNC: 52 MG/DL
HGB BLD-MCNC: 11.7 G/DL (ref 12–16)
LDLC/HDLC SERPL: 1.07 {RATIO}
MCH RBC QN AUTO: 31.7 PG (ref 28–32)
MCHC RBC AUTO-ENTMCNC: 33.2 G/DL (ref 33–36)
MCV RBC AUTO: 95.4 FL (ref 82–98)
PLATELET # BLD AUTO: 184 10*3/MM3 (ref 130–400)
PMV BLD AUTO: 11.2 FL (ref 6–12)
POTASSIUM BLD-SCNC: 3.6 MMOL/L (ref 3.5–5.3)
PREALB SERPL-MCNC: 21.6 MG/DL (ref 18–36)
PROT SERPL-MCNC: 4.9 G/DL (ref 6.3–8.7)
RBC # BLD AUTO: 3.69 10*6/MM3 (ref 4.2–5.4)
SODIUM BLD-SCNC: 141 MMOL/L (ref 135–145)
TRIGL SERPL-MCNC: 86 MG/DL (ref 0–149)
TSH SERPL DL<=0.05 MIU/L-ACNC: 3.12 MIU/ML (ref 0.47–4.68)
VIT B12 BLD-MCNC: 358 PG/ML (ref 239–931)
WBC NRBC COR # BLD: 5.63 10*3/MM3 (ref 4.8–10.8)

## 2018-02-20 PROCEDURE — 36415 COLL VENOUS BLD VENIPUNCTURE: CPT | Performed by: NURSE PRACTITIONER

## 2018-02-20 PROCEDURE — 85027 COMPLETE CBC AUTOMATED: CPT | Performed by: NURSE PRACTITIONER

## 2018-02-20 PROCEDURE — 84134 ASSAY OF PREALBUMIN: CPT | Performed by: NURSE PRACTITIONER

## 2018-02-20 PROCEDURE — 83036 HEMOGLOBIN GLYCOSYLATED A1C: CPT | Performed by: NURSE PRACTITIONER

## 2018-02-20 PROCEDURE — 82607 VITAMIN B-12: CPT | Performed by: NURSE PRACTITIONER

## 2018-02-20 PROCEDURE — 80076 HEPATIC FUNCTION PANEL: CPT | Performed by: NURSE PRACTITIONER

## 2018-02-20 PROCEDURE — 84443 ASSAY THYROID STIM HORMONE: CPT | Performed by: NURSE PRACTITIONER

## 2018-02-20 PROCEDURE — 80061 LIPID PANEL: CPT | Performed by: NURSE PRACTITIONER

## 2018-02-20 PROCEDURE — 80048 BASIC METABOLIC PNL TOTAL CA: CPT | Performed by: NURSE PRACTITIONER

## 2018-02-28 ENCOUNTER — LAB REQUISITION (OUTPATIENT)
Dept: LAB | Facility: HOSPITAL | Age: 78
End: 2018-02-28

## 2018-02-28 DIAGNOSIS — Z00.00 ENCOUNTER FOR GENERAL ADULT MEDICAL EXAMINATION WITHOUT ABNORMAL FINDINGS: ICD-10-CM

## 2018-02-28 LAB
ANION GAP SERPL CALCULATED.3IONS-SCNC: 9 MMOL/L (ref 4–13)
BUN BLD-MCNC: 17 MG/DL (ref 5–21)
BUN/CREAT SERPL: 18.1 (ref 7–25)
CALCIUM SPEC-SCNC: 8.8 MG/DL (ref 8.4–10.4)
CHLORIDE SERPL-SCNC: 104 MMOL/L (ref 98–110)
CO2 SERPL-SCNC: 30 MMOL/L (ref 24–31)
CREAT BLD-MCNC: 0.94 MG/DL (ref 0.5–1.4)
DEPRECATED RDW RBC AUTO: 49.4 FL (ref 40–54)
ERYTHROCYTE [DISTWIDTH] IN BLOOD BY AUTOMATED COUNT: 13.8 % (ref 12–15)
GFR SERPL CREATININE-BSD FRML MDRD: 58 ML/MIN/1.73
GLUCOSE BLD-MCNC: 66 MG/DL (ref 70–100)
HCT VFR BLD AUTO: 34.4 % (ref 37–47)
HGB BLD-MCNC: 11.2 G/DL (ref 12–16)
MCH RBC QN AUTO: 31.4 PG (ref 28–32)
MCHC RBC AUTO-ENTMCNC: 32.6 G/DL (ref 33–36)
MCV RBC AUTO: 96.4 FL (ref 82–98)
PLATELET # BLD AUTO: 175 10*3/MM3 (ref 130–400)
PMV BLD AUTO: 10.8 FL (ref 6–12)
POTASSIUM BLD-SCNC: 3.5 MMOL/L (ref 3.5–5.3)
RBC # BLD AUTO: 3.57 10*6/MM3 (ref 4.2–5.4)
SODIUM BLD-SCNC: 143 MMOL/L (ref 135–145)
WBC NRBC COR # BLD: 4.7 10*3/MM3 (ref 4.8–10.8)

## 2018-02-28 PROCEDURE — 85027 COMPLETE CBC AUTOMATED: CPT | Performed by: NURSE PRACTITIONER

## 2018-02-28 PROCEDURE — 36415 COLL VENOUS BLD VENIPUNCTURE: CPT | Performed by: NURSE PRACTITIONER

## 2018-02-28 PROCEDURE — 80048 BASIC METABOLIC PNL TOTAL CA: CPT | Performed by: NURSE PRACTITIONER

## 2018-03-10 ENCOUNTER — LAB REQUISITION (OUTPATIENT)
Dept: LAB | Facility: HOSPITAL | Age: 78
End: 2018-03-10

## 2018-03-10 DIAGNOSIS — Z00.00 ENCOUNTER FOR GENERAL ADULT MEDICAL EXAMINATION WITHOUT ABNORMAL FINDINGS: ICD-10-CM

## 2018-03-10 LAB
FLUAV AG NPH QL: NEGATIVE
FLUBV AG NPH QL IA: NEGATIVE

## 2018-03-10 PROCEDURE — 87804 INFLUENZA ASSAY W/OPTIC: CPT | Performed by: NURSE PRACTITIONER

## 2018-03-14 ENCOUNTER — LAB REQUISITION (OUTPATIENT)
Dept: LAB | Facility: HOSPITAL | Age: 78
End: 2018-03-14

## 2018-03-14 DIAGNOSIS — Z00.00 ENCOUNTER FOR GENERAL ADULT MEDICAL EXAMINATION WITHOUT ABNORMAL FINDINGS: ICD-10-CM

## 2018-03-14 LAB
ANION GAP SERPL CALCULATED.3IONS-SCNC: 7 MMOL/L (ref 4–13)
BUN BLD-MCNC: 24 MG/DL (ref 5–21)
BUN/CREAT SERPL: 25.5 (ref 7–25)
CALCIUM SPEC-SCNC: 8.8 MG/DL (ref 8.4–10.4)
CHLORIDE SERPL-SCNC: 103 MMOL/L (ref 98–110)
CO2 SERPL-SCNC: 30 MMOL/L (ref 24–31)
CREAT BLD-MCNC: 0.94 MG/DL (ref 0.5–1.4)
DEPRECATED RDW RBC AUTO: 49.1 FL (ref 40–54)
ERYTHROCYTE [DISTWIDTH] IN BLOOD BY AUTOMATED COUNT: 13.6 % (ref 12–15)
GFR SERPL CREATININE-BSD FRML MDRD: 58 ML/MIN/1.73
GLUCOSE BLD-MCNC: 70 MG/DL (ref 70–100)
HCT VFR BLD AUTO: 34.9 % (ref 37–47)
HGB BLD-MCNC: 11.3 G/DL (ref 12–16)
MCH RBC QN AUTO: 31.7 PG (ref 28–32)
MCHC RBC AUTO-ENTMCNC: 32.4 G/DL (ref 33–36)
MCV RBC AUTO: 97.8 FL (ref 82–98)
PLATELET # BLD AUTO: 227 10*3/MM3 (ref 130–400)
PMV BLD AUTO: 11.1 FL (ref 6–12)
POTASSIUM BLD-SCNC: 4 MMOL/L (ref 3.5–5.3)
RBC # BLD AUTO: 3.57 10*6/MM3 (ref 4.2–5.4)
SODIUM BLD-SCNC: 140 MMOL/L (ref 135–145)
WBC NRBC COR # BLD: 4.95 10*3/MM3 (ref 4.8–10.8)

## 2018-03-14 PROCEDURE — 85027 COMPLETE CBC AUTOMATED: CPT | Performed by: NURSE PRACTITIONER

## 2018-03-14 PROCEDURE — 80048 BASIC METABOLIC PNL TOTAL CA: CPT | Performed by: NURSE PRACTITIONER

## 2018-03-14 PROCEDURE — 36415 COLL VENOUS BLD VENIPUNCTURE: CPT | Performed by: NURSE PRACTITIONER

## 2018-03-19 ENCOUNTER — LAB REQUISITION (OUTPATIENT)
Dept: LAB | Facility: HOSPITAL | Age: 78
End: 2018-03-19

## 2018-03-19 DIAGNOSIS — Z00.00 ENCOUNTER FOR GENERAL ADULT MEDICAL EXAMINATION WITHOUT ABNORMAL FINDINGS: ICD-10-CM

## 2018-03-19 LAB
ANION GAP SERPL CALCULATED.3IONS-SCNC: 9 MMOL/L (ref 4–13)
BUN BLD-MCNC: 19 MG/DL (ref 5–21)
BUN/CREAT SERPL: 19.6 (ref 7–25)
CALCIUM SPEC-SCNC: 9.2 MG/DL (ref 8.4–10.4)
CHLORIDE SERPL-SCNC: 103 MMOL/L (ref 98–110)
CO2 SERPL-SCNC: 32 MMOL/L (ref 24–31)
CREAT BLD-MCNC: 0.97 MG/DL (ref 0.5–1.4)
GFR SERPL CREATININE-BSD FRML MDRD: 56 ML/MIN/1.73
GLUCOSE BLD-MCNC: 78 MG/DL (ref 70–100)
POTASSIUM BLD-SCNC: 4.2 MMOL/L (ref 3.5–5.3)
SODIUM BLD-SCNC: 144 MMOL/L (ref 135–145)

## 2018-03-19 PROCEDURE — 80048 BASIC METABOLIC PNL TOTAL CA: CPT | Performed by: NURSE PRACTITIONER

## 2018-03-19 PROCEDURE — 36415 COLL VENOUS BLD VENIPUNCTURE: CPT | Performed by: NURSE PRACTITIONER

## 2018-03-24 ENCOUNTER — APPOINTMENT (OUTPATIENT)
Dept: CT IMAGING | Facility: HOSPITAL | Age: 78
End: 2018-03-24

## 2018-03-24 ENCOUNTER — APPOINTMENT (OUTPATIENT)
Dept: GENERAL RADIOLOGY | Facility: HOSPITAL | Age: 78
End: 2018-03-24

## 2018-03-24 ENCOUNTER — HOSPITAL ENCOUNTER (OUTPATIENT)
Facility: HOSPITAL | Age: 78
Setting detail: OBSERVATION
Discharge: NURSING, REHAB OR LONG-TERM CARE | End: 2018-03-26
Attending: EMERGENCY MEDICINE | Admitting: FAMILY MEDICINE

## 2018-03-24 DIAGNOSIS — R07.9 CHEST PAIN, UNSPECIFIED TYPE: Primary | ICD-10-CM

## 2018-03-24 LAB
ALBUMIN SERPL-MCNC: 3.3 G/DL (ref 3.5–5)
ALBUMIN/GLOB SERPL: 1.4 G/DL (ref 1.1–2.5)
ALP SERPL-CCNC: 74 U/L (ref 24–120)
ALT SERPL W P-5'-P-CCNC: 23 U/L (ref 0–54)
ANION GAP SERPL CALCULATED.3IONS-SCNC: 7 MMOL/L (ref 4–13)
ARTERIAL PATENCY WRIST A: POSITIVE
AST SERPL-CCNC: 24 U/L (ref 7–45)
ATMOSPHERIC PRESS: 746 MMHG
BASE EXCESS BLDA CALC-SCNC: 6.6 MMOL/L (ref 0–2)
BASOPHILS # BLD AUTO: 0.03 10*3/MM3 (ref 0–0.2)
BASOPHILS NFR BLD AUTO: 0.7 % (ref 0–2)
BDY SITE: ABNORMAL
BILIRUB SERPL-MCNC: 0.4 MG/DL (ref 0.1–1)
BODY TEMPERATURE: 37 C
BUN BLD-MCNC: 18 MG/DL (ref 5–21)
BUN/CREAT SERPL: 19.1 (ref 7–25)
CALCIUM SPEC-SCNC: 9.2 MG/DL (ref 8.4–10.4)
CHLORIDE SERPL-SCNC: 103 MMOL/L (ref 98–110)
CO2 SERPL-SCNC: 33 MMOL/L (ref 24–31)
CREAT BLD-MCNC: 0.94 MG/DL (ref 0.5–1.4)
D DIMER PPP FEU-MCNC: 1.15 MG/L (FEU) (ref 0–0.5)
DEPRECATED RDW RBC AUTO: 47.1 FL (ref 40–54)
EOSINOPHIL # BLD AUTO: 0.09 10*3/MM3 (ref 0–0.7)
EOSINOPHIL NFR BLD AUTO: 2.2 % (ref 0–4)
ERYTHROCYTE [DISTWIDTH] IN BLOOD BY AUTOMATED COUNT: 13.4 % (ref 12–15)
GFR SERPL CREATININE-BSD FRML MDRD: 58 ML/MIN/1.73
GLOBULIN UR ELPH-MCNC: 2.3 GM/DL
GLUCOSE BLD-MCNC: 99 MG/DL (ref 70–100)
HCO3 BLDA-SCNC: 30.3 MMOL/L (ref 20–26)
HCT VFR BLD AUTO: 36.5 % (ref 37–47)
HGB BLD-MCNC: 11.9 G/DL (ref 12–16)
IMM GRANULOCYTES # BLD: 0 10*3/MM3 (ref 0–0.03)
IMM GRANULOCYTES NFR BLD: 0 % (ref 0–5)
LYMPHOCYTES # BLD AUTO: 1.24 10*3/MM3 (ref 0.72–4.86)
LYMPHOCYTES NFR BLD AUTO: 30 % (ref 15–45)
Lab: ABNORMAL
MCH RBC QN AUTO: 31.1 PG (ref 28–32)
MCHC RBC AUTO-ENTMCNC: 32.6 G/DL (ref 33–36)
MCV RBC AUTO: 95.3 FL (ref 82–98)
MODALITY: ABNORMAL
MONOCYTES # BLD AUTO: 0.34 10*3/MM3 (ref 0.19–1.3)
MONOCYTES NFR BLD AUTO: 8.2 % (ref 4–12)
NEUTROPHILS # BLD AUTO: 2.44 10*3/MM3 (ref 1.87–8.4)
NEUTROPHILS NFR BLD AUTO: 58.9 % (ref 39–78)
NRBC BLD MANUAL-RTO: 0 /100 WBC (ref 0–0)
NT-PROBNP SERPL-MCNC: 120 PG/ML (ref 0–1800)
PCO2 BLDA: 39 MM HG (ref 35–45)
PH BLDA: 7.5 PH UNITS (ref 7.35–7.45)
PLATELET # BLD AUTO: 197 10*3/MM3 (ref 130–400)
PMV BLD AUTO: 10.3 FL (ref 6–12)
PO2 BLDA: 80.4 MM HG (ref 83–108)
POTASSIUM BLD-SCNC: 3.8 MMOL/L (ref 3.5–5.3)
PROT SERPL-MCNC: 5.6 G/DL (ref 6.3–8.7)
RBC # BLD AUTO: 3.83 10*6/MM3 (ref 4.2–5.4)
SAO2 % BLDCOA: 97.6 % (ref 94–99)
SODIUM BLD-SCNC: 143 MMOL/L (ref 135–145)
TROPONIN I SERPL-MCNC: <0.012 NG/ML (ref 0–0.03)
VENTILATOR MODE: ABNORMAL
WBC NRBC COR # BLD: 4.14 10*3/MM3 (ref 4.8–10.8)

## 2018-03-24 PROCEDURE — 71045 X-RAY EXAM CHEST 1 VIEW: CPT

## 2018-03-24 PROCEDURE — 85025 COMPLETE CBC W/AUTO DIFF WBC: CPT | Performed by: EMERGENCY MEDICINE

## 2018-03-24 PROCEDURE — G0378 HOSPITAL OBSERVATION PER HR: HCPCS

## 2018-03-24 PROCEDURE — 71275 CT ANGIOGRAPHY CHEST: CPT

## 2018-03-24 PROCEDURE — 84484 ASSAY OF TROPONIN QUANT: CPT | Performed by: FAMILY MEDICINE

## 2018-03-24 PROCEDURE — 93005 ELECTROCARDIOGRAM TRACING: CPT | Performed by: EMERGENCY MEDICINE

## 2018-03-24 PROCEDURE — 93010 ELECTROCARDIOGRAM REPORT: CPT | Performed by: INTERNAL MEDICINE

## 2018-03-24 PROCEDURE — 85379 FIBRIN DEGRADATION QUANT: CPT | Performed by: EMERGENCY MEDICINE

## 2018-03-24 PROCEDURE — 84484 ASSAY OF TROPONIN QUANT: CPT | Performed by: EMERGENCY MEDICINE

## 2018-03-24 PROCEDURE — 83880 ASSAY OF NATRIURETIC PEPTIDE: CPT | Performed by: EMERGENCY MEDICINE

## 2018-03-24 PROCEDURE — 36600 WITHDRAWAL OF ARTERIAL BLOOD: CPT

## 2018-03-24 PROCEDURE — 80053 COMPREHEN METABOLIC PANEL: CPT | Performed by: EMERGENCY MEDICINE

## 2018-03-24 PROCEDURE — 0 IOPAMIDOL PER 1 ML: Performed by: EMERGENCY MEDICINE

## 2018-03-24 PROCEDURE — 99285 EMERGENCY DEPT VISIT HI MDM: CPT

## 2018-03-24 PROCEDURE — 82803 BLOOD GASES ANY COMBINATION: CPT

## 2018-03-24 RX ORDER — LEVOTHYROXINE SODIUM 0.12 MG/1
125 TABLET ORAL DAILY
Status: DISCONTINUED | OUTPATIENT
Start: 2018-03-25 | End: 2018-03-26 | Stop reason: HOSPADM

## 2018-03-24 RX ORDER — DOCUSATE SODIUM 100 MG/1
100 CAPSULE, LIQUID FILLED ORAL DAILY PRN
COMMUNITY
End: 2019-09-10

## 2018-03-24 RX ORDER — BUMETANIDE 1 MG/1
1 TABLET ORAL 2 TIMES DAILY
Status: DISCONTINUED | OUTPATIENT
Start: 2018-03-25 | End: 2018-03-26 | Stop reason: HOSPADM

## 2018-03-24 RX ORDER — FLUTICASONE PROPIONATE 50 MCG
2 SPRAY, SUSPENSION (ML) NASAL DAILY
Status: DISCONTINUED | OUTPATIENT
Start: 2018-03-25 | End: 2018-03-26 | Stop reason: HOSPADM

## 2018-03-24 RX ORDER — CETIRIZINE HYDROCHLORIDE 10 MG/1
10 TABLET ORAL DAILY
COMMUNITY

## 2018-03-24 RX ORDER — FLUTICASONE PROPIONATE 50 MCG
2 SPRAY, SUSPENSION (ML) NASAL DAILY
COMMUNITY
End: 2019-09-10

## 2018-03-24 RX ORDER — POLYETHYLENE GLYCOL 3350 17 G/17G
17 POWDER, FOR SOLUTION ORAL DAILY
Status: DISCONTINUED | OUTPATIENT
Start: 2018-03-25 | End: 2018-03-26 | Stop reason: HOSPADM

## 2018-03-24 RX ORDER — DOCUSATE SODIUM 100 MG/1
100 CAPSULE, LIQUID FILLED ORAL DAILY PRN
Status: DISCONTINUED | OUTPATIENT
Start: 2018-03-24 | End: 2018-03-26 | Stop reason: HOSPADM

## 2018-03-24 RX ORDER — ALBUTEROL SULFATE 2.5 MG/3ML
2.5 SOLUTION RESPIRATORY (INHALATION) EVERY 6 HOURS PRN
Status: DISCONTINUED | OUTPATIENT
Start: 2018-03-24 | End: 2018-03-26 | Stop reason: HOSPADM

## 2018-03-24 RX ORDER — FAMOTIDINE 20 MG/1
20 TABLET, FILM COATED ORAL DAILY
Status: DISCONTINUED | OUTPATIENT
Start: 2018-03-25 | End: 2018-03-26 | Stop reason: HOSPADM

## 2018-03-24 RX ORDER — ATORVASTATIN CALCIUM 10 MG/1
10 TABLET, FILM COATED ORAL DAILY
Status: DISCONTINUED | OUTPATIENT
Start: 2018-03-25 | End: 2018-03-26 | Stop reason: HOSPADM

## 2018-03-24 RX ORDER — CETIRIZINE HYDROCHLORIDE 5 MG/1
5 TABLET ORAL DAILY
Status: DISCONTINUED | OUTPATIENT
Start: 2018-03-25 | End: 2018-03-26 | Stop reason: HOSPADM

## 2018-03-24 RX ORDER — SPIRONOLACTONE 50 MG/1
50 TABLET, FILM COATED ORAL DAILY
COMMUNITY

## 2018-03-24 RX ORDER — ALBUTEROL SULFATE 90 UG/1
2 AEROSOL, METERED RESPIRATORY (INHALATION) EVERY 6 HOURS PRN
COMMUNITY

## 2018-03-24 RX ORDER — SPIRONOLACTONE 25 MG/1
25 TABLET ORAL DAILY
Status: DISCONTINUED | OUTPATIENT
Start: 2018-03-25 | End: 2018-03-26 | Stop reason: HOSPADM

## 2018-03-24 RX ORDER — POLYETHYLENE GLYCOL 3350 17 G/17G
17 POWDER, FOR SOLUTION ORAL DAILY
COMMUNITY

## 2018-03-24 RX ADMIN — IOPAMIDOL 200 ML: 755 INJECTION, SOLUTION INTRAVENOUS at 13:35

## 2018-03-25 ENCOUNTER — APPOINTMENT (OUTPATIENT)
Dept: CARDIOLOGY | Facility: HOSPITAL | Age: 78
End: 2018-03-25

## 2018-03-25 LAB
ANION GAP SERPL CALCULATED.3IONS-SCNC: 7 MMOL/L (ref 4–13)
BH CV ECHO MEAS - AO MAX PG (FULL): 6.7 MMHG
BH CV ECHO MEAS - AO MAX PG: 13.7 MMHG
BH CV ECHO MEAS - AO MEAN PG (FULL): 2 MMHG
BH CV ECHO MEAS - AO MEAN PG: 6 MMHG
BH CV ECHO MEAS - AO ROOT AREA (BSA CORRECTED): 1.9
BH CV ECHO MEAS - AO ROOT AREA: 9.6 CM^2
BH CV ECHO MEAS - AO ROOT DIAM: 3.5 CM
BH CV ECHO MEAS - AO V2 MAX: 185 CM/SEC
BH CV ECHO MEAS - AO V2 MEAN: 104 CM/SEC
BH CV ECHO MEAS - AO V2 VTI: 37.6 CM
BH CV ECHO MEAS - AVA(I,A): 2.6 CM^2
BH CV ECHO MEAS - AVA(I,D): 2.6 CM^2
BH CV ECHO MEAS - AVA(V,A): 2.2 CM^2
BH CV ECHO MEAS - AVA(V,D): 2.2 CM^2
BH CV ECHO MEAS - BSA(HAYCOCK): 1.9 M^2
BH CV ECHO MEAS - BSA: 1.8 M^2
BH CV ECHO MEAS - BZI_BMI: 38 KILOGRAMS/M^2
BH CV ECHO MEAS - BZI_METRIC_HEIGHT: 149.9 CM
BH CV ECHO MEAS - BZI_METRIC_WEIGHT: 85.3 KG
BH CV ECHO MEAS - CONTRAST EF 4CH: 64.9 ML/M^2
BH CV ECHO MEAS - EDV(CUBED): 91.1 ML
BH CV ECHO MEAS - EDV(MOD-SP4): 85.1 ML
BH CV ECHO MEAS - EDV(TEICH): 92.4 ML
BH CV ECHO MEAS - EF(CUBED): 75.9 %
BH CV ECHO MEAS - EF(MOD-SP4): 64.9 %
BH CV ECHO MEAS - EF(TEICH): 68 %
BH CV ECHO MEAS - ESV(CUBED): 22 ML
BH CV ECHO MEAS - ESV(MOD-SP4): 29.9 ML
BH CV ECHO MEAS - ESV(TEICH): 29.6 ML
BH CV ECHO MEAS - FS: 37.8 %
BH CV ECHO MEAS - IVS/LVPW: 1
BH CV ECHO MEAS - IVSD: 1.4 CM
BH CV ECHO MEAS - LA DIMENSION: 4.6 CM
BH CV ECHO MEAS - LA/AO: 1.3
BH CV ECHO MEAS - LV DIASTOLIC VOL/BSA (35-75): 47.4 ML/M^2
BH CV ECHO MEAS - LV MASS(C)D: 248.4 GRAMS
BH CV ECHO MEAS - LV MASS(C)DI: 138.3 GRAMS/M^2
BH CV ECHO MEAS - LV MAX PG: 7 MMHG
BH CV ECHO MEAS - LV MEAN PG: 4 MMHG
BH CV ECHO MEAS - LV SYSTOLIC VOL/BSA (12-30): 16.6 ML/M^2
BH CV ECHO MEAS - LV V1 MAX: 132 CM/SEC
BH CV ECHO MEAS - LV V1 MEAN: 89 CM/SEC
BH CV ECHO MEAS - LV V1 VTI: 30.7 CM
BH CV ECHO MEAS - LVIDD: 4.5 CM
BH CV ECHO MEAS - LVIDS: 2.8 CM
BH CV ECHO MEAS - LVLD AP4: 7.2 CM
BH CV ECHO MEAS - LVLS AP4: 5.8 CM
BH CV ECHO MEAS - LVOT AREA (M): 3.1 CM^2
BH CV ECHO MEAS - LVOT AREA: 3.1 CM^2
BH CV ECHO MEAS - LVOT DIAM: 2 CM
BH CV ECHO MEAS - LVPWD: 1.4 CM
BH CV ECHO MEAS - MV A MAX VEL: 102 CM/SEC
BH CV ECHO MEAS - MV DEC TIME: 0.24 SEC
BH CV ECHO MEAS - MV E MAX VEL: 74.7 CM/SEC
BH CV ECHO MEAS - MV E/A: 0.73
BH CV ECHO MEAS - SI(AO): 201.4 ML/M^2
BH CV ECHO MEAS - SI(CUBED): 38.5 ML/M^2
BH CV ECHO MEAS - SI(LVOT): 53.7 ML/M^2
BH CV ECHO MEAS - SI(MOD-SP4): 30.7 ML/M^2
BH CV ECHO MEAS - SI(TEICH): 35 ML/M^2
BH CV ECHO MEAS - SV(AO): 361.8 ML
BH CV ECHO MEAS - SV(CUBED): 69.2 ML
BH CV ECHO MEAS - SV(LVOT): 96.4 ML
BH CV ECHO MEAS - SV(MOD-SP4): 55.2 ML
BH CV ECHO MEAS - SV(TEICH): 62.9 ML
BUN BLD-MCNC: 18 MG/DL (ref 5–21)
BUN/CREAT SERPL: 18.4 (ref 7–25)
CALCIUM SPEC-SCNC: 9.2 MG/DL (ref 8.4–10.4)
CHLORIDE SERPL-SCNC: 106 MMOL/L (ref 98–110)
CO2 SERPL-SCNC: 31 MMOL/L (ref 24–31)
CREAT BLD-MCNC: 0.98 MG/DL (ref 0.5–1.4)
E/E' RATIO: 16
GFR SERPL CREATININE-BSD FRML MDRD: 55 ML/MIN/1.73
GLUCOSE BLD-MCNC: 86 MG/DL (ref 70–100)
GLUCOSE BLDC GLUCOMTR-MCNC: 120 MG/DL (ref 70–130)
GLUCOSE BLDC GLUCOMTR-MCNC: 134 MG/DL (ref 70–130)
LEFT ATRIUM VOLUME INDEX: 32.3 ML/M2
LEFT ATRIUM VOLUME: 51 CM3
LV EF 2D ECHO EST: 65 %
MAXIMAL PREDICTED HEART RATE: 142 BPM
POTASSIUM BLD-SCNC: 3.4 MMOL/L (ref 3.5–5.3)
SODIUM BLD-SCNC: 144 MMOL/L (ref 135–145)
STRESS TARGET HR: 121 BPM
TROPONIN I SERPL-MCNC: <0.012 NG/ML (ref 0–0.03)
TROPONIN I SERPL-MCNC: <0.012 NG/ML (ref 0–0.03)

## 2018-03-25 PROCEDURE — 80048 BASIC METABOLIC PNL TOTAL CA: CPT | Performed by: FAMILY MEDICINE

## 2018-03-25 PROCEDURE — 99214 OFFICE O/P EST MOD 30 MIN: CPT | Performed by: INTERNAL MEDICINE

## 2018-03-25 PROCEDURE — G0378 HOSPITAL OBSERVATION PER HR: HCPCS

## 2018-03-25 PROCEDURE — 93306 TTE W/DOPPLER COMPLETE: CPT

## 2018-03-25 PROCEDURE — 94799 UNLISTED PULMONARY SVC/PX: CPT

## 2018-03-25 PROCEDURE — 84484 ASSAY OF TROPONIN QUANT: CPT | Performed by: FAMILY MEDICINE

## 2018-03-25 PROCEDURE — 82962 GLUCOSE BLOOD TEST: CPT

## 2018-03-25 PROCEDURE — 93306 TTE W/DOPPLER COMPLETE: CPT | Performed by: INTERNAL MEDICINE

## 2018-03-25 PROCEDURE — 96372 THER/PROPH/DIAG INJ SC/IM: CPT

## 2018-03-25 PROCEDURE — 94640 AIRWAY INHALATION TREATMENT: CPT

## 2018-03-25 PROCEDURE — 25010000002 ENOXAPARIN PER 10 MG: Performed by: FAMILY MEDICINE

## 2018-03-25 RX ORDER — DEXTROSE MONOHYDRATE 25 G/50ML
25 INJECTION, SOLUTION INTRAVENOUS
Status: DISCONTINUED | OUTPATIENT
Start: 2018-03-25 | End: 2018-03-26 | Stop reason: HOSPADM

## 2018-03-25 RX ORDER — NICOTINE POLACRILEX 4 MG
15 LOZENGE BUCCAL
Status: DISCONTINUED | OUTPATIENT
Start: 2018-03-25 | End: 2018-03-26 | Stop reason: HOSPADM

## 2018-03-25 RX ORDER — POTASSIUM CHLORIDE 750 MG/1
20 CAPSULE, EXTENDED RELEASE ORAL ONCE
Status: COMPLETED | OUTPATIENT
Start: 2018-03-25 | End: 2018-03-25

## 2018-03-25 RX ORDER — POTASSIUM CHLORIDE 750 MG/1
10 CAPSULE, EXTENDED RELEASE ORAL 2 TIMES DAILY WITH MEALS
Status: DISCONTINUED | OUTPATIENT
Start: 2018-03-25 | End: 2018-03-26 | Stop reason: HOSPADM

## 2018-03-25 RX ADMIN — FAMOTIDINE 20 MG: 20 TABLET, FILM COATED ORAL at 08:49

## 2018-03-25 RX ADMIN — ALBUTEROL SULFATE 2.5 MG: 2.5 SOLUTION RESPIRATORY (INHALATION) at 04:09

## 2018-03-25 RX ADMIN — ATORVASTATIN CALCIUM 10 MG: 10 TABLET, FILM COATED ORAL at 08:49

## 2018-03-25 RX ADMIN — BUMETANIDE 1 MG: 1 TABLET ORAL at 08:49

## 2018-03-25 RX ADMIN — BUMETANIDE 1 MG: 1 TABLET ORAL at 20:22

## 2018-03-25 RX ADMIN — ALBUTEROL SULFATE 2.5 MG: 2.5 SOLUTION RESPIRATORY (INHALATION) at 19:54

## 2018-03-25 RX ADMIN — LEVOTHYROXINE SODIUM 125 MCG: 0.12 TABLET ORAL at 08:49

## 2018-03-25 RX ADMIN — POTASSIUM CHLORIDE 20 MEQ: 750 CAPSULE, EXTENDED RELEASE ORAL at 13:43

## 2018-03-25 RX ADMIN — ALBUTEROL SULFATE 2.5 MG: 2.5 SOLUTION RESPIRATORY (INHALATION) at 12:01

## 2018-03-25 RX ADMIN — ENOXAPARIN SODIUM 40 MG: 40 INJECTION SUBCUTANEOUS at 08:49

## 2018-03-26 ENCOUNTER — APPOINTMENT (OUTPATIENT)
Dept: CARDIOLOGY | Facility: HOSPITAL | Age: 78
End: 2018-03-26
Attending: INTERNAL MEDICINE

## 2018-03-26 VITALS
HEART RATE: 79 BPM | SYSTOLIC BLOOD PRESSURE: 122 MMHG | TEMPERATURE: 96.9 F | DIASTOLIC BLOOD PRESSURE: 55 MMHG | RESPIRATION RATE: 20 BRPM | OXYGEN SATURATION: 99 % | HEIGHT: 59 IN | WEIGHT: 187.56 LBS | BODY MASS INDEX: 37.81 KG/M2

## 2018-03-26 LAB
ANION GAP SERPL CALCULATED.3IONS-SCNC: 7 MMOL/L (ref 4–13)
BH CV STRESS BP STAGE 1: NORMAL
BH CV STRESS BP STAGE 2: NORMAL
BH CV STRESS BP STAGE 3: NORMAL
BH CV STRESS DOSE DOBUTAMINE STAGE 1: 10
BH CV STRESS DOSE DOBUTAMINE STAGE 2: 20
BH CV STRESS DOSE DOBUTAMINE STAGE 3: 30
BH CV STRESS DURATION MIN STAGE 1: 3
BH CV STRESS DURATION MIN STAGE 2: 3
BH CV STRESS DURATION MIN STAGE 3: 2
BH CV STRESS DURATION SEC STAGE 1: 0
BH CV STRESS DURATION SEC STAGE 2: 0
BH CV STRESS DURATION SEC STAGE 3: 24
BH CV STRESS HR STAGE 1: 60
BH CV STRESS HR STAGE 2: 107
BH CV STRESS HR STAGE 3: 131
BH CV STRESS PROTOCOL 1: NORMAL
BH CV STRESS RECOVERY BP: NORMAL MMHG
BH CV STRESS RECOVERY HR: 90 BPM
BH CV STRESS STAGE 1: 1
BH CV STRESS STAGE 2: 2
BH CV STRESS STAGE 3: 3
BUN BLD-MCNC: 16 MG/DL (ref 5–21)
BUN/CREAT SERPL: 18.8 (ref 7–25)
CALCIUM SPEC-SCNC: 8.8 MG/DL (ref 8.4–10.4)
CHLORIDE SERPL-SCNC: 103 MMOL/L (ref 98–110)
CO2 SERPL-SCNC: 33 MMOL/L (ref 24–31)
CREAT BLD-MCNC: 0.85 MG/DL (ref 0.5–1.4)
CRP SERPL-MCNC: <0.5 MG/DL (ref 0–0.99)
ERYTHROCYTE [SEDIMENTATION RATE] IN BLOOD: 12 MM/HR (ref 0–20)
GFR SERPL CREATININE-BSD FRML MDRD: 65 ML/MIN/1.73
GLUCOSE BLD-MCNC: 70 MG/DL (ref 70–100)
GLUCOSE BLDC GLUCOMTR-MCNC: 118 MG/DL (ref 70–130)
GLUCOSE BLDC GLUCOMTR-MCNC: 73 MG/DL (ref 70–130)
GLUCOSE BLDC GLUCOMTR-MCNC: 76 MG/DL (ref 70–130)
MAXIMAL PREDICTED HEART RATE: 142 BPM
PERCENT MAX PREDICTED HR: 92.25 %
POTASSIUM BLD-SCNC: 3.4 MMOL/L (ref 3.5–5.3)
SODIUM BLD-SCNC: 143 MMOL/L (ref 135–145)
STRESS BASELINE BP: NORMAL MMHG
STRESS BASELINE HR: 63 BPM
STRESS PERCENT HR: 109 %
STRESS POST EXERCISE DUR MIN: 8 MIN
STRESS POST EXERCISE DUR SEC: 24 SEC
STRESS POST PEAK BP: NORMAL MMHG
STRESS POST PEAK HR: 131 BPM
STRESS TARGET HR: 121 BPM

## 2018-03-26 PROCEDURE — 99214 OFFICE O/P EST MOD 30 MIN: CPT | Performed by: INTERNAL MEDICINE

## 2018-03-26 PROCEDURE — G0378 HOSPITAL OBSERVATION PER HR: HCPCS

## 2018-03-26 PROCEDURE — 93352 ADMIN ECG CONTRAST AGENT: CPT | Performed by: INTERNAL MEDICINE

## 2018-03-26 PROCEDURE — 96374 THER/PROPH/DIAG INJ IV PUSH: CPT

## 2018-03-26 PROCEDURE — 25010000002 ONDANSETRON PER 1 MG: Performed by: FAMILY MEDICINE

## 2018-03-26 PROCEDURE — 25010000002 ENOXAPARIN PER 10 MG: Performed by: FAMILY MEDICINE

## 2018-03-26 PROCEDURE — 94799 UNLISTED PULMONARY SVC/PX: CPT

## 2018-03-26 PROCEDURE — 25010000002 PERFLUTREN 6.52 MG/ML SUSPENSION: Performed by: INTERNAL MEDICINE

## 2018-03-26 PROCEDURE — 93018 CV STRESS TEST I&R ONLY: CPT | Performed by: INTERNAL MEDICINE

## 2018-03-26 PROCEDURE — 85651 RBC SED RATE NONAUTOMATED: CPT | Performed by: INTERNAL MEDICINE

## 2018-03-26 PROCEDURE — 25010000003 DOBUTAMINE PER 250 MG: Performed by: INTERNAL MEDICINE

## 2018-03-26 PROCEDURE — 82962 GLUCOSE BLOOD TEST: CPT

## 2018-03-26 PROCEDURE — 93350 STRESS TTE ONLY: CPT

## 2018-03-26 PROCEDURE — 93017 CV STRESS TEST TRACING ONLY: CPT

## 2018-03-26 PROCEDURE — 93350 STRESS TTE ONLY: CPT | Performed by: INTERNAL MEDICINE

## 2018-03-26 PROCEDURE — 86140 C-REACTIVE PROTEIN: CPT | Performed by: INTERNAL MEDICINE

## 2018-03-26 PROCEDURE — 80048 BASIC METABOLIC PNL TOTAL CA: CPT | Performed by: FAMILY MEDICINE

## 2018-03-26 PROCEDURE — 94760 N-INVAS EAR/PLS OXIMETRY 1: CPT

## 2018-03-26 PROCEDURE — 96372 THER/PROPH/DIAG INJ SC/IM: CPT

## 2018-03-26 RX ORDER — ONDANSETRON HCL IN 0.9 % NACL 8 MG/50 ML
8 INTRAVENOUS SOLUTION, PIGGYBACK (ML) INTRAVENOUS EVERY 6 HOURS PRN
Status: DISCONTINUED | OUTPATIENT
Start: 2018-03-26 | End: 2018-03-26 | Stop reason: HOSPADM

## 2018-03-26 RX ORDER — DOBUTAMINE HYDROCHLORIDE 100 MG/100ML
10-50 INJECTION INTRAVENOUS CONTINUOUS
Status: DISCONTINUED | OUTPATIENT
Start: 2018-03-26 | End: 2018-03-26 | Stop reason: HOSPADM

## 2018-03-26 RX ORDER — TRAMADOL HYDROCHLORIDE 50 MG/1
50 TABLET ORAL 2 TIMES DAILY PRN
Qty: 60 TABLET | Refills: 0 | Status: SHIPPED | OUTPATIENT
Start: 2018-03-26 | End: 2019-01-30 | Stop reason: HOSPADM

## 2018-03-26 RX ADMIN — ALBUTEROL SULFATE 2.5 MG: 2.5 SOLUTION RESPIRATORY (INHALATION) at 19:24

## 2018-03-26 RX ADMIN — ENOXAPARIN SODIUM 40 MG: 40 INJECTION SUBCUTANEOUS at 11:57

## 2018-03-26 RX ADMIN — ATORVASTATIN CALCIUM 10 MG: 10 TABLET, FILM COATED ORAL at 14:41

## 2018-03-26 RX ADMIN — ALBUTEROL SULFATE 2.5 MG: 2.5 SOLUTION RESPIRATORY (INHALATION) at 07:33

## 2018-03-26 RX ADMIN — BUMETANIDE 1 MG: 1 TABLET ORAL at 14:41

## 2018-03-26 RX ADMIN — LEVOTHYROXINE SODIUM 125 MCG: 0.12 TABLET ORAL at 14:41

## 2018-03-26 RX ADMIN — POTASSIUM CHLORIDE 10 MEQ: 750 CAPSULE, EXTENDED RELEASE ORAL at 17:42

## 2018-03-26 RX ADMIN — DOBUTAMINE 10 MCG/KG/MIN: 12.5 INJECTION, SOLUTION, CONCENTRATE INTRAVENOUS at 12:37

## 2018-03-26 RX ADMIN — CETIRIZINE HYDROCHLORIDE 5 MG: 5 TABLET ORAL at 14:40

## 2018-03-26 RX ADMIN — SODIUM CHLORIDE 8 MG: 9 INJECTION, SOLUTION INTRAVENOUS at 10:21

## 2018-03-26 RX ADMIN — FAMOTIDINE 20 MG: 20 TABLET, FILM COATED ORAL at 14:41

## 2018-03-26 RX ADMIN — PERFLUTREN 8.48 MG: 6.52 INJECTION, SUSPENSION INTRAVENOUS at 12:50

## 2018-03-28 ENCOUNTER — LAB REQUISITION (OUTPATIENT)
Dept: LAB | Facility: HOSPITAL | Age: 78
End: 2018-03-28

## 2018-03-28 DIAGNOSIS — Z00.00 ENCOUNTER FOR GENERAL ADULT MEDICAL EXAMINATION WITHOUT ABNORMAL FINDINGS: ICD-10-CM

## 2018-03-28 LAB
ANION GAP SERPL CALCULATED.3IONS-SCNC: 8 MMOL/L (ref 4–13)
BUN BLD-MCNC: 19 MG/DL (ref 5–21)
BUN/CREAT SERPL: 21.6 (ref 7–25)
CALCIUM SPEC-SCNC: 8.9 MG/DL (ref 8.4–10.4)
CHLORIDE SERPL-SCNC: 104 MMOL/L (ref 98–110)
CO2 SERPL-SCNC: 29 MMOL/L (ref 24–31)
CREAT BLD-MCNC: 0.88 MG/DL (ref 0.5–1.4)
DEPRECATED RDW RBC AUTO: 47.5 FL (ref 40–54)
ERYTHROCYTE [DISTWIDTH] IN BLOOD BY AUTOMATED COUNT: 13.5 % (ref 12–15)
GFR SERPL CREATININE-BSD FRML MDRD: 62 ML/MIN/1.73
GLUCOSE BLD-MCNC: 78 MG/DL (ref 70–100)
HCT VFR BLD AUTO: 36.2 % (ref 37–47)
HGB BLD-MCNC: 11.9 G/DL (ref 12–16)
MCH RBC QN AUTO: 31.3 PG (ref 28–32)
MCHC RBC AUTO-ENTMCNC: 32.9 G/DL (ref 33–36)
MCV RBC AUTO: 95.3 FL (ref 82–98)
PLATELET # BLD AUTO: 181 10*3/MM3 (ref 130–400)
PMV BLD AUTO: 11.2 FL (ref 6–12)
POTASSIUM BLD-SCNC: 3.8 MMOL/L (ref 3.5–5.3)
RBC # BLD AUTO: 3.8 10*6/MM3 (ref 4.2–5.4)
SODIUM BLD-SCNC: 141 MMOL/L (ref 135–145)
WBC NRBC COR # BLD: 5.1 10*3/MM3 (ref 4.8–10.8)

## 2018-03-28 PROCEDURE — 85027 COMPLETE CBC AUTOMATED: CPT | Performed by: NURSE PRACTITIONER

## 2018-03-28 PROCEDURE — 36415 COLL VENOUS BLD VENIPUNCTURE: CPT | Performed by: NURSE PRACTITIONER

## 2018-03-28 PROCEDURE — 80048 BASIC METABOLIC PNL TOTAL CA: CPT | Performed by: NURSE PRACTITIONER

## 2018-05-07 ENCOUNTER — LAB REQUISITION (OUTPATIENT)
Dept: LAB | Facility: HOSPITAL | Age: 78
End: 2018-05-07

## 2018-05-07 DIAGNOSIS — Z00.00 ENCOUNTER FOR GENERAL ADULT MEDICAL EXAMINATION WITHOUT ABNORMAL FINDINGS: ICD-10-CM

## 2018-05-07 LAB
ANION GAP SERPL CALCULATED.3IONS-SCNC: 5 MMOL/L (ref 4–13)
BUN BLD-MCNC: 20 MG/DL (ref 5–21)
BUN/CREAT SERPL: 22 (ref 7–25)
CALCIUM SPEC-SCNC: 8.8 MG/DL (ref 8.4–10.4)
CHLORIDE SERPL-SCNC: 104 MMOL/L (ref 98–110)
CO2 SERPL-SCNC: 33 MMOL/L (ref 24–31)
CREAT BLD-MCNC: 0.91 MG/DL (ref 0.5–1.4)
GFR SERPL CREATININE-BSD FRML MDRD: 60 ML/MIN/1.73
GLUCOSE BLD-MCNC: 69 MG/DL (ref 70–100)
POTASSIUM BLD-SCNC: 3.6 MMOL/L (ref 3.5–5.3)
SODIUM BLD-SCNC: 142 MMOL/L (ref 135–145)

## 2018-05-07 PROCEDURE — 36415 COLL VENOUS BLD VENIPUNCTURE: CPT | Performed by: NURSE PRACTITIONER

## 2018-05-07 PROCEDURE — 80048 BASIC METABOLIC PNL TOTAL CA: CPT | Performed by: NURSE PRACTITIONER

## 2018-05-14 ENCOUNTER — LAB REQUISITION (OUTPATIENT)
Dept: LAB | Facility: HOSPITAL | Age: 78
End: 2018-05-14

## 2018-05-14 DIAGNOSIS — Z00.00 ENCOUNTER FOR GENERAL ADULT MEDICAL EXAMINATION WITHOUT ABNORMAL FINDINGS: ICD-10-CM

## 2018-05-14 LAB
ANION GAP SERPL CALCULATED.3IONS-SCNC: 9 MMOL/L (ref 4–13)
BUN BLD-MCNC: 19 MG/DL (ref 5–21)
BUN/CREAT SERPL: 22.4 (ref 7–25)
CALCIUM SPEC-SCNC: 8.7 MG/DL (ref 8.4–10.4)
CHLORIDE SERPL-SCNC: 102 MMOL/L (ref 98–110)
CO2 SERPL-SCNC: 32 MMOL/L (ref 24–31)
CREAT BLD-MCNC: 0.85 MG/DL (ref 0.5–1.4)
GFR SERPL CREATININE-BSD FRML MDRD: 65 ML/MIN/1.73
GLUCOSE BLD-MCNC: 63 MG/DL (ref 70–100)
POTASSIUM BLD-SCNC: 3.6 MMOL/L (ref 3.5–5.3)
SODIUM BLD-SCNC: 143 MMOL/L (ref 135–145)

## 2018-05-14 PROCEDURE — 36415 COLL VENOUS BLD VENIPUNCTURE: CPT | Performed by: NURSE PRACTITIONER

## 2018-05-14 PROCEDURE — 80048 BASIC METABOLIC PNL TOTAL CA: CPT | Performed by: NURSE PRACTITIONER

## 2018-05-15 ENCOUNTER — HOSPITAL ENCOUNTER (INPATIENT)
Facility: HOSPITAL | Age: 78
LOS: 2 days | Discharge: NURSING, REHAB OR LONG-TERM CARE | End: 2018-05-17
Attending: FAMILY MEDICINE | Admitting: FAMILY MEDICINE

## 2018-05-15 PROBLEM — L03.90 CELLULITIS: Status: ACTIVE | Noted: 2018-05-15

## 2018-05-15 LAB
ALBUMIN SERPL-MCNC: 3.1 G/DL (ref 3.5–5)
ALBUMIN/GLOB SERPL: 1.4 G/DL (ref 1.1–2.5)
ALP SERPL-CCNC: 90 U/L (ref 24–120)
ALT SERPL W P-5'-P-CCNC: 27 U/L (ref 0–54)
ANION GAP SERPL CALCULATED.3IONS-SCNC: 4 MMOL/L (ref 4–13)
AST SERPL-CCNC: 25 U/L (ref 7–45)
BASOPHILS # BLD AUTO: 0.04 10*3/MM3 (ref 0–0.2)
BASOPHILS NFR BLD AUTO: 0.7 % (ref 0–2)
BILIRUB SERPL-MCNC: 0.3 MG/DL (ref 0.1–1)
BUN BLD-MCNC: 18 MG/DL (ref 5–21)
BUN/CREAT SERPL: 19.8 (ref 7–25)
CALCIUM SPEC-SCNC: 8.9 MG/DL (ref 8.4–10.4)
CHLORIDE SERPL-SCNC: 103 MMOL/L (ref 98–110)
CO2 SERPL-SCNC: 33 MMOL/L (ref 24–31)
CREAT BLD-MCNC: 0.91 MG/DL (ref 0.5–1.4)
DEPRECATED RDW RBC AUTO: 46.3 FL (ref 40–54)
EOSINOPHIL # BLD AUTO: 0.19 10*3/MM3 (ref 0–0.7)
EOSINOPHIL NFR BLD AUTO: 3.3 % (ref 0–4)
ERYTHROCYTE [DISTWIDTH] IN BLOOD BY AUTOMATED COUNT: 13.4 % (ref 12–15)
GFR SERPL CREATININE-BSD FRML MDRD: 60 ML/MIN/1.73
GLOBULIN UR ELPH-MCNC: 2.2 GM/DL
GLUCOSE BLD-MCNC: 83 MG/DL (ref 70–100)
HCT VFR BLD AUTO: 36.2 % (ref 37–47)
HGB BLD-MCNC: 11.9 G/DL (ref 12–16)
IMM GRANULOCYTES # BLD: 0.01 10*3/MM3 (ref 0–0.03)
IMM GRANULOCYTES NFR BLD: 0.2 % (ref 0–5)
LYMPHOCYTES # BLD AUTO: 1.9 10*3/MM3 (ref 0.72–4.86)
LYMPHOCYTES NFR BLD AUTO: 33.3 % (ref 15–45)
MCH RBC QN AUTO: 31 PG (ref 28–32)
MCHC RBC AUTO-ENTMCNC: 32.9 G/DL (ref 33–36)
MCV RBC AUTO: 94.3 FL (ref 82–98)
MONOCYTES # BLD AUTO: 0.53 10*3/MM3 (ref 0.19–1.3)
MONOCYTES NFR BLD AUTO: 9.3 % (ref 4–12)
NEUTROPHILS # BLD AUTO: 3.03 10*3/MM3 (ref 1.87–8.4)
NEUTROPHILS NFR BLD AUTO: 53.2 % (ref 39–78)
NRBC BLD MANUAL-RTO: 0 /100 WBC (ref 0–0)
PLATELET # BLD AUTO: 192 10*3/MM3 (ref 130–400)
PMV BLD AUTO: 10 FL (ref 6–12)
POTASSIUM BLD-SCNC: 3.4 MMOL/L (ref 3.5–5.3)
PROT SERPL-MCNC: 5.3 G/DL (ref 6.3–8.7)
RBC # BLD AUTO: 3.84 10*6/MM3 (ref 4.2–5.4)
SODIUM BLD-SCNC: 140 MMOL/L (ref 135–145)
WBC NRBC COR # BLD: 5.7 10*3/MM3 (ref 4.8–10.8)

## 2018-05-15 PROCEDURE — 85025 COMPLETE CBC W/AUTO DIFF WBC: CPT | Performed by: FAMILY MEDICINE

## 2018-05-15 PROCEDURE — 80053 COMPREHEN METABOLIC PANEL: CPT | Performed by: FAMILY MEDICINE

## 2018-05-15 PROCEDURE — 25010000002 VANCOMYCIN PER 500 MG: Performed by: FAMILY MEDICINE

## 2018-05-15 PROCEDURE — 94799 UNLISTED PULMONARY SVC/PX: CPT

## 2018-05-15 PROCEDURE — 94640 AIRWAY INHALATION TREATMENT: CPT

## 2018-05-15 RX ORDER — FLUTICASONE PROPIONATE 50 MCG
2 SPRAY, SUSPENSION (ML) NASAL DAILY
Status: DISCONTINUED | OUTPATIENT
Start: 2018-05-16 | End: 2018-05-17 | Stop reason: HOSPADM

## 2018-05-15 RX ORDER — TRAMADOL HYDROCHLORIDE 50 MG/1
50 TABLET ORAL 2 TIMES DAILY PRN
Status: DISCONTINUED | OUTPATIENT
Start: 2018-05-15 | End: 2018-05-17 | Stop reason: HOSPADM

## 2018-05-15 RX ORDER — LISINOPRIL 10 MG/1
10 TABLET ORAL
Status: DISCONTINUED | OUTPATIENT
Start: 2018-05-16 | End: 2018-05-17 | Stop reason: HOSPADM

## 2018-05-15 RX ORDER — ACETAMINOPHEN 325 MG/1
650 TABLET ORAL EVERY 4 HOURS PRN
Status: DISCONTINUED | OUTPATIENT
Start: 2018-05-15 | End: 2018-05-15 | Stop reason: SDUPTHER

## 2018-05-15 RX ORDER — MONTELUKAST SODIUM 10 MG/1
10 TABLET ORAL NIGHTLY
Status: DISCONTINUED | OUTPATIENT
Start: 2018-05-15 | End: 2018-05-17 | Stop reason: HOSPADM

## 2018-05-15 RX ORDER — ONDANSETRON 4 MG/1
4 TABLET, FILM COATED ORAL EVERY 6 HOURS PRN
Status: DISCONTINUED | OUTPATIENT
Start: 2018-05-15 | End: 2018-05-17 | Stop reason: HOSPADM

## 2018-05-15 RX ORDER — CETIRIZINE HYDROCHLORIDE 10 MG/1
10 TABLET ORAL DAILY
Status: DISCONTINUED | OUTPATIENT
Start: 2018-05-16 | End: 2018-05-17 | Stop reason: HOSPADM

## 2018-05-15 RX ORDER — HYDROCODONE BITARTRATE AND ACETAMINOPHEN 5; 325 MG/1; MG/1
1 TABLET ORAL EVERY 6 HOURS PRN
Status: DISCONTINUED | OUTPATIENT
Start: 2018-05-15 | End: 2018-05-17 | Stop reason: HOSPADM

## 2018-05-15 RX ORDER — LEVOFLOXACIN 5 MG/ML
500 INJECTION, SOLUTION INTRAVENOUS EVERY 24 HOURS
Status: DISCONTINUED | OUTPATIENT
Start: 2018-05-15 | End: 2018-05-17 | Stop reason: HOSPADM

## 2018-05-15 RX ORDER — BUMETANIDE 0.25 MG/ML
2 INJECTION INTRAMUSCULAR; INTRAVENOUS EVERY 12 HOURS
Status: DISCONTINUED | OUTPATIENT
Start: 2018-05-15 | End: 2018-05-15 | Stop reason: CLARIF

## 2018-05-15 RX ORDER — FAMOTIDINE 20 MG/1
20 TABLET, FILM COATED ORAL 2 TIMES DAILY
Status: DISCONTINUED | OUTPATIENT
Start: 2018-05-15 | End: 2018-05-17 | Stop reason: HOSPADM

## 2018-05-15 RX ORDER — ATORVASTATIN CALCIUM 10 MG/1
10 TABLET, FILM COATED ORAL DAILY
Status: DISCONTINUED | OUTPATIENT
Start: 2018-05-16 | End: 2018-05-17 | Stop reason: HOSPADM

## 2018-05-15 RX ORDER — SPIRONOLACTONE 25 MG/1
25 TABLET ORAL DAILY
Status: DISCONTINUED | OUTPATIENT
Start: 2018-05-16 | End: 2018-05-17 | Stop reason: HOSPADM

## 2018-05-15 RX ORDER — POLYETHYLENE GLYCOL 3350 17 G/17G
17 POWDER, FOR SOLUTION ORAL DAILY
Status: DISCONTINUED | OUTPATIENT
Start: 2018-05-16 | End: 2018-05-17 | Stop reason: HOSPADM

## 2018-05-15 RX ORDER — FUROSEMIDE 10 MG/ML
80 INJECTION INTRAMUSCULAR; INTRAVENOUS EVERY 12 HOURS
Status: DISCONTINUED | OUTPATIENT
Start: 2018-05-15 | End: 2018-05-17 | Stop reason: HOSPADM

## 2018-05-15 RX ORDER — ACETAMINOPHEN 325 MG/1
650 TABLET ORAL EVERY 4 HOURS PRN
Status: DISCONTINUED | OUTPATIENT
Start: 2018-05-15 | End: 2018-05-17 | Stop reason: HOSPADM

## 2018-05-15 RX ORDER — HYDROCODONE BITARTRATE AND ACETAMINOPHEN 5; 325 MG/1; MG/1
1 TABLET ORAL EVERY 4 HOURS PRN
Status: DISCONTINUED | OUTPATIENT
Start: 2018-05-15 | End: 2018-05-17 | Stop reason: HOSPADM

## 2018-05-15 RX ORDER — SODIUM CHLORIDE 0.9 % (FLUSH) 0.9 %
1-10 SYRINGE (ML) INJECTION AS NEEDED
Status: DISCONTINUED | OUTPATIENT
Start: 2018-05-15 | End: 2018-05-17 | Stop reason: HOSPADM

## 2018-05-15 RX ORDER — POTASSIUM CHLORIDE 750 MG/1
10 CAPSULE, EXTENDED RELEASE ORAL 2 TIMES DAILY
Status: DISCONTINUED | OUTPATIENT
Start: 2018-05-15 | End: 2018-05-17

## 2018-05-15 RX ORDER — LEVOTHYROXINE SODIUM 0.12 MG/1
125 TABLET ORAL DAILY
Status: DISCONTINUED | OUTPATIENT
Start: 2018-05-16 | End: 2018-05-17 | Stop reason: HOSPADM

## 2018-05-15 RX ORDER — ALBUTEROL SULFATE 2.5 MG/3ML
2.5 SOLUTION RESPIRATORY (INHALATION) EVERY 6 HOURS PRN
Status: DISCONTINUED | OUTPATIENT
Start: 2018-05-15 | End: 2018-05-17 | Stop reason: HOSPADM

## 2018-05-15 RX ORDER — ONDANSETRON 4 MG/1
4 TABLET, FILM COATED ORAL EVERY 6 HOURS
Status: DISCONTINUED | OUTPATIENT
Start: 2018-05-15 | End: 2018-05-15 | Stop reason: SDUPTHER

## 2018-05-15 RX ORDER — ALBUTEROL SULFATE 2.5 MG/3ML
2.5 SOLUTION RESPIRATORY (INHALATION)
Status: DISCONTINUED | OUTPATIENT
Start: 2018-05-15 | End: 2018-05-17 | Stop reason: HOSPADM

## 2018-05-15 RX ORDER — ONDANSETRON 2 MG/ML
4 INJECTION INTRAMUSCULAR; INTRAVENOUS EVERY 6 HOURS PRN
Status: DISCONTINUED | OUTPATIENT
Start: 2018-05-15 | End: 2018-05-17 | Stop reason: HOSPADM

## 2018-05-15 RX ORDER — ONDANSETRON 4 MG/1
4 TABLET, ORALLY DISINTEGRATING ORAL EVERY 6 HOURS PRN
Status: DISCONTINUED | OUTPATIENT
Start: 2018-05-15 | End: 2018-05-17 | Stop reason: HOSPADM

## 2018-05-15 RX ORDER — DOCUSATE SODIUM 100 MG/1
100 CAPSULE, LIQUID FILLED ORAL DAILY PRN
Status: DISCONTINUED | OUTPATIENT
Start: 2018-05-15 | End: 2018-05-17 | Stop reason: HOSPADM

## 2018-05-15 RX ADMIN — HYDROCODONE BITARTRATE AND ACETAMINOPHEN 1 TABLET: 5; 325 TABLET ORAL at 20:26

## 2018-05-15 RX ADMIN — POTASSIUM CHLORIDE 10 MEQ: 750 CAPSULE, EXTENDED RELEASE ORAL at 23:23

## 2018-05-15 RX ADMIN — ALBUTEROL SULFATE 2.5 MG: 2.5 SOLUTION RESPIRATORY (INHALATION) at 22:03

## 2018-05-15 RX ADMIN — FAMOTIDINE 20 MG: 20 TABLET, FILM COATED ORAL at 23:23

## 2018-05-15 RX ADMIN — MONTELUKAST SODIUM 10 MG: 10 TABLET, FILM COATED ORAL at 23:24

## 2018-05-15 RX ADMIN — VANCOMYCIN HYDROCHLORIDE 1750 MG: 1 INJECTION, POWDER, LYOPHILIZED, FOR SOLUTION INTRAVENOUS at 22:19

## 2018-05-15 NOTE — PLAN OF CARE
Problem: Patient Care Overview  Goal: Plan of Care Review  Outcome: Ongoing (interventions implemented as appropriate)   05/15/18 1325   Coping/Psychosocial   Plan of Care Reviewed With patient   Plan of Care Review   Progress no change   OTHER   Outcome Summary VSS. Patient unable to tolerate ambulation. Transfer from chair to bed was difficult. Admitted to Dr. Brady, waiting for orders at this time. Patient had bilat leg edema with cellulitis present in both legs. Patient is A&Ox4 but has a history of dementia.      Goal: Individualization and Mutuality  Outcome: Ongoing (interventions implemented as appropriate)    Goal: Discharge Needs Assessment  Outcome: Ongoing (interventions implemented as appropriate)    Goal: Interprofessional Rounds/Family Conf  Outcome: Ongoing (interventions implemented as appropriate)      Problem: Fall Risk (Adult)  Goal: Identify Related Risk Factors and Signs and Symptoms  Outcome: Ongoing (interventions implemented as appropriate)    Goal: Absence of Fall  Outcome: Ongoing (interventions implemented as appropriate)      Problem: Skin Injury Risk (Adult)  Goal: Identify Related Risk Factors and Signs and Symptoms  Outcome: Ongoing (interventions implemented as appropriate)    Goal: Skin Health and Integrity  Outcome: Ongoing (interventions implemented as appropriate)

## 2018-05-16 LAB
ANION GAP SERPL CALCULATED.3IONS-SCNC: 7 MMOL/L (ref 4–13)
BASOPHILS # BLD AUTO: 0.04 10*3/MM3 (ref 0–0.2)
BASOPHILS NFR BLD AUTO: 0.9 % (ref 0–2)
BILIRUB UR QL STRIP: NEGATIVE
BUN BLD-MCNC: 16 MG/DL (ref 5–21)
BUN/CREAT SERPL: 19.5 (ref 7–25)
CALCIUM SPEC-SCNC: 8.5 MG/DL (ref 8.4–10.4)
CHLORIDE SERPL-SCNC: 104 MMOL/L (ref 98–110)
CLARITY UR: CLEAR
CO2 SERPL-SCNC: 33 MMOL/L (ref 24–31)
COLOR UR: YELLOW
CREAT BLD-MCNC: 0.82 MG/DL (ref 0.5–1.4)
DEPRECATED RDW RBC AUTO: 45.9 FL (ref 40–54)
EOSINOPHIL # BLD AUTO: 0.14 10*3/MM3 (ref 0–0.7)
EOSINOPHIL NFR BLD AUTO: 3.2 % (ref 0–4)
ERYTHROCYTE [DISTWIDTH] IN BLOOD BY AUTOMATED COUNT: 13.4 % (ref 12–15)
GFR SERPL CREATININE-BSD FRML MDRD: 67 ML/MIN/1.73
GLUCOSE BLD-MCNC: 78 MG/DL (ref 70–100)
GLUCOSE UR STRIP-MCNC: NEGATIVE MG/DL
HCT VFR BLD AUTO: 36.2 % (ref 37–47)
HGB BLD-MCNC: 11.9 G/DL (ref 12–16)
HGB UR QL STRIP.AUTO: NEGATIVE
IMM GRANULOCYTES # BLD: 0.03 10*3/MM3 (ref 0–0.03)
IMM GRANULOCYTES NFR BLD: 0.7 % (ref 0–5)
KETONES UR QL STRIP: NEGATIVE
LEUKOCYTE ESTERASE UR QL STRIP.AUTO: NEGATIVE
LYMPHOCYTES # BLD AUTO: 1.47 10*3/MM3 (ref 0.72–4.86)
LYMPHOCYTES NFR BLD AUTO: 33.6 % (ref 15–45)
MCH RBC QN AUTO: 30.9 PG (ref 28–32)
MCHC RBC AUTO-ENTMCNC: 32.9 G/DL (ref 33–36)
MCV RBC AUTO: 94 FL (ref 82–98)
MONOCYTES # BLD AUTO: 0.52 10*3/MM3 (ref 0.19–1.3)
MONOCYTES NFR BLD AUTO: 11.9 % (ref 4–12)
NEUTROPHILS # BLD AUTO: 2.17 10*3/MM3 (ref 1.87–8.4)
NEUTROPHILS NFR BLD AUTO: 49.7 % (ref 39–78)
NITRITE UR QL STRIP: NEGATIVE
NRBC BLD MANUAL-RTO: 0 /100 WBC (ref 0–0)
PH UR STRIP.AUTO: 7 [PH] (ref 5–8)
PLATELET # BLD AUTO: 114 10*3/MM3 (ref 130–400)
PMV BLD AUTO: 11.8 FL (ref 6–12)
POTASSIUM BLD-SCNC: 3 MMOL/L (ref 3.5–5.3)
PROT UR QL STRIP: NEGATIVE
RBC # BLD AUTO: 3.85 10*6/MM3 (ref 4.2–5.4)
RBC MORPH BLD: NORMAL
SMALL PLATELETS BLD QL SMEAR: NORMAL
SODIUM BLD-SCNC: 144 MMOL/L (ref 135–145)
SP GR UR STRIP: 1.01 (ref 1–1.03)
UROBILINOGEN UR QL STRIP: NORMAL
WBC MORPH BLD: NORMAL
WBC NRBC COR # BLD: 4.37 10*3/MM3 (ref 4.8–10.8)

## 2018-05-16 PROCEDURE — 94799 UNLISTED PULMONARY SVC/PX: CPT

## 2018-05-16 PROCEDURE — 81003 URINALYSIS AUTO W/O SCOPE: CPT | Performed by: FAMILY MEDICINE

## 2018-05-16 PROCEDURE — 85025 COMPLETE CBC W/AUTO DIFF WBC: CPT | Performed by: FAMILY MEDICINE

## 2018-05-16 PROCEDURE — 25010000002 FUROSEMIDE PER 20 MG: Performed by: FAMILY MEDICINE

## 2018-05-16 PROCEDURE — 25010000002 VANCOMYCIN PER 500 MG: Performed by: FAMILY MEDICINE

## 2018-05-16 PROCEDURE — 85007 BL SMEAR W/DIFF WBC COUNT: CPT | Performed by: FAMILY MEDICINE

## 2018-05-16 PROCEDURE — 94760 N-INVAS EAR/PLS OXIMETRY 1: CPT

## 2018-05-16 PROCEDURE — 25010000002 ENOXAPARIN PER 10 MG: Performed by: FAMILY MEDICINE

## 2018-05-16 PROCEDURE — 25010000002 LEVOFLOXACIN PER 250 MG: Performed by: FAMILY MEDICINE

## 2018-05-16 PROCEDURE — 80048 BASIC METABOLIC PNL TOTAL CA: CPT | Performed by: FAMILY MEDICINE

## 2018-05-16 PROCEDURE — 25810000003 SODIUM CHLORIDE 0.9 % WITH KCL 20 MEQ 20-0.9 MEQ/L-% SOLUTION: Performed by: FAMILY MEDICINE

## 2018-05-16 PROCEDURE — 25010000002 POTASSIUM CHLORIDE PER 2 MEQ: Performed by: FAMILY MEDICINE

## 2018-05-16 RX ORDER — POTASSIUM CHLORIDE 14.9 MG/ML
20 INJECTION INTRAVENOUS
Status: DISPENSED | OUTPATIENT
Start: 2018-05-16 | End: 2018-05-16

## 2018-05-16 RX ORDER — SODIUM CHLORIDE AND POTASSIUM CHLORIDE 150; 900 MG/100ML; MG/100ML
25 INJECTION, SOLUTION INTRAVENOUS CONTINUOUS
Status: DISCONTINUED | OUTPATIENT
Start: 2018-05-16 | End: 2018-05-17 | Stop reason: HOSPADM

## 2018-05-16 RX ADMIN — POTASSIUM CHLORIDE 10 MEQ: 750 CAPSULE, EXTENDED RELEASE ORAL at 08:48

## 2018-05-16 RX ADMIN — ALBUTEROL SULFATE 2.5 MG: 2.5 SOLUTION RESPIRATORY (INHALATION) at 23:20

## 2018-05-16 RX ADMIN — LEVOTHYROXINE SODIUM 125 MCG: 0.12 TABLET ORAL at 08:48

## 2018-05-16 RX ADMIN — VANCOMYCIN HYDROCHLORIDE 1750 MG: 1 INJECTION, POWDER, LYOPHILIZED, FOR SOLUTION INTRAVENOUS at 20:10

## 2018-05-16 RX ADMIN — ALBUTEROL SULFATE 2.5 MG: 2.5 SOLUTION RESPIRATORY (INHALATION) at 10:13

## 2018-05-16 RX ADMIN — FAMOTIDINE 20 MG: 20 TABLET, FILM COATED ORAL at 20:10

## 2018-05-16 RX ADMIN — ALBUTEROL SULFATE 2.5 MG: 2.5 SOLUTION RESPIRATORY (INHALATION) at 14:41

## 2018-05-16 RX ADMIN — CETIRIZINE HYDROCHLORIDE 10 MG: 10 TABLET, FILM COATED ORAL at 08:49

## 2018-05-16 RX ADMIN — ENOXAPARIN SODIUM 40 MG: 40 INJECTION SUBCUTANEOUS at 08:47

## 2018-05-16 RX ADMIN — FAMOTIDINE 20 MG: 20 TABLET, FILM COATED ORAL at 08:52

## 2018-05-16 RX ADMIN — POTASSIUM CHLORIDE 20 MEQ: 200 INJECTION, SOLUTION INTRAVENOUS at 16:45

## 2018-05-16 RX ADMIN — POTASSIUM CHLORIDE AND SODIUM CHLORIDE 25 ML/HR: 900; 150 INJECTION, SOLUTION INTRAVENOUS at 16:53

## 2018-05-16 RX ADMIN — FUROSEMIDE 80 MG: 10 INJECTION, SOLUTION INTRAMUSCULAR; INTRAVENOUS at 00:46

## 2018-05-16 RX ADMIN — LEVOFLOXACIN 500 MG: 500 INJECTION, SOLUTION INTRAVENOUS at 01:40

## 2018-05-16 RX ADMIN — POTASSIUM CHLORIDE 10 MEQ: 750 CAPSULE, EXTENDED RELEASE ORAL at 20:10

## 2018-05-16 RX ADMIN — ALBUTEROL SULFATE 2.5 MG: 2.5 SOLUTION RESPIRATORY (INHALATION) at 07:03

## 2018-05-16 RX ADMIN — FUROSEMIDE 80 MG: 10 INJECTION, SOLUTION INTRAMUSCULAR; INTRAVENOUS at 12:00

## 2018-05-16 RX ADMIN — ALBUTEROL SULFATE 2.5 MG: 2.5 SOLUTION RESPIRATORY (INHALATION) at 19:32

## 2018-05-16 RX ADMIN — ATORVASTATIN CALCIUM 10 MG: 10 TABLET, FILM COATED ORAL at 08:48

## 2018-05-16 RX ADMIN — ALBUTEROL SULFATE 2.5 MG: 2.5 SOLUTION RESPIRATORY (INHALATION) at 03:48

## 2018-05-16 RX ADMIN — MONTELUKAST SODIUM 10 MG: 10 TABLET, FILM COATED ORAL at 20:10

## 2018-05-16 NOTE — PLAN OF CARE
Problem: Patient Care Overview  Goal: Plan of Care Review  Outcome: Ongoing (interventions implemented as appropriate)   05/16/18 0440   Coping/Psychosocial   Plan of Care Reviewed With patient   Plan of Care Review   Progress no change   OTHER   Outcome Summary Medicated x1 with Norco for relief of right hip pain, with relief noted. Up x2 assist to bsc. Patient states she uses a walker at nursing home to move from bed to commode. BLE warm, edematous, redness noted. Alert and oriented x 4. VSS. Patient says she wears oxygen at night due to sleep apnea. Received iv vancomycin and levaquin. Scd's on. Safety maintained.      Goal: Individualization and Mutuality  Outcome: Ongoing (interventions implemented as appropriate)    Goal: Discharge Needs Assessment  Outcome: Ongoing (interventions implemented as appropriate)    Goal: Interprofessional Rounds/Family Conf  Outcome: Ongoing (interventions implemented as appropriate)      Problem: Fall Risk (Adult)  Goal: Identify Related Risk Factors and Signs and Symptoms  Outcome: Ongoing (interventions implemented as appropriate)    Goal: Absence of Fall  Outcome: Ongoing (interventions implemented as appropriate)      Problem: Skin Injury Risk (Adult)  Goal: Identify Related Risk Factors and Signs and Symptoms  Outcome: Ongoing (interventions implemented as appropriate)    Goal: Skin Health and Integrity  Outcome: Ongoing (interventions implemented as appropriate)

## 2018-05-16 NOTE — PROGRESS NOTES
"Pharmacy Dosing Service  Pharmacokinetics  Vancomycin Initial Evaluation    Assessment/Action/Plan:  Initiated Vancomycin 1750 mg IVPB every 24 hours.  Pharmacy will monitor renal function and adjust dose accordingly.     Subjective:  Carlotta Ortiz is a 77 y.o. female with a Vancomycin \"Pharmacy to Dose\" consult for the treatment of SSTI .    Objective:  Ht: 149.9 cm (59\"); Wt: 87.3 kg (192 lb 6.4 oz)  Estimated Creatinine Clearance: 53.6 mL/min (by C-G formula based on SCr of 0.85 mg/dL).   Lab Results   Component Value Date    CREATININE 0.85 05/14/2018    CREATININE 0.91 05/07/2018    CREATININE 0.88 03/28/2018      Lab Results   Component Value Date    WBC 5.10 03/28/2018    WBC 4.14 (L) 03/24/2018    WBC 4.95 03/14/2018          Thanks for the consult,     RAMIREZ Ramirez, Pharm.D.    05/15/18 7:54 PM    "

## 2018-05-16 NOTE — PROGRESS NOTES
Discharge Planning Assessment  [facility]     Patient Name: Carlotta Ortiz  MRN: [id number]  Today's Date: 5/16/2018    Admit Date: 5/15/2018          Discharge Needs Assessment     Row Name 05/16/18 1123       Living Environment    Lives With facility resident    Current Living Arrangements extended care facility    Primary Care Provided by other (see comments)   Staff at [facility]    Quality of Family Relationships helpful;involved    Able to Return to Prior Arrangements yes       Transition Planning    Patient/Family Anticipates Transition to long term care facility    Patient/Family Anticipated Services at Transition skilled nursing       Discharge Needs Assessment    Readmission Within the Last 30 Days no previous admission in last 30 days    Concerns to be Addressed discharge planning    Equipment Currently Used at Home other (see comments)   All needed DME provided at [facility]     Discharge Facility/Level of Care Needs nursing facility, basic;nursing facility, intermediate;nursing facility, skilled    Discharge Coordination/Progress Patient is from [facility], intermediate level of care. Patient does have a bed hold. Will follow for discharge back to [facility].             JORGE Cardoso

## 2018-05-16 NOTE — H&P
[facility]  History and Physical    Patient:  Carlotta Ortiz  MRN: [id number]    CHIEF COMPLAINT:  BILATERAL LOWER EXTREMITY SWELLING AND REDNESS/PAIN    History Obtained From: the patient   PCP: Ken Brady MD    HISTORY OF PRESENT ILLNESS:   The patient is a 77 y.o. female who presents with complaints of progressively worsening lower ext redness, swelling and pain.  Known history of lymphedema and recurrent cellulitis infections.     REVIEW OF SYSTEMS:    Constitutional: Negative for activity change, appetite change, chills, fatigue and fever.   HENT: Negative.  Negative for congestion, sinus pressure and sore throat.    Eyes: Negative for pain and discharge.   Respiratory: Negative.  Negative for cough, chest tightness, shortness of breath and wheezing.    Cardiovascular: Negative for chest pain and leg swelling.   Gastrointestinal: Negative for abdominal distention, abdominal pain, diarrhea, nausea and vomiting.   Genitourinary: Negative for difficulty urinating, flank pain, hematuria and urgency.   Musculoskeletal: Negative for arthralgias and back pain.   Skin: Negative for color change, pallor and rash.   Neurological: Negative for dizziness, syncope, numbness and headaches.   Psychiatric/Behavioral: Negative for agitation, behavioral problems and confusion.       Past Medical History:  Past Medical History:   Diagnosis Date   • Asthma    • Cellulitis    • Diabetes mellitus    • Disease of thyroid gland    • Hypercholesteremia    • Hypertension    • Unsteady gait        Past Surgical History:  Past Surgical History:   Procedure Laterality Date   • ADENOIDECTOMY     • ANKLE SURGERY Left    • CHOLECYSTECTOMY     • TONSILLECTOMY     • TUBAL ABDOMINAL LIGATION         Medications Prior to Admission:    Prescriptions Prior to Admission   Medication Sig Dispense Refill Last Dose   • albuterol (PROVENTIL HFA;VENTOLIN HFA) 108 (90 Base) MCG/ACT inhaler Inhale 2 puffs Every 6 (Six) Hours As Needed  for Wheezing or Shortness of Air.   5/14/2018 at Unknown time   • benazepril (LOTENSIN) 10 MG tablet Take 10 mg by mouth Daily.   5/15/2018 at 0600   • bumetanide (BUMEX) 1 MG tablet Take 1 mg by mouth 2 (Two) Times a Day.   5/15/2018 at 0600   • levothyroxine (SYNTHROID, LEVOTHROID) 125 MCG tablet Take 125 mcg by mouth Daily.   5/15/2018 at 0600   • montelukast (SINGULAIR) 10 MG tablet Take 1 tablet by mouth Every Night.   5/14/2018 at 2100   • ondansetron (ZOFRAN) 4 MG tablet Take 1 tablet by mouth Every 6 (Six) Hours. 15 tablet 0 Past Month at Unknown time   • polyethylene glycol (MIRALAX) packet Take 17 g by mouth Daily.   Past Month at Unknown time   • potassium chloride (MICRO-K) 10 MEQ CR capsule Take 1 capsule by mouth 2 (Two) Times a Day.   5/15/2018 at 0600   • pravastatin (PRAVACHOL) 40 MG tablet Take 1 tablet by mouth Daily.   5/15/2018 at 0600   • raNITIdine (ZANTAC) 150 MG tablet Take 150 mg by mouth Every 12 (Twelve) Hours.   5/15/2018 at 0600   • albuterol (PROVENTIL) (2.5 MG/3ML) 0.083% nebulizer solution Take 3 mL by nebulization Every 6 (Six) Hours As Needed for Wheezing or Shortness of Air.   Unknown at Unknown time   • cetirizine (zyrTEC) 10 MG tablet Take 10 mg by mouth Daily.   3/24/2018 at 0900   • docusate sodium (COLACE) 100 MG capsule Take 100 mg by mouth Daily As Needed for Constipation.   Unknown at Unknown time   • fluticasone (FLONASE) 50 MCG/ACT nasal spray 2 sprays into each nostril Daily.      • magnesium hydroxide (MILK OF MAGNESIA) 2400 MG/10ML suspension suspension Take 10 mL by mouth Daily As Needed (constipation). 1000 mL 3 More than a month at Unknown time   • MULTIPLE VITAMIN PO Take 1 tablet by mouth Daily.   3/24/2018 at 0900   • spironolactone (ALDACTONE) 25 MG tablet Take 25 mg by mouth Daily.   3/24/2018 at 0900   • traMADol (ULTRAM) 50 MG tablet Take 1 tablet by mouth 2 (Two) Times a Day As Needed for Moderate Pain . 60 tablet 0 More than a month at Unknown time  "      Allergies:  Aminophylline; Cephalexin; Spiractone [spironolactone]; Sulfa antibiotics; Sulfacetamide sodium; Ampicillin; and Penicillins    Social History:   Social History     Social History   • Marital status:      Spouse name: N/A   • Number of children: N/A   • Years of education: N/A     Occupational History   • Not on file.     Social History Main Topics   • Smoking status: Never Smoker   • Smokeless tobacco: Never Used   • Alcohol use No   • Drug use: No   • Sexual activity: Defer     Other Topics Concern   • Not on file     Social History Narrative   • No narrative on file       Family History:   History reviewed. No pertinent family history.        Physical Exam:    Vitals: /79 (BP Location: Left arm, Patient Position: Lying)   Pulse 60   Temp 98.2 °F (36.8 °C) (Oral)   Resp 16   Ht 149.9 cm (59\")   Wt 87.3 kg (192 lb 6.4 oz)   SpO2 99%   BMI 38.86 kg/m²        General Appearance:    Alert, cooperative, in no acute distress   Head:    Normocephalic, without obvious abnormality, atraumatic   Eyes:            Lids and lashes normal, conjunctivae and sclerae normal, no   icterus, no pallor, corneas clear, PERRLA   Ears:    Ears appear intact with no abnormalities noted   Throat:   No oral lesions, no thrush, oral mucosa moist   Neck:   No adenopathy, supple, trachea midline, no thyromegaly, no   carotid bruit, no JVD   Back:     No kyphosis present, no scoliosis present, no skin lesions,      erythema or scars, no tenderness to percussion or                   palpation,   range of motion normal   Lungs:     Clear to auscultation,respirations regular, even and                  unlabored    Heart:    Regular rhythm and normal rate, normal S1 and S2, no            murmur, no gallop, no rub, no click   Chest Wall:    No abnormalities observed   Abdomen:     Normal bowel sounds, no masses, no organomegaly, soft        non-tender, non-distended, no guarding, no rebound                " tenderness   Rectal:     Deferred   Extremities:   Moves all extremities well, no edema, no cyanosis, no             redness   Pulses:   Pulses palpable and equal bilaterally   Skin:   4+++edema bilateral with superficial erythema and warmth       Lab Results (last 24 hours)     Procedure Component Value Units Date/Time    CBC & Differential [[id number]] Collected:  05/16/18 0540    Specimen:  Blood Updated:  05/16/18 0642    Narrative:       The following orders were created for panel order CBC & Differential.  Procedure                               Abnormality         Status                     ---------                               -----------         ------                     Manual Differential[[id number]]                                                         Scan Slide[[id number]]                                       Final result               CBC Auto Differential[[id number]]        Abnormal            Final result                 Please view results for these tests on the individual orders.    CBC Auto Differential [[id number]]  (Abnormal) Collected:  05/16/18 0540    Specimen:  Blood Updated:  05/16/18 0642     WBC 4.37 (L) 10*3/mm3      RBC 3.85 (L) 10*6/mm3      Hemoglobin 11.9 (L) g/dL      Hematocrit 36.2 (L) %      MCV 94.0 fL      MCH 30.9 pg      MCHC 32.9 (L) g/dL      RDW 13.4 %      RDW-SD 45.9 fl      MPV 11.8 fL      Platelets 114 (L) 10*3/mm3      Neutrophil % 49.7 %      Lymphocyte % 33.6 %      Monocyte % 11.9 %      Eosinophil % 3.2 %      Basophil % 0.9 %      Immature Grans % 0.7 %      Neutrophils, Absolute 2.17 10*3/mm3      Lymphocytes, Absolute 1.47 10*3/mm3      Monocytes, Absolute 0.52 10*3/mm3      Eosinophils, Absolute 0.14 10*3/mm3      Basophils, Absolute 0.04 10*3/mm3      Immature Grans, Absolute 0.03 10*3/mm3      nRBC 0.0 /100 WBC      Comment: Appended report. These results have been appended to a previously preliminary verified report.       Narrative:       Appended  report. These results have been appended to a previously verified report.    Scan Slide [[id number]] Collected:  05/16/18 0540    Specimen:  Blood Updated:  05/16/18 0642     RBC Morphology Normal     WBC Morphology Normal     Platelet Estimate Decreased    Basic Metabolic Panel [[id number]]  (Abnormal) Collected:  05/16/18 0540    Specimen:  Blood Updated:  05/16/18 0625     Glucose 78 mg/dL      BUN 16 mg/dL      Creatinine 0.82 mg/dL      Sodium 144 mmol/L      Potassium 3.0 (L) mmol/L      Chloride 104 mmol/L      CO2 33.0 (H) mmol/L      Calcium 8.5 mg/dL      eGFR Non African Amer 67 mL/min/1.73      BUN/Creatinine Ratio 19.5     Anion Gap 7.0 mmol/L     Narrative:       The MDRD GFR formula is only valid for adults with stable renal function between ages 18 and 70.    Urinalysis With / Culture If Indicated - Urine, Clean Catch [[id number]]  (Normal) Collected:  05/16/18 0409    Specimen:  Urine from Urine, Clean Catch Updated:  05/16/18 0424     Color, UA Yellow     Appearance, UA Clear     pH, UA 7.0     Specific Gravity, UA 1.008     Glucose, UA Negative     Ketones, UA Negative     Bilirubin, UA Negative     Blood, UA Negative     Protein, UA Negative     Leuk Esterase, UA Negative     Nitrite, UA Negative     Urobilinogen, UA 0.2 E.U./dL    Narrative:       Urine microscopic not indicated.    Comprehensive Metabolic Panel [[id number]]  (Abnormal) Collected:  05/15/18 1959    Specimen:  Blood Updated:  05/15/18 2020     Glucose 83 mg/dL      BUN 18 mg/dL      Creatinine 0.91 mg/dL      Sodium 140 mmol/L      Potassium 3.4 (L) mmol/L      Chloride 103 mmol/L      CO2 33.0 (H) mmol/L      Calcium 8.9 mg/dL      Total Protein 5.3 (L) g/dL      Albumin 3.10 (L) g/dL      ALT (SGPT) 27 U/L      AST (SGOT) 25 U/L      Alkaline Phosphatase 90 U/L      Total Bilirubin 0.3 mg/dL      eGFR Non African Amer 60 (L) mL/min/1.73      Globulin 2.2 gm/dL      A/G Ratio 1.4 g/dL      BUN/Creatinine Ratio 19.8     Anion Gap 4.0  mmol/L     Narrative:       The MDRD GFR formula is only valid for adults with stable renal function between ages 18 and 70.    CBC & Differential [[id number]] Collected:  05/15/18 1959    Specimen:  Blood Updated:  05/15/18 2006    Narrative:       The following orders were created for panel order CBC & Differential.  Procedure                               Abnormality         Status                     ---------                               -----------         ------                     CBC Auto Differential[[id number]]        Abnormal            Final result                 Please view results for these tests on the individual orders.    CBC Auto Differential [[id number]]  (Abnormal) Collected:  05/15/18 1959    Specimen:  Blood Updated:  05/15/18 2006     WBC 5.70 10*3/mm3      RBC 3.84 (L) 10*6/mm3      Hemoglobin 11.9 (L) g/dL      Hematocrit 36.2 (L) %      MCV 94.3 fL      MCH 31.0 pg      MCHC 32.9 (L) g/dL      RDW 13.4 %      RDW-SD 46.3 fl      MPV 10.0 fL      Platelets 192 10*3/mm3      Neutrophil % 53.2 %      Lymphocyte % 33.3 %      Monocyte % 9.3 %      Eosinophil % 3.3 %      Basophil % 0.7 %      Immature Grans % 0.2 %      Neutrophils, Absolute 3.03 10*3/mm3      Lymphocytes, Absolute 1.90 10*3/mm3      Monocytes, Absolute 0.53 10*3/mm3      Eosinophils, Absolute 0.19 10*3/mm3      Basophils, Absolute 0.04 10*3/mm3      Immature Grans, Absolute 0.01 10*3/mm3      nRBC 0.0 /100 WBC              Radiology:     No results found.    Assessment and Plan   1.     Active Problems:    Cellulitis  LE LYMPHEDEMA  HYPOKALEMIA    PLAN:    IV ABX  IV DIURESIS  ELEVATED LE      Ken Brady MD

## 2018-05-16 NOTE — PLAN OF CARE
Problem: Patient Care Overview  Goal: Plan of Care Review  Outcome: Ongoing (interventions implemented as appropriate)   05/16/18 1739   Coping/Psychosocial   Plan of Care Reviewed With patient   Plan of Care Review   Progress no change   OTHER   Outcome Summary VSS. Patient able to transfer with moderate difficulty to  commode. Patient refuses turns, education over pressure wounds given. Patient is A&Ox4. Patient will be recieving potassium runs near end of shift. Patient voiding a large amount due to lasix.      Goal: Individualization and Mutuality  Outcome: Ongoing (interventions implemented as appropriate)    Goal: Discharge Needs Assessment  Outcome: Ongoing (interventions implemented as appropriate)    Goal: Interprofessional Rounds/Family Conf  Outcome: Ongoing (interventions implemented as appropriate)      Problem: Fall Risk (Adult)  Goal: Identify Related Risk Factors and Signs and Symptoms  Outcome: Ongoing (interventions implemented as appropriate)    Goal: Absence of Fall  Outcome: Ongoing (interventions implemented as appropriate)      Problem: Skin Injury Risk (Adult)  Goal: Identify Related Risk Factors and Signs and Symptoms  Outcome: Ongoing (interventions implemented as appropriate)    Goal: Skin Health and Integrity  Outcome: Ongoing (interventions implemented as appropriate)

## 2018-05-17 VITALS
DIASTOLIC BLOOD PRESSURE: 74 MMHG | TEMPERATURE: 98 F | SYSTOLIC BLOOD PRESSURE: 144 MMHG | BODY MASS INDEX: 38.79 KG/M2 | RESPIRATION RATE: 18 BRPM | HEART RATE: 57 BPM | WEIGHT: 192.4 LBS | OXYGEN SATURATION: 99 % | HEIGHT: 59 IN

## 2018-05-17 LAB
ALBUMIN SERPL-MCNC: 3 G/DL (ref 3.5–5)
ALBUMIN/GLOB SERPL: 1.4 G/DL (ref 1.1–2.5)
ALP SERPL-CCNC: 79 U/L (ref 24–120)
ALT SERPL W P-5'-P-CCNC: 21 U/L (ref 0–54)
ANION GAP SERPL CALCULATED.3IONS-SCNC: 7 MMOL/L (ref 4–13)
AST SERPL-CCNC: 22 U/L (ref 7–45)
BASOPHILS # BLD AUTO: 0.03 10*3/MM3 (ref 0–0.2)
BASOPHILS NFR BLD AUTO: 0.8 % (ref 0–2)
BILIRUB SERPL-MCNC: 0.4 MG/DL (ref 0.1–1)
BUN BLD-MCNC: 15 MG/DL (ref 5–21)
BUN/CREAT SERPL: 18.3 (ref 7–25)
CALCIUM SPEC-SCNC: 8.8 MG/DL (ref 8.4–10.4)
CHLORIDE SERPL-SCNC: 104 MMOL/L (ref 98–110)
CO2 SERPL-SCNC: 32 MMOL/L (ref 24–31)
CREAT BLD-MCNC: 0.82 MG/DL (ref 0.5–1.4)
DEPRECATED RDW RBC AUTO: 46.2 FL (ref 40–54)
EOSINOPHIL # BLD AUTO: 0.08 10*3/MM3 (ref 0–0.7)
EOSINOPHIL NFR BLD AUTO: 2 % (ref 0–4)
ERYTHROCYTE [DISTWIDTH] IN BLOOD BY AUTOMATED COUNT: 13.3 % (ref 12–15)
GFR SERPL CREATININE-BSD FRML MDRD: 67 ML/MIN/1.73
GLOBULIN UR ELPH-MCNC: 2.2 GM/DL
GLUCOSE BLD-MCNC: 84 MG/DL (ref 70–100)
HCT VFR BLD AUTO: 35.1 % (ref 37–47)
HGB BLD-MCNC: 11.4 G/DL (ref 12–16)
IMM GRANULOCYTES # BLD: 0.01 10*3/MM3 (ref 0–0.03)
IMM GRANULOCYTES NFR BLD: 0.3 % (ref 0–5)
LYMPHOCYTES # BLD AUTO: 1.41 10*3/MM3 (ref 0.72–4.86)
LYMPHOCYTES NFR BLD AUTO: 35.3 % (ref 15–45)
MCH RBC QN AUTO: 30.5 PG (ref 28–32)
MCHC RBC AUTO-ENTMCNC: 32.5 G/DL (ref 33–36)
MCV RBC AUTO: 93.9 FL (ref 82–98)
MONOCYTES # BLD AUTO: 0.44 10*3/MM3 (ref 0.19–1.3)
MONOCYTES NFR BLD AUTO: 11 % (ref 4–12)
NEUTROPHILS # BLD AUTO: 2.03 10*3/MM3 (ref 1.87–8.4)
NEUTROPHILS NFR BLD AUTO: 50.6 % (ref 39–78)
NRBC BLD MANUAL-RTO: 0 /100 WBC (ref 0–0)
PLATELET # BLD AUTO: 176 10*3/MM3 (ref 130–400)
PMV BLD AUTO: 10.7 FL (ref 6–12)
POTASSIUM BLD-SCNC: 3.1 MMOL/L (ref 3.5–5.3)
PROT SERPL-MCNC: 5.2 G/DL (ref 6.3–8.7)
RBC # BLD AUTO: 3.74 10*6/MM3 (ref 4.2–5.4)
SODIUM BLD-SCNC: 143 MMOL/L (ref 135–145)
WBC NRBC COR # BLD: 4 10*3/MM3 (ref 4.8–10.8)

## 2018-05-17 PROCEDURE — 94799 UNLISTED PULMONARY SVC/PX: CPT

## 2018-05-17 PROCEDURE — 25010000002 ENOXAPARIN PER 10 MG: Performed by: FAMILY MEDICINE

## 2018-05-17 PROCEDURE — 94760 N-INVAS EAR/PLS OXIMETRY 1: CPT

## 2018-05-17 PROCEDURE — 80053 COMPREHEN METABOLIC PANEL: CPT | Performed by: FAMILY MEDICINE

## 2018-05-17 PROCEDURE — 85025 COMPLETE CBC W/AUTO DIFF WBC: CPT | Performed by: FAMILY MEDICINE

## 2018-05-17 RX ORDER — BUMETANIDE 1 MG/1
2 TABLET ORAL 2 TIMES DAILY
Qty: 60 TABLET | Refills: 0 | Status: SHIPPED | OUTPATIENT
Start: 2018-05-17 | End: 2019-09-10

## 2018-05-17 RX ORDER — LEVOFLOXACIN 500 MG/1
500 TABLET, FILM COATED ORAL DAILY
Qty: 7 TABLET | Refills: 0 | OUTPATIENT
Start: 2018-05-17 | End: 2019-01-30 | Stop reason: HOSPADM

## 2018-05-17 RX ORDER — POTASSIUM CHLORIDE 750 MG/1
40 CAPSULE, EXTENDED RELEASE ORAL 2 TIMES DAILY
Status: DISCONTINUED | OUTPATIENT
Start: 2018-05-17 | End: 2018-05-17 | Stop reason: HOSPADM

## 2018-05-17 RX ADMIN — CETIRIZINE HYDROCHLORIDE 10 MG: 10 TABLET, FILM COATED ORAL at 10:17

## 2018-05-17 RX ADMIN — SPIRONOLACTONE 25 MG: 25 TABLET ORAL at 10:17

## 2018-05-17 RX ADMIN — FAMOTIDINE 20 MG: 20 TABLET, FILM COATED ORAL at 10:17

## 2018-05-17 RX ADMIN — LISINOPRIL 10 MG: 10 TABLET ORAL at 10:17

## 2018-05-17 RX ADMIN — POTASSIUM CHLORIDE 40 MEQ: 750 CAPSULE, EXTENDED RELEASE ORAL at 10:17

## 2018-05-17 RX ADMIN — ENOXAPARIN SODIUM 40 MG: 40 INJECTION SUBCUTANEOUS at 10:18

## 2018-05-17 RX ADMIN — ALBUTEROL SULFATE 2.5 MG: 2.5 SOLUTION RESPIRATORY (INHALATION) at 03:01

## 2018-05-17 RX ADMIN — FLUTICASONE PROPIONATE 2 SPRAY: 50 SPRAY, METERED NASAL at 10:18

## 2018-05-17 RX ADMIN — LEVOTHYROXINE SODIUM 125 MCG: 0.12 TABLET ORAL at 10:17

## 2018-05-17 RX ADMIN — ATORVASTATIN CALCIUM 10 MG: 10 TABLET, FILM COATED ORAL at 10:17

## 2018-05-17 RX ADMIN — ALBUTEROL SULFATE 2.5 MG: 2.5 SOLUTION RESPIRATORY (INHALATION) at 06:37

## 2018-05-17 NOTE — PROGRESS NOTES
Continued Stay Note   [place]     Patient Name: Carlotta Ortiz  MRN: [id number]  Today's Date: 5/17/2018    Admit Date: 5/15/2018          Discharge Plan     Row Name 05/17/18 0925       Plan    Plan [facility]    Final Discharge Disposition Code 04 - intermediate care facility    Final Note Patient is discharged back to [facility] today, IC level of care. Discharge summary, discharge med list, and a copy of scripts to be faxed to [phone]. Patient's nurse will call report to [phone].                Expected Discharge Date and Time     Expected Discharge Date Expected Discharge Time    May 17, 2018             JORGE Cardoso

## 2018-05-17 NOTE — DISCHARGE SUMMARY
Hospital Discharge Summary    Carlotta Ortiz  :  1940  MRN:  [id number]    Admit date:  5/15/2018  Discharge date:   2018    Admitting Physician:    Ken Brady MD    Discharge Diagnoses:    Active Problems:    Cellulitis  LYMPHEDEMA LOWER EXT  HYPOKALEMIA    Hospital Course:   The patient was admitted for the above noted medical/surgical issues. Please see daily progress note for further details concerning their stay. The patient improved throughout their stay and reached maximum medical improvement on the day of discharge. The patient/family agree with the treatment plan as outlined above. All questions concerning their stay were answered prior to discharge. They understand the importance of follow up concerning any abnormal test results.   THE PATIENT WILL NEED TO HAVE BUN/CREAT AND ELECTROLYTES FOLLOWED CLOSELY IN NH AS WE HAVE DOUBLED HER BUMEX AND WILL LIKELY NEED TO BE ADJUSTED DOWN IN NEXT FEW DAYS.  KEEP HER COMPRESSION STOCKING ON AT ALL TIMES AND REQUIRE HER TO ELEVATE LEGS!!!    Discharge Medications:       Carlotta Ortiz   Home Medication Instructions TALAT:[id number]    Printed on:18 5216   Medication Information                      albuterol (PROVENTIL HFA;VENTOLIN HFA) 108 (90 Base) MCG/ACT inhaler  Inhale 2 puffs Every 6 (Six) Hours As Needed for Wheezing or Shortness of Air.             albuterol (PROVENTIL) (2.5 MG/3ML) 0.083% nebulizer solution  Take 3 mL by nebulization Every 6 (Six) Hours As Needed for Wheezing or Shortness of Air.             benazepril (LOTENSIN) 10 MG tablet  Take 10 mg by mouth Daily.             bumetanide (BUMEX) 1 MG tablet  Take 2 tablets by mouth 2 (Two) Times a Day.             cetirizine (zyrTEC) 10 MG tablet  Take 10 mg by mouth Daily.             docusate sodium (COLACE) 100 MG capsule  Take 100 mg by mouth Daily As Needed for Constipation.             fluticasone (FLONASE) 50 MCG/ACT nasal spray  2 sprays into each nostril Daily.              levoFLOXacin (LEVAQUIN) 500 MG tablet  Take 1 tablet by mouth Daily.             levothyroxine (SYNTHROID, LEVOTHROID) 125 MCG tablet  Take 125 mcg by mouth Daily.             magnesium hydroxide (MILK OF MAGNESIA) 2400 MG/10ML suspension suspension  Take 10 mL by mouth Daily As Needed (constipation).             montelukast (SINGULAIR) 10 MG tablet  Take 1 tablet by mouth Every Night.             MULTIPLE VITAMIN PO  Take 1 tablet by mouth Daily.             ondansetron (ZOFRAN) 4 MG tablet  Take 1 tablet by mouth Every 6 (Six) Hours.             polyethylene glycol (MIRALAX) packet  Take 17 g by mouth Daily.             potassium chloride (MICRO-K) 10 MEQ CR capsule  Take 1 capsule by mouth 2 (Two) Times a Day.             pravastatin (PRAVACHOL) 40 MG tablet  Take 1 tablet by mouth Daily.             raNITIdine (ZANTAC) 150 MG tablet  Take 150 mg by mouth Every 12 (Twelve) Hours.             spironolactone (ALDACTONE) 25 MG tablet  Take 25 mg by mouth Daily.             traMADol (ULTRAM) 50 MG tablet  Take 1 tablet by mouth 2 (Two) Times a Day As Needed for Moderate Pain .                 Consults:   NONE  Significant Diagnostic Studies:      EKG: unchanged from previous tracings.      Treatments:   IV ABX, IV DIURESIS    Disposition:   [facility]  Follow up with Ken Brady MD in 1 weeks.    Signed:  Ken Brady MD MPH  5/17/2018, 9:09 AM

## 2018-05-17 NOTE — SIGNIFICANT NOTE
PT CO pain with IV potassium and Vanc. Started crying when Vancomycin started. Stopped infusion and started New IV in other arm. Rate running low along with IV fluids and patient continues to cry and complain. Patient is now refusing all IV medications.  PT also refuses to elevate BLE, states it will hurt her hip.

## 2018-05-17 NOTE — PLAN OF CARE
Problem: Patient Care Overview  Goal: Plan of Care Review  Outcome: Ongoing (interventions implemented as appropriate)   05/17/18 0257   Coping/Psychosocial   Plan of Care Reviewed With patient   Plan of Care Review   Progress no change   OTHER   Outcome Summary PT made CO IV hurting, crying when Vanc started. New IV started in other arm, Patient continued to cry and refused all IV meds, stating she will talk to the Dr in the morning. Unable to complete IV potassium or ABX. BLE red with edmea, pt refused to elevate on pillow. Pt anxious to return to NH so she doesn't lose her bed.        Problem: Fall Risk (Adult)  Goal: Identify Related Risk Factors and Signs and Symptoms  Outcome: Outcome(s) achieved Date Met: 05/17/18    Goal: Absence of Fall  Outcome: Ongoing (interventions implemented as appropriate)      Problem: Skin Injury Risk (Adult)  Goal: Identify Related Risk Factors and Signs and Symptoms  Outcome: Outcome(s) achieved Date Met: 05/17/18    Goal: Skin Health and Integrity  Outcome: Ongoing (interventions implemented as appropriate)

## 2018-05-17 NOTE — PLAN OF CARE
Problem: Patient Care Overview  Goal: Plan of Care Review  Outcome: Ongoing (interventions implemented as appropriate)   05/17/18 1053   Coping/Psychosocial   Plan of Care Reviewed With patient   Plan of Care Review   Progress no change   OTHER   Outcome Summary Pt discharging back to SNF today. OT will defer eval to discharge facility.

## 2018-05-19 ENCOUNTER — LAB REQUISITION (OUTPATIENT)
Dept: LAB | Facility: HOSPITAL | Age: 78
End: 2018-05-19

## 2018-05-19 DIAGNOSIS — Z00.00 ENCOUNTER FOR GENERAL ADULT MEDICAL EXAMINATION WITHOUT ABNORMAL FINDINGS: ICD-10-CM

## 2018-05-19 LAB
ANION GAP SERPL CALCULATED.3IONS-SCNC: 9 MMOL/L (ref 4–13)
BASOPHILS # BLD AUTO: 0.05 10*3/MM3 (ref 0–0.2)
BASOPHILS NFR BLD AUTO: 1.1 % (ref 0–2)
BUN BLD-MCNC: 16 MG/DL (ref 5–21)
BUN/CREAT SERPL: 18 (ref 7–25)
CALCIUM SPEC-SCNC: 9.2 MG/DL (ref 8.4–10.4)
CHLORIDE SERPL-SCNC: 105 MMOL/L (ref 98–110)
CO2 SERPL-SCNC: 29 MMOL/L (ref 24–31)
CREAT BLD-MCNC: 0.89 MG/DL (ref 0.5–1.4)
DEPRECATED RDW RBC AUTO: 47 FL (ref 40–54)
EOSINOPHIL # BLD AUTO: 0.24 10*3/MM3 (ref 0–0.7)
EOSINOPHIL NFR BLD AUTO: 5.4 % (ref 0–4)
ERYTHROCYTE [DISTWIDTH] IN BLOOD BY AUTOMATED COUNT: 13.3 % (ref 12–15)
GFR SERPL CREATININE-BSD FRML MDRD: 61 ML/MIN/1.73
GLUCOSE BLD-MCNC: 82 MG/DL (ref 70–100)
HCT VFR BLD AUTO: 38 % (ref 37–47)
HGB BLD-MCNC: 12.6 G/DL (ref 12–16)
IMM GRANULOCYTES # BLD: 0 10*3/MM3 (ref 0–0.03)
IMM GRANULOCYTES NFR BLD: 0 % (ref 0–5)
LYMPHOCYTES # BLD AUTO: 1.56 10*3/MM3 (ref 0.72–4.86)
LYMPHOCYTES NFR BLD AUTO: 35.1 % (ref 15–45)
MCH RBC QN AUTO: 31.6 PG (ref 28–32)
MCHC RBC AUTO-ENTMCNC: 33.2 G/DL (ref 33–36)
MCV RBC AUTO: 95.2 FL (ref 82–98)
MONOCYTES # BLD AUTO: 0.32 10*3/MM3 (ref 0.19–1.3)
MONOCYTES NFR BLD AUTO: 7.2 % (ref 4–12)
NEUTROPHILS # BLD AUTO: 2.28 10*3/MM3 (ref 1.87–8.4)
NEUTROPHILS NFR BLD AUTO: 51.2 % (ref 39–78)
NRBC BLD MANUAL-RTO: 0 /100 WBC (ref 0–0)
PLATELET # BLD AUTO: 192 10*3/MM3 (ref 130–400)
PMV BLD AUTO: 10.6 FL (ref 6–12)
POTASSIUM BLD-SCNC: 4.3 MMOL/L (ref 3.5–5.3)
RBC # BLD AUTO: 3.99 10*6/MM3 (ref 4.2–5.4)
SODIUM BLD-SCNC: 143 MMOL/L (ref 135–145)
WBC NRBC COR # BLD: 4.45 10*3/MM3 (ref 4.8–10.8)

## 2018-05-19 PROCEDURE — 85025 COMPLETE CBC W/AUTO DIFF WBC: CPT | Performed by: FAMILY MEDICINE

## 2018-05-19 PROCEDURE — 80048 BASIC METABOLIC PNL TOTAL CA: CPT | Performed by: FAMILY MEDICINE

## 2018-08-08 ENCOUNTER — LAB REQUISITION (OUTPATIENT)
Dept: LAB | Facility: HOSPITAL | Age: 78
End: 2018-08-08

## 2018-08-08 DIAGNOSIS — Z00.00 ENCOUNTER FOR GENERAL ADULT MEDICAL EXAMINATION WITHOUT ABNORMAL FINDINGS: ICD-10-CM

## 2018-08-08 LAB
ANION GAP SERPL CALCULATED.3IONS-SCNC: 10 MMOL/L (ref 4–13)
BASOPHILS # BLD AUTO: 0.02 10*3/MM3 (ref 0–0.2)
BASOPHILS NFR BLD AUTO: 0.4 % (ref 0–2)
BUN BLD-MCNC: 20 MG/DL (ref 5–21)
BUN/CREAT SERPL: 22.2 (ref 7–25)
CALCIUM SPEC-SCNC: 9.1 MG/DL (ref 8.4–10.4)
CHLORIDE SERPL-SCNC: 102 MMOL/L (ref 98–110)
CO2 SERPL-SCNC: 28 MMOL/L (ref 24–31)
CREAT BLD-MCNC: 0.9 MG/DL (ref 0.5–1.4)
DEPRECATED RDW RBC AUTO: 46.7 FL (ref 40–54)
EOSINOPHIL # BLD AUTO: 0.16 10*3/MM3 (ref 0–0.7)
EOSINOPHIL NFR BLD AUTO: 3.3 % (ref 0–4)
ERYTHROCYTE [DISTWIDTH] IN BLOOD BY AUTOMATED COUNT: 13.6 % (ref 12–15)
GFR SERPL CREATININE-BSD FRML MDRD: 61 ML/MIN/1.73
GLUCOSE BLD-MCNC: 69 MG/DL (ref 70–100)
HCT VFR BLD AUTO: 34.8 % (ref 37–47)
HGB BLD-MCNC: 11.4 G/DL (ref 12–16)
IMM GRANULOCYTES # BLD: 0 10*3/MM3 (ref 0–0.03)
IMM GRANULOCYTES NFR BLD: 0 % (ref 0–5)
LYMPHOCYTES # BLD AUTO: 1.99 10*3/MM3 (ref 0.72–4.86)
LYMPHOCYTES NFR BLD AUTO: 40.7 % (ref 15–45)
MCH RBC QN AUTO: 30.7 PG (ref 28–32)
MCHC RBC AUTO-ENTMCNC: 32.8 G/DL (ref 33–36)
MCV RBC AUTO: 93.8 FL (ref 82–98)
MONOCYTES # BLD AUTO: 0.47 10*3/MM3 (ref 0.19–1.3)
MONOCYTES NFR BLD AUTO: 9.6 % (ref 4–12)
NEUTROPHILS # BLD AUTO: 2.25 10*3/MM3 (ref 1.87–8.4)
NEUTROPHILS NFR BLD AUTO: 46 % (ref 39–78)
NRBC BLD MANUAL-RTO: 0 /100 WBC (ref 0–0)
PLATELET # BLD AUTO: 179 10*3/MM3 (ref 130–400)
PMV BLD AUTO: 11.3 FL (ref 6–12)
POTASSIUM BLD-SCNC: 3.6 MMOL/L (ref 3.5–5.3)
RBC # BLD AUTO: 3.71 10*6/MM3 (ref 4.2–5.4)
SODIUM BLD-SCNC: 140 MMOL/L (ref 135–145)
WBC NRBC COR # BLD: 4.89 10*3/MM3 (ref 4.8–10.8)

## 2018-08-08 PROCEDURE — 80048 BASIC METABOLIC PNL TOTAL CA: CPT | Performed by: NURSE PRACTITIONER

## 2018-08-08 PROCEDURE — 36415 COLL VENOUS BLD VENIPUNCTURE: CPT | Performed by: NURSE PRACTITIONER

## 2018-08-08 PROCEDURE — 85025 COMPLETE CBC W/AUTO DIFF WBC: CPT | Performed by: NURSE PRACTITIONER

## 2018-08-10 ENCOUNTER — HOSPITAL ENCOUNTER (EMERGENCY)
Facility: HOSPITAL | Age: 78
Discharge: HOME | End: 2018-08-10
Attending: EMERGENCY MEDICINE | Admitting: EMERGENCY MEDICINE

## 2018-08-10 ENCOUNTER — APPOINTMENT (OUTPATIENT)
Dept: GENERAL RADIOLOGY | Facility: HOSPITAL | Age: 78
End: 2018-08-10

## 2018-08-10 VITALS
OXYGEN SATURATION: 93 % | WEIGHT: 177 LBS | HEIGHT: 59 IN | DIASTOLIC BLOOD PRESSURE: 50 MMHG | RESPIRATION RATE: 16 BRPM | HEART RATE: 78 BPM | SYSTOLIC BLOOD PRESSURE: 110 MMHG | BODY MASS INDEX: 35.68 KG/M2 | TEMPERATURE: 96.5 F

## 2018-08-10 DIAGNOSIS — J45.31 MILD PERSISTENT ASTHMA WITH EXACERBATION: ICD-10-CM

## 2018-08-10 DIAGNOSIS — J06.9 UPPER RESPIRATORY TRACT INFECTION, UNSPECIFIED TYPE: Primary | ICD-10-CM

## 2018-08-10 LAB
ANION GAP SERPL CALCULATED.3IONS-SCNC: 8 MMOL/L (ref 4–13)
ATMOSPHERIC PRESS: 750 MMHG
BASE EXCESS BLDV CALC-SCNC: 6.4 MMOL/L (ref 0–2)
BASOPHILS # BLD AUTO: 0.05 10*3/MM3 (ref 0–0.2)
BASOPHILS NFR BLD AUTO: 0.8 % (ref 0–2)
BDY SITE: ABNORMAL
BODY TEMPERATURE: 37 C
BUN BLD-MCNC: 21 MG/DL (ref 5–21)
BUN/CREAT SERPL: 24.1 (ref 7–25)
CALCIUM SPEC-SCNC: 9 MG/DL (ref 8.4–10.4)
CHLORIDE SERPL-SCNC: 103 MMOL/L (ref 98–110)
CK MB SERPL-CCNC: 0.35 NG/ML (ref 0–5)
CO2 SERPL-SCNC: 30 MMOL/L (ref 24–31)
CREAT BLD-MCNC: 0.87 MG/DL (ref 0.5–1.4)
DEPRECATED RDW RBC AUTO: 45.6 FL (ref 40–54)
EOSINOPHIL # BLD AUTO: 0.13 10*3/MM3 (ref 0–0.7)
EOSINOPHIL NFR BLD AUTO: 2.2 % (ref 0–4)
ERYTHROCYTE [DISTWIDTH] IN BLOOD BY AUTOMATED COUNT: 13.3 % (ref 12–15)
GFR SERPL CREATININE-BSD FRML MDRD: 63 ML/MIN/1.73
GLUCOSE BLD-MCNC: 76 MG/DL (ref 70–100)
HCO3 BLDV-SCNC: 30.6 MMOL/L (ref 22–28)
HCT VFR BLD AUTO: 36.7 % (ref 37–47)
HGB BLD-MCNC: 12.3 G/DL (ref 12–16)
HOLD SPECIMEN: NORMAL
HOLD SPECIMEN: NORMAL
IMM GRANULOCYTES # BLD: 0.02 10*3/MM3 (ref 0–0.03)
IMM GRANULOCYTES NFR BLD: 0.3 % (ref 0–5)
LYMPHOCYTES # BLD AUTO: 2.33 10*3/MM3 (ref 0.72–4.86)
LYMPHOCYTES NFR BLD AUTO: 38.9 % (ref 15–45)
Lab: ABNORMAL
MCH RBC QN AUTO: 31 PG (ref 28–32)
MCHC RBC AUTO-ENTMCNC: 33.5 G/DL (ref 33–36)
MCV RBC AUTO: 92.4 FL (ref 82–98)
MODALITY: ABNORMAL
MONOCYTES # BLD AUTO: 0.58 10*3/MM3 (ref 0.19–1.3)
MONOCYTES NFR BLD AUTO: 9.7 % (ref 4–12)
NEUTROPHILS # BLD AUTO: 2.88 10*3/MM3 (ref 1.87–8.4)
NEUTROPHILS NFR BLD AUTO: 48.1 % (ref 39–78)
NRBC BLD MANUAL-RTO: 0 /100 WBC (ref 0–0)
NT-PROBNP SERPL-MCNC: 116 PG/ML (ref 0–1800)
PCO2 BLDV: 41.7 MM HG (ref 41–51)
PH BLDV: 7.47 PH UNITS (ref 7.32–7.42)
PLATELET # BLD AUTO: 203 10*3/MM3 (ref 130–400)
PMV BLD AUTO: 10.8 FL (ref 6–12)
PO2 BLDV: 47.6 MM HG (ref 27–53)
POTASSIUM BLD-SCNC: 3.5 MMOL/L (ref 3.5–5.3)
RBC # BLD AUTO: 3.97 10*6/MM3 (ref 4.2–5.4)
SAO2 % BLDCOV: 85.4 % (ref 45–75)
SODIUM BLD-SCNC: 141 MMOL/L (ref 135–145)
TROPONIN I SERPL-MCNC: <0.012 NG/ML (ref 0–0.03)
VENTILATOR MODE: ABNORMAL
WBC NRBC COR # BLD: 5.99 10*3/MM3 (ref 4.8–10.8)
WHOLE BLOOD HOLD SPECIMEN: NORMAL
WHOLE BLOOD HOLD SPECIMEN: NORMAL

## 2018-08-10 PROCEDURE — 93010 ELECTROCARDIOGRAM REPORT: CPT | Performed by: INTERNAL MEDICINE

## 2018-08-10 PROCEDURE — 84484 ASSAY OF TROPONIN QUANT: CPT | Performed by: EMERGENCY MEDICINE

## 2018-08-10 PROCEDURE — 99284 EMERGENCY DEPT VISIT MOD MDM: CPT

## 2018-08-10 PROCEDURE — 96374 THER/PROPH/DIAG INJ IV PUSH: CPT

## 2018-08-10 PROCEDURE — 82553 CREATINE MB FRACTION: CPT | Performed by: EMERGENCY MEDICINE

## 2018-08-10 PROCEDURE — 94760 N-INVAS EAR/PLS OXIMETRY 1: CPT

## 2018-08-10 PROCEDURE — 94799 UNLISTED PULMONARY SVC/PX: CPT

## 2018-08-10 PROCEDURE — 83880 ASSAY OF NATRIURETIC PEPTIDE: CPT | Performed by: EMERGENCY MEDICINE

## 2018-08-10 PROCEDURE — 93005 ELECTROCARDIOGRAM TRACING: CPT | Performed by: EMERGENCY MEDICINE

## 2018-08-10 PROCEDURE — 80048 BASIC METABOLIC PNL TOTAL CA: CPT | Performed by: EMERGENCY MEDICINE

## 2018-08-10 PROCEDURE — 71045 X-RAY EXAM CHEST 1 VIEW: CPT

## 2018-08-10 PROCEDURE — 82803 BLOOD GASES ANY COMBINATION: CPT

## 2018-08-10 PROCEDURE — 85025 COMPLETE CBC W/AUTO DIFF WBC: CPT | Performed by: EMERGENCY MEDICINE

## 2018-08-10 PROCEDURE — 25010000002 METHYLPREDNISOLONE PER 125 MG: Performed by: EMERGENCY MEDICINE

## 2018-08-10 PROCEDURE — 94640 AIRWAY INHALATION TREATMENT: CPT

## 2018-08-10 RX ORDER — AZITHROMYCIN 250 MG/1
250 TABLET, FILM COATED ORAL DAILY
Qty: 6 TABLET | Refills: 0 | Status: SHIPPED | OUTPATIENT
Start: 2018-08-10 | End: 2019-09-10

## 2018-08-10 RX ORDER — PREDNISONE 20 MG/1
40 TABLET ORAL DAILY
Qty: 10 TABLET | Refills: 0 | Status: SHIPPED | OUTPATIENT
Start: 2018-08-10 | End: 2019-09-16 | Stop reason: HOSPADM

## 2018-08-10 RX ORDER — FLUTICASONE PROPIONATE 50 MCG
2 SPRAY, SUSPENSION (ML) NASAL DAILY
Qty: 1 BOTTLE | Refills: 0 | Status: SHIPPED | OUTPATIENT
Start: 2018-08-10 | End: 2019-09-10

## 2018-08-10 RX ORDER — FUROSEMIDE 40 MG/1
40 TABLET ORAL 2 TIMES DAILY
COMMUNITY
End: 2019-09-16 | Stop reason: HOSPADM

## 2018-08-10 RX ORDER — ALBUTEROL SULFATE 2.5 MG/3ML
2.5 SOLUTION RESPIRATORY (INHALATION) ONCE
Status: COMPLETED | OUTPATIENT
Start: 2018-08-10 | End: 2018-08-10

## 2018-08-10 RX ORDER — GUAIFENESIN 600 MG/1
1200 TABLET, EXTENDED RELEASE ORAL 2 TIMES DAILY
COMMUNITY
End: 2019-09-10

## 2018-08-10 RX ORDER — METHYLPREDNISOLONE SODIUM SUCCINATE 125 MG/2ML
125 INJECTION, POWDER, LYOPHILIZED, FOR SOLUTION INTRAMUSCULAR; INTRAVENOUS ONCE
Status: COMPLETED | OUTPATIENT
Start: 2018-08-10 | End: 2018-08-10

## 2018-08-10 RX ORDER — IPRATROPIUM BROMIDE AND ALBUTEROL SULFATE 2.5; .5 MG/3ML; MG/3ML
3 SOLUTION RESPIRATORY (INHALATION) ONCE
Status: COMPLETED | OUTPATIENT
Start: 2018-08-10 | End: 2018-08-10

## 2018-08-10 RX ADMIN — ALBUTEROL SULFATE 2.5 MG: 2.5 SOLUTION RESPIRATORY (INHALATION) at 06:27

## 2018-08-10 RX ADMIN — IPRATROPIUM BROMIDE AND ALBUTEROL SULFATE 3 ML: 2.5; .5 SOLUTION RESPIRATORY (INHALATION) at 06:06

## 2018-08-10 RX ADMIN — METHYLPREDNISOLONE SODIUM SUCCINATE 125 MG: 125 INJECTION, POWDER, FOR SOLUTION INTRAMUSCULAR; INTRAVENOUS at 06:21

## 2018-08-10 NOTE — ED PROVIDER NOTES
"Subjective   76 y/o female with history of asthma and chronic lymphedema arrives via ems for evaluation of sob for one day. The patient notes that she lives with a roommate who she state \"wears something that causes my asthma to act up\" stating that she has \"asked to move\" but is unsure if her request will be granted. She notes a cough with production but states she is unsure of what color it is as she \"doesn't look.\" She denies PND or orthopnea, increased LE swelling, fevers, chills, falls, trauma, history of PE or DVT, history of CHF or weight gain, nausea, vomiting, diarrhea, cp, abdominal pain, medication changes, numbness, tingling, loss of sensation or other issues. Per EMS last breathing treatment around 4 am and patient has remained 98% on room air. She arrives in NAD, clear speech actually laying flat on her back on my arrival into the room.         Family, social and past history reviewed as below, prior documentation of H and Ps and other documentation are reviewed:    Past Medical History:  No date: Asthma  No date: Cellulitis  No date: Diabetes mellitus (CMS/[facility])  No date: Disease of thyroid gland  No date: Hypercholesteremia  No date: Hypertension  No date: Unsteady gait    Past Surgical History:  No date: ADENOIDECTOMY  No date: ANKLE SURGERY; Left  No date: CHOLECYSTECTOMY  No date: TONSILLECTOMY  No date: TUBAL ABDOMINAL LIGATION    Social History    Marital status:              Spouse name:                       Years of education:                 Number of children:               Occupational History    None on file    Social History Main Topics    Smoking status: Never Smoker                                                                Smokeless tobacco: Never Used                        Alcohol use: No              Drug use: No              Sexual activity: Defer                Other Topics            Concern    None on file    Social History Narrative    None on file        Family " history: reviewed and non contributory             Review of Systems   All other systems reviewed and are negative.      Past Medical History:   Diagnosis Date   • Asthma    • Cellulitis    • Diabetes mellitus (CMS/HCC)    • Disease of thyroid gland    • Hypercholesteremia    • Hypertension    • Unsteady gait        Allergies   Allergen Reactions   • Aminophylline Unknown (See Comments)   • Cephalexin Unknown (See Comments)   • Spiractone [Spironolactone] Nausea And Vomiting   • Sulfa Antibiotics Unknown (See Comments)   • Sulfacetamide Sodium Unknown (See Comments)   • Ampicillin Rash   • Penicillins Rash       Past Surgical History:   Procedure Laterality Date   • ADENOIDECTOMY     • ANKLE SURGERY Left    • CHOLECYSTECTOMY     • TONSILLECTOMY     • TUBAL ABDOMINAL LIGATION         History reviewed. No pertinent family history.    Social History     Social History   • Marital status:      Social History Main Topics   • Smoking status: Never Smoker   • Smokeless tobacco: Never Used   • Alcohol use No   • Drug use: No   • Sexual activity: Defer     Other Topics Concern   • Not on file           Objective   Physical Exam   Constitutional: She is oriented to person, place, and time. She appears well-developed and well-nourished.   HENT:   Head: Normocephalic.   Nose: Nose normal.   Eyes: Pupils are equal, round, and reactive to light. Conjunctivae and EOM are normal.   Neck: Normal range of motion. Neck supple. No JVD present.   Cardiovascular: Regular rhythm, normal heart sounds and intact distal pulses.    bradycardia   Pulmonary/Chest: Effort normal. She has wheezes.   Cough noted, no production in the room.    Abdominal: Soft. Bowel sounds are normal.   Musculoskeletal: She exhibits edema. She exhibits no tenderness or deformity.   Non pitting edema bilaterally, she is wearing compression devices.    Neurological: She is alert and oriented to person, place, and time.   Skin: Skin is warm. Capillary refill  takes less than 2 seconds.   Psychiatric: She has a normal mood and affect.   Vitals reviewed.      ECG 12 Lead    Date/Time: 8/10/2018 5:46 AM  Performed by: KRUNAL ABDUL  Authorized by: KRUNAL ABDUL   Interpreted by physician  Comparison: compared with previous ECG from 3/24/2018  Similar to previous ECG  Rhythm: sinus bradycardia  Rate: bradycardic  BPM: 54  QRS axis: left                   ED Course      XR Chest 1 View   ED Interpretation   Appears relatively unchanged from 3/24      Final Result   1. Cardiomegaly unchanged from prior exams. There is no acute   cardiopulmonary process.           This report was finalized on 08/10/2018 07:49 by Dr. Otilio Rutledge MD.        Labs Reviewed   CBC WITH AUTO DIFFERENTIAL - Abnormal; Notable for the following:        Result Value    RBC 3.97 (*)     Hematocrit 36.7 (*)     All other components within normal limits   BLOOD GAS, VENOUS - Abnormal; Notable for the following:     pH, Venous 7.474 (*)     HCO3, Venous 30.6 (*)     Base Excess, Venous 6.4 (*)     O2 Saturation, Venous 85.4 (*)     All other components within normal limits   BNP (IN-HOUSE) - Normal   CK MB - Normal    Narrative:     CKMB Index not indicated   TROPONIN (IN-HOUSE) - Normal   BASIC METABOLIC PANEL - Normal    Narrative:     The MDRD GFR formula is only valid for adults with stable renal function between ages 18 and 70.   RAINBOW DRAW    Narrative:     The following orders were created for panel order [facility] Draw.  Procedure                               Abnormality         Status                     ---------                               -----------         ------                     Light Blue Top[[id number]]                                   Final result               Green Top (Gel)[[id number]]                                  Final result               Lavender Top[[id number]]                                     Final result               Red Top[[id number]]                     "                      Final result                 Please view results for these tests on the individual orders.   BLOOD GAS, VENOUS   LIGHT BLUE TOP   GREEN TOP   LAVENDER TOP   RED TOP       Much improved, course breath sounds are heard but improve with cough. States she still feels \"a little tight\" will do another breathing treatment and re-check.                [facility]      Final diagnoses:   Upper respiratory tract infection, unspecified type   Mild persistent asthma with exacerbation            Tyrone Fitzpatrick MD  08/20/18 0749    "

## 2018-08-10 NOTE — ED PROVIDER NOTES
Subjective   History of Present Illness see Dr. Valdivia note for full history and physical.  This is an addendum.    Review of Systems    Past Medical History:   Diagnosis Date   • Asthma    • Cellulitis    • Diabetes mellitus (CMS/HCC)    • Disease of thyroid gland    • Hypercholesteremia    • Hypertension    • Unsteady gait        Allergies   Allergen Reactions   • Aminophylline Unknown (See Comments)   • Cephalexin Unknown (See Comments)   • Spiractone [Spironolactone] Nausea And Vomiting   • Sulfa Antibiotics Unknown (See Comments)   • Sulfacetamide Sodium Unknown (See Comments)   • Ampicillin Rash   • Penicillins Rash       Past Surgical History:   Procedure Laterality Date   • ADENOIDECTOMY     • ANKLE SURGERY Left    • CHOLECYSTECTOMY     • TONSILLECTOMY     • TUBAL ABDOMINAL LIGATION         History reviewed. No pertinent family history.    Social History     Social History   • Marital status:      Social History Main Topics   • Smoking status: Never Smoker   • Smokeless tobacco: Never Used   • Alcohol use No   • Drug use: No   • Sexual activity: Defer     Other Topics Concern   • Not on file           Objective   Physical Exam    Procedures           ED Course        Patient received albuterol and steroids here in the ER.  Patient improved with treatment and had sats of 99 and 100%.  Patient attributes all her symptoms to her roommate and thinks that she may be allergic to her perfume.  Patient had no fever or chest pain.  Patient states the main complaint is congestion.    Lab Results (last 24 hours)     Procedure Component Value Units Date/Time    BNP [[id number]]  (Normal) Collected:  08/10/18 0538    Specimen:  Blood Updated:  08/10/18 0608     proBNP 116.0 pg/mL     CBC Auto Differential [[id number]]  (Abnormal) Collected:  08/10/18 0538    Specimen:  Blood Updated:  08/10/18 0553     WBC 5.99 10*3/mm3      RBC 3.97 (L) 10*6/mm3      Hemoglobin 12.3 g/dL      Hematocrit 36.7 (L) %      MCV 92.4  fL      MCH 31.0 pg      MCHC 33.5 g/dL      RDW 13.3 %      RDW-SD 45.6 fl      MPV 10.8 fL      Platelets 203 10*3/mm3      Neutrophil % 48.1 %      Lymphocyte % 38.9 %      Monocyte % 9.7 %      Eosinophil % 2.2 %      Basophil % 0.8 %      Immature Grans % 0.3 %      Neutrophils, Absolute 2.88 10*3/mm3      Lymphocytes, Absolute 2.33 10*3/mm3      Monocytes, Absolute 0.58 10*3/mm3      Eosinophils, Absolute 0.13 10*3/mm3      Basophils, Absolute 0.05 10*3/mm3      Immature Grans, Absolute 0.02 10*3/mm3      nRBC 0.0 /100 WBC     CK-MB [[id number]]  (Normal) Collected:  08/10/18 0538    Specimen:  Blood Updated:  08/10/18 0608     CKMB 0.35 ng/mL     Narrative:       CKMB Index not indicated    Blood Gas, Venous [[id number]]  (Abnormal) Collected:  08/10/18 0555    Specimen:  Venous Blood Updated:  08/10/18 0604     Site OTHER     pH, Venous 7.474 (H) pH Units      pCO2, Venous 41.7 mm Hg      pO2, Venous 47.6 mm Hg      HCO3, Venous 30.6 (H) mmol/L      Base Excess, Venous 6.4 (H) mmol/L      O2 Saturation, Venous 85.4 (H) %      Temperature 37.0 C      Barometric Pressure for Blood Gas 750 mmHg      Modality Room Air     Ventilator Mode NA     Collected by [id number]    Troponin [[id number]]  (Normal) Collected:  08/10/18 0556    Specimen:  Blood Updated:  08/10/18 0623     Troponin I <0.012 ng/mL     Basic Metabolic Panel [[id number]]  (Normal) Collected:  08/10/18 0556    Specimen:  Blood Updated:  08/10/18 0612     Glucose 76 mg/dL      BUN 21 mg/dL      Creatinine 0.87 mg/dL      Sodium 141 mmol/L      Potassium 3.5 mmol/L      Chloride 103 mmol/L      CO2 30.0 mmol/L      Calcium 9.0 mg/dL      eGFR Non African Amer 63 mL/min/1.73      BUN/Creatinine Ratio 24.1     Anion Gap 8.0 mmol/L     Narrative:       The MDRD GFR formula is only valid for adults with stable renal function between ages 18 and 70.        Labs are unremarkable.  Chest x-ray showed no acute findings.  Patient did have the persistent right hilar  mass.  Patient knows about this and will follow up with pulmonary.  This is a chronic finding.  Patient will be discharged home with azithromycin, prednisone and Flonase to follow up with PCP.  Return for any chest pain, shortness of breath, fever or other concerns.  Patient's family will talk to the nursing home today about moving the patient to a new room.          MDM      Final diagnoses:   Upper respiratory tract infection, unspecified type   Mild persistent asthma with exacerbation            Catrachita Jimenez MD  08/10/18 2487

## 2018-11-12 ENCOUNTER — LAB REQUISITION (OUTPATIENT)
Dept: LAB | Facility: HOSPITAL | Age: 78
End: 2018-11-12

## 2018-11-12 DIAGNOSIS — Z00.00 ENCOUNTER FOR GENERAL ADULT MEDICAL EXAMINATION WITHOUT ABNORMAL FINDINGS: ICD-10-CM

## 2018-11-12 LAB
ANION GAP SERPL CALCULATED.3IONS-SCNC: 10 MMOL/L (ref 4–13)
BUN BLD-MCNC: 62 MG/DL (ref 5–21)
BUN/CREAT SERPL: 46.3 (ref 7–25)
CALCIUM SPEC-SCNC: 8.9 MG/DL (ref 8.4–10.4)
CHLORIDE SERPL-SCNC: 98 MMOL/L (ref 98–110)
CO2 SERPL-SCNC: 29 MMOL/L (ref 24–31)
CREAT BLD-MCNC: 1.34 MG/DL (ref 0.5–1.4)
GFR SERPL CREATININE-BSD FRML MDRD: 38 ML/MIN/1.73
GLUCOSE BLD-MCNC: 76 MG/DL (ref 70–100)
POTASSIUM BLD-SCNC: 3.9 MMOL/L (ref 3.5–5.3)
SODIUM BLD-SCNC: 137 MMOL/L (ref 135–145)

## 2018-11-12 PROCEDURE — 80048 BASIC METABOLIC PNL TOTAL CA: CPT | Performed by: NURSE PRACTITIONER

## 2018-11-12 PROCEDURE — 36415 COLL VENOUS BLD VENIPUNCTURE: CPT | Performed by: NURSE PRACTITIONER

## 2018-12-13 ENCOUNTER — LAB REQUISITION (OUTPATIENT)
Dept: LAB | Facility: HOSPITAL | Age: 78
End: 2018-12-13

## 2018-12-13 DIAGNOSIS — Z00.00 ENCOUNTER FOR GENERAL ADULT MEDICAL EXAMINATION WITHOUT ABNORMAL FINDINGS: ICD-10-CM

## 2018-12-13 LAB
ALBUMIN SERPL-MCNC: 3.2 G/DL (ref 3.5–5)
ALBUMIN/GLOB SERPL: 1.3 G/DL (ref 1.1–2.5)
ALP SERPL-CCNC: 43 U/L (ref 24–120)
ALT SERPL W P-5'-P-CCNC: 18 U/L (ref 0–54)
ANION GAP SERPL CALCULATED.3IONS-SCNC: 11 MMOL/L (ref 4–13)
AST SERPL-CCNC: 26 U/L (ref 7–45)
BASOPHILS # BLD AUTO: 0.03 10*3/MM3 (ref 0–0.2)
BASOPHILS NFR BLD AUTO: 0.2 % (ref 0–2)
BILIRUB SERPL-MCNC: 0.5 MG/DL (ref 0.1–1)
BUN BLD-MCNC: 85 MG/DL (ref 5–21)
BUN/CREAT SERPL: 52.5 (ref 7–25)
CALCIUM SPEC-SCNC: 9.1 MG/DL (ref 8.4–10.4)
CHLORIDE SERPL-SCNC: 96 MMOL/L (ref 98–110)
CO2 SERPL-SCNC: 27 MMOL/L (ref 24–31)
CREAT BLD-MCNC: 1.62 MG/DL (ref 0.5–1.4)
DEPRECATED RDW RBC AUTO: 50.5 FL (ref 40–54)
EOSINOPHIL # BLD AUTO: 0.02 10*3/MM3 (ref 0–0.7)
EOSINOPHIL NFR BLD AUTO: 0.1 % (ref 0–4)
ERYTHROCYTE [DISTWIDTH] IN BLOOD BY AUTOMATED COUNT: 14.2 % (ref 12–15)
GFR SERPL CREATININE-BSD FRML MDRD: 31 ML/MIN/1.73
GLOBULIN UR ELPH-MCNC: 2.5 GM/DL
GLUCOSE BLD-MCNC: 100 MG/DL (ref 70–100)
HCT VFR BLD AUTO: 36.5 % (ref 37–47)
HGB BLD-MCNC: 12.5 G/DL (ref 12–16)
IMM GRANULOCYTES # BLD: 0.29 10*3/MM3 (ref 0–0.03)
IMM GRANULOCYTES NFR BLD: 2.2 % (ref 0–5)
LYMPHOCYTES # BLD AUTO: 1.08 10*3/MM3 (ref 0.72–4.86)
LYMPHOCYTES NFR BLD AUTO: 8.1 % (ref 15–45)
MCH RBC QN AUTO: 33.2 PG (ref 28–32)
MCHC RBC AUTO-ENTMCNC: 34.2 G/DL (ref 33–36)
MCV RBC AUTO: 97.1 FL (ref 82–98)
MONOCYTES # BLD AUTO: 0.79 10*3/MM3 (ref 0.19–1.3)
MONOCYTES NFR BLD AUTO: 5.9 % (ref 4–12)
NEUTROPHILS # BLD AUTO: 11.15 10*3/MM3 (ref 1.87–8.4)
NEUTROPHILS NFR BLD AUTO: 83.5 % (ref 39–78)
NRBC BLD MANUAL-RTO: 0 /100 WBC (ref 0–0)
NT-PROBNP SERPL-MCNC: 230 PG/ML (ref 0–1800)
PLATELET # BLD AUTO: 183 10*3/MM3 (ref 130–400)
PMV BLD AUTO: 10.2 FL (ref 6–12)
POTASSIUM BLD-SCNC: 4.8 MMOL/L (ref 3.5–5.3)
PROT SERPL-MCNC: 5.7 G/DL (ref 6.3–8.7)
RBC # BLD AUTO: 3.76 10*6/MM3 (ref 4.2–5.4)
SODIUM BLD-SCNC: 134 MMOL/L (ref 135–145)
WBC NRBC COR # BLD: 13.36 10*3/MM3 (ref 4.8–10.8)

## 2018-12-13 PROCEDURE — 83880 ASSAY OF NATRIURETIC PEPTIDE: CPT | Performed by: NURSE PRACTITIONER

## 2018-12-13 PROCEDURE — 80053 COMPREHEN METABOLIC PANEL: CPT | Performed by: NURSE PRACTITIONER

## 2018-12-13 PROCEDURE — 85025 COMPLETE CBC W/AUTO DIFF WBC: CPT | Performed by: NURSE PRACTITIONER

## 2018-12-18 ENCOUNTER — LAB REQUISITION (OUTPATIENT)
Dept: LAB | Facility: HOSPITAL | Age: 78
End: 2018-12-18

## 2018-12-18 DIAGNOSIS — Z00.00 ENCOUNTER FOR GENERAL ADULT MEDICAL EXAMINATION WITHOUT ABNORMAL FINDINGS: ICD-10-CM

## 2018-12-18 LAB
ANION GAP SERPL CALCULATED.3IONS-SCNC: 11 MMOL/L (ref 4–13)
BASOPHILS # BLD AUTO: 0.06 10*3/MM3 (ref 0–0.2)
BASOPHILS NFR BLD AUTO: 0.5 % (ref 0–2)
BUN BLD-MCNC: 51 MG/DL (ref 5–21)
BUN/CREAT SERPL: 38.3 (ref 7–25)
CALCIUM SPEC-SCNC: 8.8 MG/DL (ref 8.4–10.4)
CHLORIDE SERPL-SCNC: 96 MMOL/L (ref 98–110)
CO2 SERPL-SCNC: 28 MMOL/L (ref 24–31)
CREAT BLD-MCNC: 1.33 MG/DL (ref 0.5–1.4)
DEPRECATED RDW RBC AUTO: 46.6 FL (ref 40–54)
EOSINOPHIL # BLD AUTO: 0.01 10*3/MM3 (ref 0–0.7)
EOSINOPHIL NFR BLD AUTO: 0.1 % (ref 0–4)
ERYTHROCYTE [DISTWIDTH] IN BLOOD BY AUTOMATED COUNT: 13.3 % (ref 12–15)
GFR SERPL CREATININE-BSD FRML MDRD: 38 ML/MIN/1.73
GLUCOSE BLD-MCNC: 86 MG/DL (ref 70–100)
HCT VFR BLD AUTO: 35.3 % (ref 37–47)
HGB BLD-MCNC: 12.2 G/DL (ref 12–16)
IMM GRANULOCYTES # BLD: 0.52 10*3/MM3 (ref 0–0.03)
IMM GRANULOCYTES NFR BLD: 4.3 % (ref 0–5)
LYMPHOCYTES # BLD AUTO: 1.75 10*3/MM3 (ref 0.72–4.86)
LYMPHOCYTES NFR BLD AUTO: 14.4 % (ref 15–45)
MCH RBC QN AUTO: 32.9 PG (ref 28–32)
MCHC RBC AUTO-ENTMCNC: 34.6 G/DL (ref 33–36)
MCV RBC AUTO: 95.1 FL (ref 82–98)
MONOCYTES # BLD AUTO: 0.81 10*3/MM3 (ref 0.19–1.3)
MONOCYTES NFR BLD AUTO: 6.7 % (ref 4–12)
NEUTROPHILS # BLD AUTO: 9.03 10*3/MM3 (ref 1.87–8.4)
NEUTROPHILS NFR BLD AUTO: 74 % (ref 39–78)
NRBC BLD MANUAL-RTO: 0.2 /100 WBC (ref 0–0)
PLATELET # BLD AUTO: 200 10*3/MM3 (ref 130–400)
PMV BLD AUTO: 10.1 FL (ref 6–12)
POTASSIUM BLD-SCNC: 4 MMOL/L (ref 3.5–5.3)
RBC # BLD AUTO: 3.71 10*6/MM3 (ref 4.2–5.4)
SODIUM BLD-SCNC: 135 MMOL/L (ref 135–145)
WBC NRBC COR # BLD: 12.18 10*3/MM3 (ref 4.8–10.8)

## 2018-12-18 PROCEDURE — 36415 COLL VENOUS BLD VENIPUNCTURE: CPT | Performed by: NURSE PRACTITIONER

## 2018-12-18 PROCEDURE — 80048 BASIC METABOLIC PNL TOTAL CA: CPT | Performed by: NURSE PRACTITIONER

## 2018-12-18 PROCEDURE — 85025 COMPLETE CBC W/AUTO DIFF WBC: CPT | Performed by: NURSE PRACTITIONER

## 2019-01-05 ENCOUNTER — LAB REQUISITION (OUTPATIENT)
Dept: LAB | Facility: HOSPITAL | Age: 79
End: 2019-01-05

## 2019-01-05 DIAGNOSIS — Z00.00 ENCOUNTER FOR GENERAL ADULT MEDICAL EXAMINATION WITHOUT ABNORMAL FINDINGS: ICD-10-CM

## 2019-01-05 LAB
BASOPHILS # BLD AUTO: 0.01 10*3/MM3 (ref 0–0.2)
BASOPHILS NFR BLD AUTO: 0.1 % (ref 0–2)
DEPRECATED RDW RBC AUTO: 53.9 FL (ref 40–54)
EOSINOPHIL # BLD AUTO: 0.02 10*3/MM3 (ref 0–0.7)
EOSINOPHIL NFR BLD AUTO: 0.2 % (ref 0–4)
ERYTHROCYTE [DISTWIDTH] IN BLOOD BY AUTOMATED COUNT: 15 % (ref 12–15)
HCT VFR BLD AUTO: 36.6 % (ref 37–47)
HGB BLD-MCNC: 12.3 G/DL (ref 12–16)
IMM GRANULOCYTES # BLD AUTO: 0.1 10*3/MM3 (ref 0–0.03)
IMM GRANULOCYTES NFR BLD AUTO: 1.2 % (ref 0–5)
LYMPHOCYTES # BLD AUTO: 1.39 10*3/MM3 (ref 0.72–4.86)
LYMPHOCYTES NFR BLD AUTO: 16.6 % (ref 15–45)
MCH RBC QN AUTO: 33.5 PG (ref 28–32)
MCHC RBC AUTO-ENTMCNC: 33.6 G/DL (ref 33–36)
MCV RBC AUTO: 99.7 FL (ref 82–98)
MONOCYTES # BLD AUTO: 0.57 10*3/MM3 (ref 0.19–1.3)
MONOCYTES NFR BLD AUTO: 6.8 % (ref 4–12)
NEUTROPHILS # BLD AUTO: 6.26 10*3/MM3 (ref 1.87–8.4)
NEUTROPHILS NFR BLD AUTO: 75.1 % (ref 39–78)
NRBC BLD AUTO-RTO: 0.4 /100 WBC (ref 0–0)
PLATELET # BLD AUTO: 170 10*3/MM3 (ref 130–400)
PMV BLD AUTO: 9.9 FL (ref 6–12)
RBC # BLD AUTO: 3.67 10*6/MM3 (ref 4.2–5.4)
WBC NRBC COR # BLD: 8.35 10*3/MM3 (ref 4.8–10.8)

## 2019-01-05 PROCEDURE — 85025 COMPLETE CBC W/AUTO DIFF WBC: CPT | Performed by: NURSE PRACTITIONER

## 2019-01-29 ENCOUNTER — HOSPITAL ENCOUNTER (EMERGENCY)
Facility: HOSPITAL | Age: 79
Discharge: HOME | End: 2019-01-30
Admitting: EMERGENCY MEDICINE

## 2019-01-29 ENCOUNTER — APPOINTMENT (OUTPATIENT)
Dept: GENERAL RADIOLOGY | Facility: HOSPITAL | Age: 79
End: 2019-01-29
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
## 2019-01-29 DIAGNOSIS — S81.812A LACERATION OF LEFT LOWER EXTREMITY, INITIAL ENCOUNTER: ICD-10-CM

## 2019-01-29 DIAGNOSIS — M79.605 PAIN IN BOTH LOWER EXTREMITIES: ICD-10-CM

## 2019-01-29 DIAGNOSIS — M79.604 PAIN IN BOTH LOWER EXTREMITIES: ICD-10-CM

## 2019-01-29 PROCEDURE — 25010000002 TDAP 5-2.5-18.5 LF-MCG/0.5 SUSPENSION: Performed by: PHYSICIAN ASSISTANT

## 2019-01-29 PROCEDURE — 73562 X-RAY EXAM OF KNEE 3: CPT

## 2019-01-29 PROCEDURE — 73560 X-RAY EXAM OF KNEE 1 OR 2: CPT

## 2019-01-29 PROCEDURE — 73523 X-RAY EXAM HIPS BI 5/> VIEWS: CPT

## 2019-01-29 PROCEDURE — 99284 EMERGENCY DEPT VISIT MOD MDM: CPT

## 2019-01-29 PROCEDURE — 73590 X-RAY EXAM OF LOWER LEG: CPT

## 2019-01-29 PROCEDURE — 90471 IMMUNIZATION ADMIN: CPT | Performed by: PHYSICIAN ASSISTANT

## 2019-01-29 PROCEDURE — 90715 TDAP VACCINE 7 YRS/> IM: CPT | Performed by: PHYSICIAN ASSISTANT

## 2019-01-29 RX ORDER — TRAMADOL HYDROCHLORIDE 50 MG/1
25 TABLET ORAL ONCE
Status: COMPLETED | OUTPATIENT
Start: 2019-01-29 | End: 2019-01-29

## 2019-01-29 RX ORDER — LIDOCAINE HYDROCHLORIDE 20 MG/ML
10 INJECTION, SOLUTION INFILTRATION; PERINEURAL ONCE
Status: COMPLETED | OUTPATIENT
Start: 2019-01-29 | End: 2019-01-29

## 2019-01-29 RX ADMIN — TETANUS TOXOID, REDUCED DIPHTHERIA TOXOID AND ACELLULAR PERTUSSIS VACCINE, ADSORBED 0.5 ML: 5; 2.5; 8; 8; 2.5 SUSPENSION INTRAMUSCULAR at 22:08

## 2019-01-29 RX ADMIN — LIDOCAINE HYDROCHLORIDE 10 ML: 20 INJECTION, SOLUTION INFILTRATION; PERINEURAL at 22:17

## 2019-01-29 RX ADMIN — TRAMADOL HYDROCHLORIDE 25 MG: 50 TABLET, COATED ORAL at 22:21

## 2019-01-29 RX ADMIN — LIDOCAINE HYDROCHLORIDE: 20 JELLY TOPICAL at 22:22

## 2019-01-30 ENCOUNTER — APPOINTMENT (OUTPATIENT)
Dept: CT IMAGING | Facility: HOSPITAL | Age: 79
End: 2019-01-30

## 2019-01-30 VITALS
WEIGHT: 227.1 LBS | TEMPERATURE: 97.8 F | SYSTOLIC BLOOD PRESSURE: 136 MMHG | RESPIRATION RATE: 18 BRPM | BODY MASS INDEX: 41.79 KG/M2 | HEART RATE: 65 BPM | HEIGHT: 62 IN | OXYGEN SATURATION: 95 % | DIASTOLIC BLOOD PRESSURE: 69 MMHG

## 2019-01-30 PROCEDURE — 72192 CT PELVIS W/O DYE: CPT

## 2019-01-30 RX ORDER — TRAMADOL HYDROCHLORIDE 50 MG/1
25 TABLET ORAL EVERY 6 HOURS PRN
Qty: 8 TABLET | Refills: 0 | Status: SHIPPED | OUTPATIENT
Start: 2019-01-30 | End: 2019-09-10

## 2019-02-15 ENCOUNTER — LAB REQUISITION (OUTPATIENT)
Dept: LAB | Facility: HOSPITAL | Age: 79
End: 2019-02-15

## 2019-02-15 DIAGNOSIS — Z00.00 ENCOUNTER FOR GENERAL ADULT MEDICAL EXAMINATION WITHOUT ABNORMAL FINDINGS: ICD-10-CM

## 2019-02-15 LAB
ALBUMIN SERPL-MCNC: 3.8 G/DL (ref 3.5–5)
ALBUMIN/GLOB SERPL: 1.7 G/DL (ref 1.1–2.5)
ALP SERPL-CCNC: 104 U/L (ref 24–120)
ALT SERPL W P-5'-P-CCNC: 47 U/L (ref 0–54)
ANION GAP SERPL CALCULATED.3IONS-SCNC: 7 MMOL/L (ref 4–13)
AST SERPL-CCNC: 40 U/L (ref 7–45)
BILIRUB SERPL-MCNC: 0.4 MG/DL (ref 0.1–1)
BUN BLD-MCNC: 37 MG/DL (ref 5–21)
BUN/CREAT SERPL: 38.1 (ref 7–25)
CALCIUM SPEC-SCNC: 9.4 MG/DL (ref 8.4–10.4)
CHLORIDE SERPL-SCNC: 94 MMOL/L (ref 98–110)
CO2 SERPL-SCNC: 32 MMOL/L (ref 24–31)
CREAT BLD-MCNC: 0.97 MG/DL (ref 0.5–1.4)
GFR SERPL CREATININE-BSD FRML MDRD: 55 ML/MIN/1.73
GLOBULIN UR ELPH-MCNC: 2.2 GM/DL
GLUCOSE BLD-MCNC: 150 MG/DL (ref 70–100)
POTASSIUM BLD-SCNC: 4.2 MMOL/L (ref 3.5–5.3)
PROT SERPL-MCNC: 6 G/DL (ref 6.3–8.7)
SODIUM BLD-SCNC: 133 MMOL/L (ref 135–145)

## 2019-02-15 PROCEDURE — 80053 COMPREHEN METABOLIC PANEL: CPT | Performed by: NURSE PRACTITIONER

## 2019-03-18 ENCOUNTER — HOSPITAL ENCOUNTER (OUTPATIENT)
Dept: WOUND CARE | Age: 79
Discharge: HOME | End: 2019-03-18
Payer: MEDICARE

## 2019-03-18 VITALS
HEART RATE: 78 BPM | TEMPERATURE: 97.5 F | DIASTOLIC BLOOD PRESSURE: 78 MMHG | RESPIRATION RATE: 18 BRPM | SYSTOLIC BLOOD PRESSURE: 121 MMHG

## 2019-03-18 DIAGNOSIS — E66.01 MORBID OBESITY (HCC): ICD-10-CM

## 2019-03-18 DIAGNOSIS — I89.0 LYMPHEDEMA: ICD-10-CM

## 2019-03-18 DIAGNOSIS — L97.922 NON-PRESSURE CHRONIC ULCER OF LEFT LOWER LEG, WITH FAT LAYER EXPOSED (HCC): ICD-10-CM

## 2019-03-18 PROCEDURE — 99204 OFFICE O/P NEW MOD 45 MIN: CPT | Performed by: NURSE PRACTITIONER

## 2019-03-18 PROCEDURE — 99214 OFFICE O/P EST MOD 30 MIN: CPT

## 2019-03-18 RX ORDER — CETIRIZINE HYDROCHLORIDE 10 MG/1
10 TABLET ORAL
COMMUNITY

## 2019-03-18 RX ORDER — ALBUTEROL SULFATE 90 UG/1
2 AEROSOL, METERED RESPIRATORY (INHALATION)
COMMUNITY

## 2019-03-18 RX ORDER — MONTELUKAST SODIUM 10 MG/1
1 TABLET ORAL DAILY
COMMUNITY

## 2019-03-18 RX ORDER — PREDNISONE 20 MG/1
40 TABLET ORAL
COMMUNITY
Start: 2018-08-10

## 2019-03-18 RX ORDER — RANITIDINE 150 MG/1
150 TABLET ORAL
COMMUNITY
End: 2019-05-23 | Stop reason: ALTCHOICE

## 2019-03-18 RX ORDER — TRAMADOL HYDROCHLORIDE 50 MG/1
25 TABLET ORAL EVERY 6 HOURS PRN
COMMUNITY
Start: 2019-01-30 | End: 2020-10-27 | Stop reason: ALTCHOICE

## 2019-03-18 RX ORDER — LEVOTHYROXINE SODIUM 0.12 MG/1
125 TABLET ORAL
COMMUNITY

## 2019-03-18 RX ORDER — ONDANSETRON 4 MG/1
4 TABLET, FILM COATED ORAL
COMMUNITY
Start: 2017-04-20

## 2019-03-18 RX ORDER — FUROSEMIDE 40 MG/1
40 TABLET ORAL
COMMUNITY

## 2019-03-18 RX ORDER — ALBUTEROL SULFATE 2.5 MG/3ML
3 SOLUTION RESPIRATORY (INHALATION)
COMMUNITY
Start: 2015-10-21

## 2019-03-18 RX ORDER — PRIMIDONE 50 MG/1
12.25 TABLET ORAL NIGHTLY
COMMUNITY

## 2019-03-18 RX ORDER — SPIRONOLACTONE 25 MG/1
50 TABLET ORAL DAILY
COMMUNITY
End: 2020-10-27 | Stop reason: ALTCHOICE

## 2019-03-18 RX ORDER — DOCUSATE SODIUM 100 MG/1
100 CAPSULE, LIQUID FILLED ORAL
COMMUNITY
End: 2019-05-23 | Stop reason: ALTCHOICE

## 2019-03-18 RX ORDER — BUMETANIDE 1 MG/1
2 TABLET ORAL
COMMUNITY
Start: 2018-05-17

## 2019-03-18 RX ORDER — POTASSIUM CHLORIDE 750 MG/1
20 CAPSULE, EXTENDED RELEASE ORAL DAILY
COMMUNITY

## 2019-03-18 SDOH — HEALTH STABILITY: MENTAL HEALTH: HOW OFTEN DO YOU HAVE A DRINK CONTAINING ALCOHOL?: NEVER

## 2019-03-20 ENCOUNTER — LAB REQUISITION (OUTPATIENT)
Dept: LAB | Facility: HOSPITAL | Age: 79
End: 2019-03-20

## 2019-03-20 DIAGNOSIS — Z00.00 ENCOUNTER FOR GENERAL ADULT MEDICAL EXAMINATION WITHOUT ABNORMAL FINDINGS: ICD-10-CM

## 2019-03-20 LAB
BASOPHILS # BLD AUTO: 0.04 10*3/MM3 (ref 0–0.2)
BASOPHILS NFR BLD AUTO: 0.4 % (ref 0–2)
DEPRECATED RDW RBC AUTO: 52.6 FL (ref 40–54)
EOSINOPHIL # BLD AUTO: 0.04 10*3/MM3 (ref 0–0.7)
EOSINOPHIL NFR BLD AUTO: 0.4 % (ref 0–4)
ERYTHROCYTE [DISTWIDTH] IN BLOOD BY AUTOMATED COUNT: 13.7 % (ref 12–15)
HCT VFR BLD AUTO: 42.8 % (ref 37–47)
HGB BLD-MCNC: 13.9 G/DL (ref 12–16)
IMM GRANULOCYTES # BLD AUTO: 0.2 10*3/MM3 (ref 0–0.05)
IMM GRANULOCYTES NFR BLD AUTO: 1.9 % (ref 0–5)
LYMPHOCYTES # BLD AUTO: 2.21 10*3/MM3 (ref 0.72–4.86)
LYMPHOCYTES NFR BLD AUTO: 21.1 % (ref 15–45)
MCH RBC QN AUTO: 33.4 PG (ref 28–32)
MCHC RBC AUTO-ENTMCNC: 32.5 G/DL (ref 33–36)
MCV RBC AUTO: 102.9 FL (ref 82–98)
MONOCYTES # BLD AUTO: 0.68 10*3/MM3 (ref 0.19–1.3)
MONOCYTES NFR BLD AUTO: 6.5 % (ref 4–12)
NEUTROPHILS # BLD AUTO: 7.31 10*3/MM3 (ref 1.87–8.4)
NEUTROPHILS NFR BLD AUTO: 69.7 % (ref 39–78)
NRBC BLD AUTO-RTO: 0 /100 WBC (ref 0–0)
PLATELET # BLD AUTO: 170 10*3/MM3 (ref 130–400)
PMV BLD AUTO: 10 FL (ref 6–12)
RBC # BLD AUTO: 4.16 10*6/MM3 (ref 4.2–5.4)
WBC NRBC COR # BLD: 10.48 10*3/MM3 (ref 4.8–10.8)

## 2019-03-20 PROCEDURE — 85025 COMPLETE CBC W/AUTO DIFF WBC: CPT | Performed by: NURSE PRACTITIONER

## 2019-03-20 PROCEDURE — 36415 COLL VENOUS BLD VENIPUNCTURE: CPT | Performed by: NURSE PRACTITIONER

## 2019-04-02 ENCOUNTER — HOSPITAL ENCOUNTER (OUTPATIENT)
Dept: WOUND CARE | Age: 79
Discharge: HOME | End: 2019-04-02
Payer: MEDICARE

## 2019-04-02 ENCOUNTER — HOSPITAL ENCOUNTER (OUTPATIENT)
Dept: NON INVASIVE DIAGNOSTICS | Age: 79
Discharge: HOME | End: 2019-04-02
Payer: MEDICARE

## 2019-04-02 ENCOUNTER — HOSPITAL ENCOUNTER (OUTPATIENT)
Dept: GENERAL RADIOLOGY | Age: 79
Discharge: HOME | End: 2019-04-02
Payer: MEDICARE

## 2019-04-02 VITALS
WEIGHT: 190 LBS | HEART RATE: 62 BPM | RESPIRATION RATE: 20 BRPM | DIASTOLIC BLOOD PRESSURE: 97 MMHG | TEMPERATURE: 98.1 F | HEIGHT: 59 IN | SYSTOLIC BLOOD PRESSURE: 141 MMHG | BODY MASS INDEX: 38.3 KG/M2

## 2019-04-02 DIAGNOSIS — I87.2 VENOUS STASIS DERMATITIS OF BOTH LOWER EXTREMITIES: Chronic | ICD-10-CM

## 2019-04-02 DIAGNOSIS — L97.922 NON-PRESSURE CHRONIC ULCER OF LEFT LOWER LEG, WITH FAT LAYER EXPOSED (HCC): ICD-10-CM

## 2019-04-02 DIAGNOSIS — I89.0 LYMPHEDEMA: ICD-10-CM

## 2019-04-02 DIAGNOSIS — E66.01 MORBID OBESITY (HCC): ICD-10-CM

## 2019-04-02 PROCEDURE — 73560 X-RAY EXAM OF KNEE 1 OR 2: CPT

## 2019-04-02 PROCEDURE — 11042 DBRDMT SUBQ TIS 1ST 20SQCM/<: CPT | Performed by: SURGERY

## 2019-04-02 PROCEDURE — 11042 DBRDMT SUBQ TIS 1ST 20SQCM/<: CPT

## 2019-04-02 PROCEDURE — 93923 UPR/LXTR ART STDY 3+ LVLS: CPT

## 2019-04-02 NOTE — PLAN OF CARE
Problem: Wound:  Goal: Will show signs of wound healing; wound closure and no evidence of infection  Description  Will show signs of wound healing; wound closure and no evidence of infection  Outcome: Ongoing     Problem: Venous:  Goal: Signs of wound healing will improve  Description  Signs of wound healing will improve  Outcome: Ongoing     Problem: Compression therapy:  Goal: Will be free from complications associated with compression therapy  Description  Will be free from complications associated with compression therapy  Outcome: Ongoing

## 2019-04-16 ENCOUNTER — HOSPITAL ENCOUNTER (OUTPATIENT)
Dept: WOUND CARE | Age: 79
Discharge: HOME | End: 2019-04-16
Payer: MEDICARE

## 2019-04-16 VITALS
TEMPERATURE: 96.9 F | HEART RATE: 100 BPM | WEIGHT: 190 LBS | HEIGHT: 59 IN | BODY MASS INDEX: 38.3 KG/M2 | SYSTOLIC BLOOD PRESSURE: 136 MMHG | RESPIRATION RATE: 18 BRPM | DIASTOLIC BLOOD PRESSURE: 93 MMHG

## 2019-04-16 DIAGNOSIS — E66.01 MORBID OBESITY (HCC): ICD-10-CM

## 2019-04-16 DIAGNOSIS — I89.0 LYMPHEDEMA: ICD-10-CM

## 2019-04-16 DIAGNOSIS — L97.922 NON-PRESSURE CHRONIC ULCER OF LEFT LOWER LEG, WITH FAT LAYER EXPOSED (HCC): ICD-10-CM

## 2019-04-16 PROBLEM — E11.9 TYPE 2 DIABETES MELLITUS (HCC): Chronic | Status: ACTIVE | Noted: 2019-04-16

## 2019-04-16 PROBLEM — E11.9 TYPE 2 DIABETES MELLITUS (HCC): Chronic | Status: RESOLVED | Noted: 2019-04-16 | Resolved: 2019-04-16

## 2019-04-16 PROCEDURE — 11042 DBRDMT SUBQ TIS 1ST 20SQCM/<: CPT | Performed by: SURGERY

## 2019-04-16 PROCEDURE — 11042 DBRDMT SUBQ TIS 1ST 20SQCM/<: CPT

## 2019-04-16 ASSESSMENT — PAIN SCALES - GENERAL: PAINLEVEL_OUTOF10: 0

## 2019-04-16 NOTE — PROGRESS NOTES
Wound Care Center   Progress Note and Procedure Note      Valdo Galvan  AGE: 77 y.o. GENDER: female  : 1940  TODAY'S DATE:  2019    Subjective:        HISTORY of PRESENT ILLNESS HPI   Valdo Galvan is a 77 y.o. female who presents today for wound evaluation. Wound Type:venous and traumatic  Wound Location:left knee  Modifying factors:edema, venous stasis, lymphedema and obesity    Patient Active Problem List   Diagnosis Code    Non-pressure chronic ulcer of left lower leg, with fat layer exposed ([facility] [address] L97.922    Lymphedema I89.0    Morbid obesity (HCC) E66.01    Venous stasis dermatitis of both lower extremities I87.2       ALLERGIES    Allergies   Allergen Reactions    Aminophylline     Ampicillin     Cephalosporins     Ciprofloxacin     Erythromycin     Other      Sodium      Penicillamine     Penicillins     Spironolactone     Sulfa Antibiotics     Sulfacetamide             Objective:      BP (!) 136/93   Pulse 100   Temp 96.9 °F (36.1 °C) (Temporal)   Resp 18   Ht 4' 11\" (1.499 m)   Wt 190 lb (86.2 kg)   BMI 38.38 kg/m²     Post Debridement Measurements and Assessment:  Wound 19 Knee Left; Anterior wound 1 - left knee  (Active)   Wound Image   2019  1:44 PM   Wound Traumatic 2019  1:31 PM   Dressing Status Old drainage; Intact 2019  1:31 PM   Dressing Changed Changed/New 2019  3:00 PM   Dressing/Treatment Packing 3/18/2019 10:40 AM   Wound Cleansed Rinsed/Irrigated with saline 2019  1:31 PM   Wound Length (cm) 0.3 cm 2019  1:31 PM   Wound Width (cm) 0.9 cm 2019  1:31 PM   Wound Depth (cm) 1.5 cm 2019  1:31 PM   Wound Surface Area (cm^2) 0.27 cm^2 2019  1:31 PM   Change in Wound Size % (l*w) 91 2019  1:31 PM   Wound Volume (cm^3) 0.4 cm^3 2019  1:31 PM   Wound Healing % 91 2019  1:31 PM   Post-Procedure Length (cm) 0.3 cm 2019  1:47 PM   Post-Procedure Width (cm) 0.9 cm 2019  1:47 PM   Post-Procedure

## 2019-04-30 ENCOUNTER — HOSPITAL ENCOUNTER (OUTPATIENT)
Dept: WOUND CARE | Age: 79
Discharge: HOME | End: 2019-04-30
Payer: MEDICARE

## 2019-04-30 VITALS
DIASTOLIC BLOOD PRESSURE: 98 MMHG | WEIGHT: 190 LBS | TEMPERATURE: 98.6 F | HEART RATE: 119 BPM | HEIGHT: 59 IN | SYSTOLIC BLOOD PRESSURE: 141 MMHG | BODY MASS INDEX: 38.3 KG/M2 | RESPIRATION RATE: 18 BRPM

## 2019-04-30 DIAGNOSIS — E66.01 MORBID OBESITY (HCC): ICD-10-CM

## 2019-04-30 DIAGNOSIS — I89.0 LYMPHEDEMA: ICD-10-CM

## 2019-04-30 DIAGNOSIS — L97.922 NON-PRESSURE CHRONIC ULCER OF LEFT LOWER LEG, WITH FAT LAYER EXPOSED (HCC): ICD-10-CM

## 2019-04-30 PROCEDURE — 11042 DBRDMT SUBQ TIS 1ST 20SQCM/<: CPT | Performed by: SURGERY

## 2019-04-30 PROCEDURE — 11042 DBRDMT SUBQ TIS 1ST 20SQCM/<: CPT

## 2019-04-30 ASSESSMENT — PAIN SCALES - GENERAL: PAINLEVEL_OUTOF10: 0

## 2019-04-30 NOTE — PROGRESS NOTES
Wound Care Center   Progress Note and Procedure Note      Mihir Miller  AGE: 77 y.o. GENDER: female  : 1940  TODAY'S DATE:  2019    Subjective:        HISTORY of PRESENT ILLNESS HPI   Mihir Miller is a 77 y.o. female who presents today for wound/ulcer evaluation. Ulcer Type:traumatic  Ulcer/Wound Location:left lower leg  Contributing factors:edema, venous stasis, diabetes, shear force and obesity    Patient Active Problem List   Diagnosis Code    Non-pressure chronic ulcer of left lower leg, with fat layer exposed ([address] L97.922    Lymphedema I89.0    Morbid obesity ([facility]) E66.01    Venous stasis dermatitis of both lower extremities I87.2       ALLERGIES    Allergies   Allergen Reactions    Aminophylline     Ampicillin     Cephalosporins     Ciprofloxacin     Erythromycin     Other      Sodium      Penicillamine     Penicillins     Spironolactone     Sulfa Antibiotics     Sulfacetamide            Objective:      BP (!) 141/98   Pulse 119   Temp 98.6 °F (37 °C) (Temporal)   Resp 18   Ht 4' 11\" (1.499 m)   Wt 190 lb (86.2 kg)   BMI 38.38 kg/m²         Assessment:      Problem List Items Addressed This Visit     Non-pressure chronic ulcer of left lower leg, with fat layer exposed ([address]    Lymphedema    Morbid obesity ([address]          The patients pain isPain Level: 0  . Wound(s) is unchanged. Please refer to nursing measurements and assessment regarding wound pre and post debridement. Procedure Note  Indications:  Based on my examination of this patient's wound(s)/ulcer(s) today, debridement is required to promote healing and evaluate the wound base.     Performed by: Betsy Sutherland MD    Consent obtained:  Yes    Time out taken:  Yes    Pain Control: Anesthetic  Anesthetic: 2% Lidocaine Gel Topical       Debridement: Excisional Debridement    Using curette and scissors the wound(s)/ulcer(s) was/were sharply debrided down through and including the removal of epidermis, dermis and subcutaneous tissue. Devitalized Tissue Debrided:  fibrin, biofilm, slough and rolled edge    Pre Debridement Measurements:  Are located in the [facility]  Documentation Flow Sheet    Wound/Ulcer #: 1    Post Debridement Measurements:  Wound/Ulcer Descriptions are Pre Debridement except measurements:    Wound 03/18/19 Knee Left; Anterior wound 1 - left knee  (Active)   Wound Image   4/30/2019  2:12 PM   Wound Traumatic 4/30/2019  2:12 PM   Dressing Status Old drainage 4/30/2019  2:12 PM   Dressing Changed Changed/New 4/16/2019  2:18 PM   Dressing/Treatment Packing 3/18/2019 10:40 AM   Wound Cleansed Rinsed/Irrigated with saline 4/30/2019  2:12 PM   Wound Length (cm) 0.3 cm 4/30/2019  2:12 PM   Wound Width (cm) 0.7 cm 4/30/2019  2:12 PM   Wound Depth (cm) 1.5 cm 4/30/2019  2:12 PM   Wound Surface Area (cm^2) 0.21 cm^2 4/30/2019  2:12 PM   Change in Wound Size % (l*w) 93 4/30/2019  2:12 PM   Wound Volume (cm^3) 0.32 cm^3 4/30/2019  2:12 PM   Wound Healing % 93 4/30/2019  2:12 PM   Post-Procedure Length (cm) 0.3 cm 4/30/2019  2:47 PM   Post-Procedure Width (cm) 0.9 cm 4/30/2019  2:47 PM   Post-Procedure Depth (cm) 1.5 cm 4/30/2019  2:47 PM   Post-Procedure Surface Area (cm^2) 0.27 cm^2 4/30/2019  2:47 PM   Post-Procedure Volume (cm^3) 0.4 cm^3 4/30/2019  2:47 PM   Distance Tunneling (cm) 0 cm 4/30/2019  2:12 PM   Tunneling Position ___ O'Clock 0 4/30/2019  2:12 PM   Undermining Starts ___ O'Clock 0 4/30/2019  2:12 PM   Undermining Ends___ O'Clock 0 4/30/2019  2:12 PM   Undermining Maxium Distance (cm) 0 4/30/2019  2:12 PM   Wound Assessment Granulation tissue;Pink;Red;Slough; Yellow 4/30/2019  2:12 PM   Drainage Amount Moderate 4/30/2019  2:12 PM   Drainage Description Serosanguinous 4/30/2019  2:12 PM   Odor None 4/30/2019  2:12 PM   Margins Attached edges;Epibole (rolled edges) 4/30/2019  2:12 PM   Exposed structure Fascia 4/30/2019  2:12 PM   Anisha-wound Assessment Dry; Intact; Hyperpigmented 4/30/2019 2:12 PM   Non-staged Wound Description Full thickness 4/30/2019  2:12 PM   Branford%Wound Bed 75 4/30/2019  2:12 PM   Red%Wound Bed 25 4/30/2019  2:12 PM   Yellow%Wound Bed 0 4/30/2019  2:12 PM   Black%Wound Bed 0 4/30/2019  2:12 PM   Purple%Wound Bed 0 4/30/2019  2:12 PM   Other%Wound Bed 0 3/18/2019 10:40 AM   Culture Taken No 3/18/2019 10:40 AM   Number of days: 43          Percent of Wound(s)/Ulcer(s) Debrided: 100%    Total Surface Area Debrided:  0.21 sq cm     Diabetic/Pressure/Non Pressure Ulcers only:  Ulcer: Other: traumatic     Estimated Blood Loss:  Minimal    Hemostasis Achieved:  not needed    Response to treatment:  Well tolerated by patient. Plan:          Plan for wound - Dress per physician order  Treatment:     Compression : No   Offloading : Yes   Dressing : see AVS   Additional Therapy : none     1. Discussed appropriate home care of this wound. Wound redressed. 2. Written patient dismissal instructions given to patient and signed by patient or POA. 3. Follow up: 2 week(s). Ms. Mike Mccloud wound is not much changed. It has developed another rolled edge, this was removed with scissors. The depth is the same. I discussed with the patient and her daughter, and will plan to try, a wound vac. We also discussed not wearing the little compression sleeve she usually wears on her lower leg because it seems to end right below the wound and is likely putting pressure on the area. We also discussed increasing protein. We will see about having the nursing home give her Ross, and the patient's daughter is going to see about getting her some protein shakes. Finally, we also talked about elevating and trying to decrease bending the knee as able so that there is less shearing. Follow up in 2 weeks.      Electronically signed by Julia Dinero MD on 4/30/2019 at 2:54 PM

## 2019-05-08 ENCOUNTER — LAB REQUISITION (OUTPATIENT)
Dept: LAB | Facility: HOSPITAL | Age: 79
End: 2019-05-08

## 2019-05-08 DIAGNOSIS — Z00.00 ENCOUNTER FOR GENERAL ADULT MEDICAL EXAMINATION WITHOUT ABNORMAL FINDINGS: ICD-10-CM

## 2019-05-08 LAB
ALBUMIN SERPL-MCNC: 3.3 G/DL (ref 3.5–5)
ALBUMIN/GLOB SERPL: 1.5 G/DL (ref 1.1–2.5)
ALP SERPL-CCNC: 78 U/L (ref 24–120)
ALT SERPL W P-5'-P-CCNC: 28 U/L (ref 0–54)
ANION GAP SERPL CALCULATED.3IONS-SCNC: 7 MMOL/L (ref 4–13)
AST SERPL-CCNC: 22 U/L (ref 7–45)
BASOPHILS # BLD AUTO: 0.06 10*3/MM3 (ref 0–0.2)
BASOPHILS NFR BLD AUTO: 0.6 % (ref 0–2)
BILIRUB SERPL-MCNC: 0.5 MG/DL (ref 0.1–1)
BUN BLD-MCNC: 42 MG/DL (ref 5–21)
BUN/CREAT SERPL: 46.2 (ref 7–25)
CALCIUM SPEC-SCNC: 9 MG/DL (ref 8.4–10.4)
CHLORIDE SERPL-SCNC: 99 MMOL/L (ref 98–110)
CO2 SERPL-SCNC: 32 MMOL/L (ref 24–31)
CREAT BLD-MCNC: 0.91 MG/DL (ref 0.5–1.4)
DEPRECATED RDW RBC AUTO: 54.2 FL (ref 40–54)
EOSINOPHIL # BLD AUTO: 0.07 10*3/MM3 (ref 0–0.7)
EOSINOPHIL NFR BLD AUTO: 0.7 % (ref 0–4)
ERYTHROCYTE [DISTWIDTH] IN BLOOD BY AUTOMATED COUNT: 14.3 % (ref 12–15)
GFR SERPL CREATININE-BSD FRML MDRD: 60 ML/MIN/1.73
GLOBULIN UR ELPH-MCNC: 2.2 GM/DL
GLUCOSE BLD-MCNC: 88 MG/DL (ref 70–100)
HCT VFR BLD AUTO: 46.1 % (ref 37–47)
HGB BLD-MCNC: 15 G/DL (ref 12–16)
IMM GRANULOCYTES # BLD AUTO: 0.22 10*3/MM3 (ref 0–0.05)
IMM GRANULOCYTES NFR BLD AUTO: 2.1 % (ref 0–5)
LYMPHOCYTES # BLD AUTO: 1.9 10*3/MM3 (ref 0.72–4.86)
LYMPHOCYTES NFR BLD AUTO: 18.4 % (ref 15–45)
MCH RBC QN AUTO: 33.1 PG (ref 28–32)
MCHC RBC AUTO-ENTMCNC: 32.5 G/DL (ref 33–36)
MCV RBC AUTO: 101.8 FL (ref 82–98)
MONOCYTES # BLD AUTO: 0.7 10*3/MM3 (ref 0.19–1.3)
MONOCYTES NFR BLD AUTO: 6.8 % (ref 4–12)
NEUTROPHILS # BLD AUTO: 7.39 10*3/MM3 (ref 1.87–8.4)
NEUTROPHILS NFR BLD AUTO: 71.4 % (ref 39–78)
NRBC BLD AUTO-RTO: 0.2 /100 WBC (ref 0–0.2)
PLATELET # BLD AUTO: 168 10*3/MM3 (ref 130–400)
PMV BLD AUTO: 10.4 FL (ref 6–12)
POTASSIUM BLD-SCNC: 3.6 MMOL/L (ref 3.5–5.3)
PROT SERPL-MCNC: 5.5 G/DL (ref 6.3–8.7)
RBC # BLD AUTO: 4.53 10*6/MM3 (ref 4.2–5.4)
SODIUM BLD-SCNC: 138 MMOL/L (ref 135–145)
WBC NRBC COR # BLD: 10.34 10*3/MM3 (ref 4.8–10.8)

## 2019-05-08 PROCEDURE — 36415 COLL VENOUS BLD VENIPUNCTURE: CPT | Performed by: FAMILY MEDICINE

## 2019-05-08 PROCEDURE — 86038 ANTINUCLEAR ANTIBODIES: CPT | Performed by: FAMILY MEDICINE

## 2019-05-08 PROCEDURE — 80053 COMPREHEN METABOLIC PANEL: CPT | Performed by: FAMILY MEDICINE

## 2019-05-08 PROCEDURE — 85025 COMPLETE CBC W/AUTO DIFF WBC: CPT | Performed by: FAMILY MEDICINE

## 2019-05-09 LAB
ANA SER QL IA: NEGATIVE
Lab: NORMAL

## 2019-05-23 ENCOUNTER — HOSPITAL ENCOUNTER (OUTPATIENT)
Dept: WOUND CARE | Age: 79
Discharge: HOME | End: 2019-05-23
Payer: MEDICARE

## 2019-05-23 VITALS
DIASTOLIC BLOOD PRESSURE: 84 MMHG | RESPIRATION RATE: 24 BRPM | SYSTOLIC BLOOD PRESSURE: 132 MMHG | BODY MASS INDEX: 38.3 KG/M2 | HEIGHT: 59 IN | WEIGHT: 190 LBS | TEMPERATURE: 97.6 F | HEART RATE: 87 BPM

## 2019-05-23 DIAGNOSIS — E66.01 MORBID OBESITY (HCC): ICD-10-CM

## 2019-05-23 DIAGNOSIS — I89.0 LYMPHEDEMA: ICD-10-CM

## 2019-05-23 DIAGNOSIS — L97.922 NON-PRESSURE CHRONIC ULCER OF LEFT LOWER LEG, WITH FAT LAYER EXPOSED (HCC): ICD-10-CM

## 2019-05-23 PROCEDURE — 11042 DBRDMT SUBQ TIS 1ST 20SQCM/<: CPT | Performed by: SURGERY

## 2019-05-23 PROCEDURE — 11042 DBRDMT SUBQ TIS 1ST 20SQCM/<: CPT

## 2019-05-23 RX ORDER — OMEPRAZOLE 20 MG/1
20 CAPSULE, DELAYED RELEASE ORAL DAILY
COMMUNITY
End: 2020-10-27 | Stop reason: ALTCHOICE

## 2019-05-23 ASSESSMENT — PAIN SCALES - GENERAL: PAINLEVEL_OUTOF10: 0

## 2019-05-23 NOTE — PROGRESS NOTES
[facility]   Progress Note and Procedure Note      Antonio Benítez  AGE: 77 y.o. GENDER: female  : 1940  TODAY'S DATE:  2019    Subjective:        HISTORY of PRESENT ILLNESS HPI   Antonio Benítez is a 77 y.o. female who presents today for wound/ulcer evaluation. Ulcer Type:venous and traumatic  Ulcer/Wound Location:left lower leg  Contributing factors:edema, venous stasis, lymphedema, diabetes, decreased mobility and obesity    Patient Active Problem List   Diagnosis Code    Non-pressure chronic ulcer of left lower leg, with fat layer exposed ([address] L97.922    Lymphedema I89.0    Morbid obesity (HCC) E66.01    Venous stasis dermatitis of both lower extremities I87.2       ALLERGIES    Allergies   Allergen Reactions    Aminophylline     Ampicillin     Cephalosporins     Ciprofloxacin     Erythromycin     Keflex [Cephalexin]     Other      Sodium      Penicillamine     Penicillins     Spironolactone     Sulfa Antibiotics     Sulfacetamide            Objective:      /84   Pulse 87   Temp 97.6 °F (36.4 °C)   Resp 24   Ht 4' 11\" (1.499 m)   Wt 190 lb (86.2 kg)   BMI 38.38 kg/m²         Assessment:      Problem List Items Addressed This Visit     Non-pressure chronic ulcer of left lower leg, with fat layer exposed ([address]    Lymphedema    Morbid obesity ([address]          The patients pain isPain Level: 0  . Wound(s) has moderately improved. Please refer to nursing measurements and assessment regarding wound pre and post debridement. Wound 19 Knee Left; Anterior wound 1 - left knee  (Active)   Wound Image    2019  3:47 PM   Wound Traumatic 2019  3:47 PM   Dressing Status Old drainage 2019  3:47 PM   Dressing Changed Changed/New 2019  2:18 PM   Dressing/Treatment Packing 3/18/2019 10:40 AM   Wound Cleansed Rinsed/Irrigated with saline 2019  2:12 PM   Wound Length (cm) 0.2 cm 2019  3:47 PM   Wound Width (cm) 0.3 cm 2019  3:47 PM Wound Depth (cm) 0.7 cm 5/23/2019  3:47 PM   Wound Surface Area (cm^2) 0.06 cm^2 5/23/2019  3:47 PM   Change in Wound Size % (l*w) 98 5/23/2019  3:47 PM   Wound Volume (cm^3) 0.04 cm^3 5/23/2019  3:47 PM   Wound Healing % 99 5/23/2019  3:47 PM   Post-Procedure Length (cm) 0.3 cm 4/30/2019  2:47 PM   Post-Procedure Width (cm) 0.9 cm 4/30/2019  2:47 PM   Post-Procedure Depth (cm) 1.5 cm 4/30/2019  2:47 PM   Post-Procedure Surface Area (cm^2) 0.27 cm^2 4/30/2019  2:47 PM   Post-Procedure Volume (cm^3) 0.4 cm^3 4/30/2019  2:47 PM   Distance Tunneling (cm) 0 cm 5/23/2019  3:47 PM   Tunneling Position ___ O'Clock 0 5/23/2019  3:47 PM   Undermining Starts ___ O'Clock 0 5/23/2019  3:47 PM   Undermining Ends___ O'Clock 0 5/23/2019  3:47 PM   Undermining Maxium Distance (cm) 0 5/23/2019  3:47 PM   Wound Assessment Pink;Slough; Yellow;Drainage;Granulation tissue 5/23/2019  3:47 PM   Drainage Amount Small 5/23/2019  3:47 PM   Drainage Description Serosanguinous 5/23/2019  3:47 PM   Odor None 5/23/2019  3:47 PM   Margins Attached edges 5/23/2019  3:47 PM   Exposed structure Fascia 4/30/2019  2:12 PM   Anisha-wound Assessment Dry; Intact 5/23/2019  3:47 PM   Non-staged Wound Description Full thickness 5/23/2019  3:47 PM   Morehouse%Wound Bed 0 5/23/2019  3:47 PM   Red%Wound Bed 95 5/23/2019  3:47 PM   Yellow%Wound Bed 5 5/23/2019  3:47 PM   Black%Wound Bed 0 5/23/2019  3:47 PM   Purple%Wound Bed 0 5/23/2019  3:47 PM   Other%Wound Bed 0 5/23/2019  3:47 PM   Culture Taken No 3/18/2019 10:40 AM   Number of days: 66           Procedure Note  Indications:  Based on my examination of this patient's wound(s)/ulcer(s) today, debridement is required to promote healing and evaluate the wound base.     Performed by: Alix Kerns MD    Consent obtained:  Yes    Time out taken:  Yes    Pain Control:         Debridement: Excisional Debridement    Using curette the wound(s)/ulcer(s) was/were sharply debrided down through and including the removal of epidermis, dermis and subcutaneous tissue. Devitalized Tissue Debrided:  fibrin, biofilm and slough    Pre Debridement Measurements:  Are located in the Wound/Ulcer Documentation Flow Sheet    Wound/Ulcer #: 1    Post Debridement Measurements:  Wound/Ulcer Descriptions are Pre Debridement except measurements:          Percent of Wound(s)/Ulcer(s) Debrided: 100%    Total Surface Area Debrided:  0.27 sq cm     Diabetic/Pressure/Non Pressure Ulcers only:  Ulcer: Non-Pressure ulcer, fat layer exposed     Estimated Blood Loss:  Minimal    Hemostasis Achieved:  not needed    Response to treatment:  Well tolerated by patient. Plan:          Plan for wound - Dress per physician order  Treatment:     Compression : No   Offloading : Yes   Dressing : see AVS   Additional Therapy : none     1. Discussed appropriate home care of this wound. Wound redressed. 2. Written patient dismissal instructions given to patient and signed by patient or POA. 3. Follow up: 2 week(s). Ms. Franklin Maldonado wounds improved with the wound vac- it is half as deep as it was. They were not able to get packing down in to it at the [facility], so the stopped the wound vac this past week. We will switch to promogran and nuguaze with dakins. Follow upin two weeks.      Electronically signed by Eduardo Sánchez MD on 5/23/2019 at 4:10 PM

## 2019-06-13 ENCOUNTER — HOSPITAL ENCOUNTER (OUTPATIENT)
Dept: WOUND CARE | Age: 79
Discharge: HOME | End: 2019-06-13

## 2019-07-12 ENCOUNTER — LAB REQUISITION (OUTPATIENT)
Dept: LAB | Facility: HOSPITAL | Age: 79
End: 2019-07-12

## 2019-07-12 DIAGNOSIS — Z00.00 ENCOUNTER FOR GENERAL ADULT MEDICAL EXAMINATION WITHOUT ABNORMAL FINDINGS: ICD-10-CM

## 2019-07-12 LAB — TSH SERPL DL<=0.05 MIU/L-ACNC: 0.52 MIU/ML (ref 0.47–4.68)

## 2019-07-12 PROCEDURE — 84443 ASSAY THYROID STIM HORMONE: CPT | Performed by: FAMILY MEDICINE

## 2019-07-12 PROCEDURE — 36415 COLL VENOUS BLD VENIPUNCTURE: CPT | Performed by: FAMILY MEDICINE

## 2019-09-10 ENCOUNTER — APPOINTMENT (OUTPATIENT)
Dept: GENERAL RADIOLOGY | Facility: HOSPITAL | Age: 79
End: 2019-09-10

## 2019-09-10 ENCOUNTER — APPOINTMENT (OUTPATIENT)
Dept: CT IMAGING | Facility: HOSPITAL | Age: 79
End: 2019-09-10

## 2019-09-10 ENCOUNTER — HOSPITAL ENCOUNTER (INPATIENT)
Facility: HOSPITAL | Age: 79
LOS: 3 days | Discharge: NURSING, REHAB OR LONG-TERM CARE | End: 2019-09-16
Attending: EMERGENCY MEDICINE | Admitting: FAMILY MEDICINE

## 2019-09-10 DIAGNOSIS — J20.9 ACUTE BRONCHITIS, UNSPECIFIED ORGANISM: ICD-10-CM

## 2019-09-10 DIAGNOSIS — J45.41 MODERATE PERSISTENT ASTHMA WITH ACUTE EXACERBATION: Primary | ICD-10-CM

## 2019-09-10 LAB
ALBUMIN SERPL-MCNC: 3.4 G/DL (ref 3.5–5.2)
ALBUMIN/GLOB SERPL: 1.4 G/DL
ALP SERPL-CCNC: 118 U/L (ref 39–117)
ALT SERPL W P-5'-P-CCNC: 42 U/L (ref 1–33)
ANION GAP SERPL CALCULATED.3IONS-SCNC: 11 MMOL/L (ref 5–15)
APTT PPP: 22.4 SECONDS (ref 24.1–35)
ARTERIAL PATENCY WRIST A: ABNORMAL
AST SERPL-CCNC: 27 U/L (ref 1–32)
ATMOSPHERIC PRESS: 755 MMHG
BASE EXCESS BLDA CALC-SCNC: 5.1 MMOL/L (ref 0–2)
BASOPHILS # BLD AUTO: 0.05 10*3/MM3 (ref 0–0.2)
BASOPHILS NFR BLD AUTO: 0.4 % (ref 0–1.5)
BDY SITE: ABNORMAL
BILIRUB SERPL-MCNC: 0.6 MG/DL (ref 0.2–1.2)
BODY TEMPERATURE: 37 C
BUN BLD-MCNC: 35 MG/DL (ref 8–23)
BUN/CREAT SERPL: 37.2 (ref 7–25)
CALCIUM SPEC-SCNC: 9.2 MG/DL (ref 8.6–10.5)
CHLORIDE SERPL-SCNC: 99 MMOL/L (ref 98–107)
CO2 SERPL-SCNC: 29 MMOL/L (ref 22–29)
CREAT BLD-MCNC: 0.94 MG/DL (ref 0.57–1)
D DIMER PPP FEU-MCNC: 3.83 MG/L (FEU) (ref 0–0.5)
D-LACTATE SERPL-SCNC: 3.1 MMOL/L (ref 0.5–2)
D-LACTATE SERPL-SCNC: 5.4 MMOL/L (ref 0.5–2)
DEPRECATED RDW RBC AUTO: 53.9 FL (ref 37–54)
EOSINOPHIL # BLD AUTO: 0.01 10*3/MM3 (ref 0–0.4)
EOSINOPHIL NFR BLD AUTO: 0.1 % (ref 0.3–6.2)
ERYTHROCYTE [DISTWIDTH] IN BLOOD BY AUTOMATED COUNT: 14.6 % (ref 12.3–15.4)
GFR SERPL CREATININE-BSD FRML MDRD: 57 ML/MIN/1.73
GLOBULIN UR ELPH-MCNC: 2.4 GM/DL
GLUCOSE BLD-MCNC: 215 MG/DL (ref 65–99)
GLUCOSE BLDC GLUCOMTR-MCNC: 300 MG/DL (ref 70–130)
HCO3 BLDA-SCNC: 28.4 MMOL/L (ref 20–26)
HCT VFR BLD AUTO: 47.8 % (ref 34–46.6)
HGB BLD-MCNC: 16.1 G/DL (ref 12–15.9)
HOLD SPECIMEN: NORMAL
HOROWITZ INDEX BLD+IHG-RTO: 21 %
IMM GRANULOCYTES # BLD AUTO: 0.2 10*3/MM3 (ref 0–0.05)
IMM GRANULOCYTES NFR BLD AUTO: 1.5 % (ref 0–0.5)
INR PPP: 0.86 (ref 0.91–1.09)
LYMPHOCYTES # BLD AUTO: 1.16 10*3/MM3 (ref 0.7–3.1)
LYMPHOCYTES NFR BLD AUTO: 9 % (ref 19.6–45.3)
Lab: ABNORMAL
MCH RBC QN AUTO: 33.5 PG (ref 26.6–33)
MCHC RBC AUTO-ENTMCNC: 33.7 G/DL (ref 31.5–35.7)
MCV RBC AUTO: 99.4 FL (ref 79–97)
MODALITY: ABNORMAL
MONOCYTES # BLD AUTO: 0.53 10*3/MM3 (ref 0.1–0.9)
MONOCYTES NFR BLD AUTO: 4.1 % (ref 5–12)
NEUTROPHILS # BLD AUTO: 10.99 10*3/MM3 (ref 1.7–7)
NEUTROPHILS NFR BLD AUTO: 84.9 % (ref 42.7–76)
NRBC BLD AUTO-RTO: 0.2 /100 WBC (ref 0–0.2)
NT-PROBNP SERPL-MCNC: 128.1 PG/ML (ref 5–1800)
PCO2 BLDA: 36.9 MM HG (ref 35–45)
PH BLDA: 7.5 PH UNITS (ref 7.35–7.45)
PLATELET # BLD AUTO: 140 10*3/MM3 (ref 140–450)
PMV BLD AUTO: 10.1 FL (ref 6–12)
PO2 BLDA: 72.5 MM HG (ref 83–108)
POTASSIUM BLD-SCNC: 4.1 MMOL/L (ref 3.5–5.2)
PROT SERPL-MCNC: 5.8 G/DL (ref 6–8.5)
PROTHROMBIN TIME: 12 SECONDS (ref 11.9–14.6)
RBC # BLD AUTO: 4.81 10*6/MM3 (ref 3.77–5.28)
SAO2 % BLDCOA: 96.5 % (ref 94–99)
SODIUM BLD-SCNC: 139 MMOL/L (ref 136–145)
TROPONIN T SERPL-MCNC: 0.01 NG/ML (ref 0–0.03)
VENTILATOR MODE: ABNORMAL
WBC NRBC COR # BLD: 12.94 10*3/MM3 (ref 3.4–10.8)
WHOLE BLOOD HOLD SPECIMEN: NORMAL

## 2019-09-10 PROCEDURE — 93010 ELECTROCARDIOGRAM REPORT: CPT | Performed by: INTERNAL MEDICINE

## 2019-09-10 PROCEDURE — 71046 X-RAY EXAM CHEST 2 VIEWS: CPT

## 2019-09-10 PROCEDURE — 25010000002 METHYLPREDNISOLONE PER 125 MG: Performed by: EMERGENCY MEDICINE

## 2019-09-10 PROCEDURE — 85730 THROMBOPLASTIN TIME PARTIAL: CPT | Performed by: EMERGENCY MEDICINE

## 2019-09-10 PROCEDURE — 93005 ELECTROCARDIOGRAM TRACING: CPT | Performed by: EMERGENCY MEDICINE

## 2019-09-10 PROCEDURE — 25010000002 ONDANSETRON PER 1 MG: Performed by: EMERGENCY MEDICINE

## 2019-09-10 PROCEDURE — G0378 HOSPITAL OBSERVATION PER HR: HCPCS

## 2019-09-10 PROCEDURE — 80053 COMPREHEN METABOLIC PANEL: CPT | Performed by: EMERGENCY MEDICINE

## 2019-09-10 PROCEDURE — 71275 CT ANGIOGRAPHY CHEST: CPT

## 2019-09-10 PROCEDURE — 82803 BLOOD GASES ANY COMBINATION: CPT

## 2019-09-10 PROCEDURE — 87040 BLOOD CULTURE FOR BACTERIA: CPT | Performed by: EMERGENCY MEDICINE

## 2019-09-10 PROCEDURE — 85025 COMPLETE CBC W/AUTO DIFF WBC: CPT | Performed by: EMERGENCY MEDICINE

## 2019-09-10 PROCEDURE — 36600 WITHDRAWAL OF ARTERIAL BLOOD: CPT

## 2019-09-10 PROCEDURE — 83880 ASSAY OF NATRIURETIC PEPTIDE: CPT | Performed by: EMERGENCY MEDICINE

## 2019-09-10 PROCEDURE — 87147 CULTURE TYPE IMMUNOLOGIC: CPT | Performed by: EMERGENCY MEDICINE

## 2019-09-10 PROCEDURE — 94799 UNLISTED PULMONARY SVC/PX: CPT

## 2019-09-10 PROCEDURE — 25010000002 ENOXAPARIN PER 10 MG: Performed by: NURSE PRACTITIONER

## 2019-09-10 PROCEDURE — 82962 GLUCOSE BLOOD TEST: CPT

## 2019-09-10 PROCEDURE — 94760 N-INVAS EAR/PLS OXIMETRY 1: CPT

## 2019-09-10 PROCEDURE — 71045 X-RAY EXAM CHEST 1 VIEW: CPT

## 2019-09-10 PROCEDURE — 85379 FIBRIN DEGRADATION QUANT: CPT | Performed by: EMERGENCY MEDICINE

## 2019-09-10 PROCEDURE — 63710000001 INSULIN LISPRO (HUMAN) PER 5 UNITS: Performed by: NURSE PRACTITIONER

## 2019-09-10 PROCEDURE — 0 IOPAMIDOL PER 1 ML: Performed by: EMERGENCY MEDICINE

## 2019-09-10 PROCEDURE — 87150 DNA/RNA AMPLIFIED PROBE: CPT | Performed by: EMERGENCY MEDICINE

## 2019-09-10 PROCEDURE — 94640 AIRWAY INHALATION TREATMENT: CPT

## 2019-09-10 PROCEDURE — 25010000002 LEVOFLOXACIN PER 250 MG: Performed by: EMERGENCY MEDICINE

## 2019-09-10 PROCEDURE — 36415 COLL VENOUS BLD VENIPUNCTURE: CPT | Performed by: EMERGENCY MEDICINE

## 2019-09-10 PROCEDURE — 83605 ASSAY OF LACTIC ACID: CPT | Performed by: EMERGENCY MEDICINE

## 2019-09-10 PROCEDURE — 85610 PROTHROMBIN TIME: CPT | Performed by: EMERGENCY MEDICINE

## 2019-09-10 PROCEDURE — 99284 EMERGENCY DEPT VISIT MOD MDM: CPT

## 2019-09-10 PROCEDURE — 84484 ASSAY OF TROPONIN QUANT: CPT | Performed by: EMERGENCY MEDICINE

## 2019-09-10 PROCEDURE — 25010000002 METHYLPREDNISOLONE PER 125 MG: Performed by: NURSE PRACTITIONER

## 2019-09-10 RX ORDER — ONDANSETRON 2 MG/ML
4 INJECTION INTRAMUSCULAR; INTRAVENOUS ONCE
Status: COMPLETED | OUTPATIENT
Start: 2019-09-10 | End: 2019-09-10

## 2019-09-10 RX ORDER — DEXTROSE MONOHYDRATE 25 G/50ML
25 INJECTION, SOLUTION INTRAVENOUS
Status: DISCONTINUED | OUTPATIENT
Start: 2019-09-10 | End: 2019-09-16 | Stop reason: HOSPADM

## 2019-09-10 RX ORDER — BUMETANIDE 2 MG/1
2 TABLET ORAL 2 TIMES DAILY
Status: DISCONTINUED | OUTPATIENT
Start: 2019-09-10 | End: 2019-09-16 | Stop reason: HOSPADM

## 2019-09-10 RX ORDER — MAGNESIUM HYDROXIDE/ALUMINUM HYDROXICE/SIMETHICONE 120; 1200; 1200 MG/30ML; MG/30ML; MG/30ML
30 SUSPENSION ORAL EVERY 6 HOURS PRN
COMMUNITY
End: 2019-09-16 | Stop reason: HOSPADM

## 2019-09-10 RX ORDER — MULTIVITAMIN
1 TABLET ORAL DAILY
COMMUNITY

## 2019-09-10 RX ORDER — SPIRONOLACTONE 50 MG/1
50 TABLET, FILM COATED ORAL DAILY
Status: DISCONTINUED | OUTPATIENT
Start: 2019-09-11 | End: 2019-09-16 | Stop reason: HOSPADM

## 2019-09-10 RX ORDER — METHYLPREDNISOLONE SODIUM SUCCINATE 125 MG/2ML
80 INJECTION, POWDER, LYOPHILIZED, FOR SOLUTION INTRAMUSCULAR; INTRAVENOUS EVERY 12 HOURS
Status: DISCONTINUED | OUTPATIENT
Start: 2019-09-10 | End: 2019-09-12

## 2019-09-10 RX ORDER — SENNOSIDES 8.6 MG
2 TABLET ORAL DAILY
Status: DISCONTINUED | OUTPATIENT
Start: 2019-09-11 | End: 2019-09-16 | Stop reason: HOSPADM

## 2019-09-10 RX ORDER — SODIUM CHLORIDE 0.9 % (FLUSH) 0.9 %
10 SYRINGE (ML) INJECTION AS NEEDED
Status: DISCONTINUED | OUTPATIENT
Start: 2019-09-10 | End: 2019-09-16 | Stop reason: HOSPADM

## 2019-09-10 RX ORDER — NICOTINE POLACRILEX 4 MG
15 LOZENGE BUCCAL
Status: DISCONTINUED | OUTPATIENT
Start: 2019-09-10 | End: 2019-09-16 | Stop reason: HOSPADM

## 2019-09-10 RX ORDER — POTASSIUM CHLORIDE 750 MG/1
10 CAPSULE, EXTENDED RELEASE ORAL 2 TIMES DAILY WITH MEALS
Status: DISCONTINUED | OUTPATIENT
Start: 2019-09-10 | End: 2019-09-16 | Stop reason: HOSPADM

## 2019-09-10 RX ORDER — ONDANSETRON 4 MG/1
4 TABLET, FILM COATED ORAL EVERY 8 HOURS PRN
COMMUNITY

## 2019-09-10 RX ORDER — ALBUTEROL SULFATE 2.5 MG/3ML
2.5 SOLUTION RESPIRATORY (INHALATION)
Status: COMPLETED | OUTPATIENT
Start: 2019-09-10 | End: 2019-09-10

## 2019-09-10 RX ORDER — POLYETHYLENE GLYCOL 3350 17 G/17G
17 POWDER, FOR SOLUTION ORAL DAILY
Status: DISCONTINUED | OUTPATIENT
Start: 2019-09-11 | End: 2019-09-16 | Stop reason: HOSPADM

## 2019-09-10 RX ORDER — SENNOSIDES 8.6 MG
2 TABLET ORAL DAILY
COMMUNITY
End: 2019-09-16 | Stop reason: HOSPADM

## 2019-09-10 RX ORDER — LEVOTHYROXINE SODIUM 0.12 MG/1
125 TABLET ORAL
Status: DISCONTINUED | OUTPATIENT
Start: 2019-09-11 | End: 2019-09-16 | Stop reason: HOSPADM

## 2019-09-10 RX ORDER — IPRATROPIUM BROMIDE AND ALBUTEROL SULFATE 2.5; .5 MG/3ML; MG/3ML
3 SOLUTION RESPIRATORY (INHALATION)
Status: DISCONTINUED | OUTPATIENT
Start: 2019-09-10 | End: 2019-09-11

## 2019-09-10 RX ORDER — SODIUM CHLORIDE 9 MG/ML
50 INJECTION, SOLUTION INTRAVENOUS CONTINUOUS
Status: DISCONTINUED | OUTPATIENT
Start: 2019-09-10 | End: 2019-09-11

## 2019-09-10 RX ORDER — PRIMIDONE 50 MG/1
12.5 TABLET ORAL NIGHTLY
COMMUNITY

## 2019-09-10 RX ORDER — MONTELUKAST SODIUM 10 MG/1
10 TABLET ORAL NIGHTLY
Status: DISCONTINUED | OUTPATIENT
Start: 2019-09-10 | End: 2019-09-16 | Stop reason: HOSPADM

## 2019-09-10 RX ORDER — LEVOFLOXACIN 5 MG/ML
500 INJECTION, SOLUTION INTRAVENOUS EVERY 24 HOURS
Status: DISCONTINUED | OUTPATIENT
Start: 2019-09-11 | End: 2019-09-12

## 2019-09-10 RX ORDER — IPRATROPIUM BROMIDE AND ALBUTEROL SULFATE 2.5; .5 MG/3ML; MG/3ML
3 SOLUTION RESPIRATORY (INHALATION) ONCE
Status: COMPLETED | OUTPATIENT
Start: 2019-09-10 | End: 2019-09-10

## 2019-09-10 RX ORDER — SODIUM CHLORIDE 0.9 % (FLUSH) 0.9 %
10 SYRINGE (ML) INJECTION EVERY 12 HOURS SCHEDULED
Status: DISCONTINUED | OUTPATIENT
Start: 2019-09-10 | End: 2019-09-16 | Stop reason: HOSPADM

## 2019-09-10 RX ORDER — NYSTATIN 100000 [USP'U]/G
1 POWDER TOPICAL 3 TIMES DAILY
Status: DISCONTINUED | OUTPATIENT
Start: 2019-09-10 | End: 2019-09-16 | Stop reason: HOSPADM

## 2019-09-10 RX ORDER — NYSTATIN 100000 [USP'U]/G
1 POWDER TOPICAL 3 TIMES DAILY
COMMUNITY

## 2019-09-10 RX ORDER — LEVOFLOXACIN 5 MG/ML
750 INJECTION, SOLUTION INTRAVENOUS ONCE
Status: COMPLETED | OUTPATIENT
Start: 2019-09-10 | End: 2019-09-10

## 2019-09-10 RX ORDER — PRIMIDONE 50 MG/1
12.5 TABLET ORAL NIGHTLY
Status: DISCONTINUED | OUTPATIENT
Start: 2019-09-10 | End: 2019-09-16 | Stop reason: HOSPADM

## 2019-09-10 RX ORDER — BUMETANIDE 2 MG/1
2 TABLET ORAL 2 TIMES DAILY
COMMUNITY

## 2019-09-10 RX ORDER — METHYLPREDNISOLONE SODIUM SUCCINATE 125 MG/2ML
125 INJECTION, POWDER, LYOPHILIZED, FOR SOLUTION INTRAMUSCULAR; INTRAVENOUS ONCE
Status: COMPLETED | OUTPATIENT
Start: 2019-09-10 | End: 2019-09-10

## 2019-09-10 RX ORDER — FLUCONAZOLE 150 MG/1
150 TABLET ORAL ONCE
COMMUNITY
End: 2019-09-10

## 2019-09-10 RX ORDER — OMEPRAZOLE 20 MG/1
20 CAPSULE, DELAYED RELEASE ORAL DAILY
COMMUNITY

## 2019-09-10 RX ORDER — LANSOPRAZOLE
30 KIT
Status: DISCONTINUED | OUTPATIENT
Start: 2019-09-11 | End: 2019-09-16 | Stop reason: HOSPADM

## 2019-09-10 RX ADMIN — IOPAMIDOL 150 ML: 755 INJECTION, SOLUTION INTRAVENOUS at 15:39

## 2019-09-10 RX ADMIN — MONTELUKAST SODIUM 10 MG: 10 TABLET, FILM COATED ORAL at 22:31

## 2019-09-10 RX ADMIN — ALBUTEROL SULFATE 2.5 MG: 2.5 SOLUTION RESPIRATORY (INHALATION) at 11:50

## 2019-09-10 RX ADMIN — NYSTATIN 1 APPLICATION: 100000 POWDER TOPICAL at 22:32

## 2019-09-10 RX ADMIN — LEVOFLOXACIN 750 MG: 5 INJECTION, SOLUTION INTRAVENOUS at 11:56

## 2019-09-10 RX ADMIN — ALBUTEROL SULFATE 2.5 MG: 2.5 SOLUTION RESPIRATORY (INHALATION) at 12:10

## 2019-09-10 RX ADMIN — METHYLPREDNISOLONE SODIUM SUCCINATE 80 MG: 125 INJECTION, POWDER, FOR SOLUTION INTRAMUSCULAR; INTRAVENOUS at 22:31

## 2019-09-10 RX ADMIN — ENOXAPARIN SODIUM 40 MG: 40 INJECTION SUBCUTANEOUS at 22:31

## 2019-09-10 RX ADMIN — IPRATROPIUM BROMIDE AND ALBUTEROL SULFATE 3 ML: 2.5; .5 SOLUTION RESPIRATORY (INHALATION) at 22:34

## 2019-09-10 RX ADMIN — ONDANSETRON HYDROCHLORIDE 4 MG: 2 SOLUTION INTRAMUSCULAR; INTRAVENOUS at 13:27

## 2019-09-10 RX ADMIN — SODIUM CHLORIDE, PRESERVATIVE FREE 10 ML: 5 INJECTION INTRAVENOUS at 22:32

## 2019-09-10 RX ADMIN — INSULIN LISPRO 5 UNITS: 100 INJECTION, SOLUTION INTRAVENOUS; SUBCUTANEOUS at 23:06

## 2019-09-10 RX ADMIN — METHYLPREDNISOLONE SODIUM SUCCINATE 125 MG: 125 INJECTION, POWDER, FOR SOLUTION INTRAMUSCULAR; INTRAVENOUS at 11:56

## 2019-09-10 RX ADMIN — POTASSIUM CHLORIDE 10 MEQ: 10 CAPSULE, COATED, EXTENDED RELEASE ORAL at 22:31

## 2019-09-10 RX ADMIN — PRIMIDONE 12.5 MG: 50 TABLET ORAL at 22:32

## 2019-09-10 RX ADMIN — IPRATROPIUM BROMIDE AND ALBUTEROL SULFATE 3 ML: 2.5; .5 SOLUTION RESPIRATORY (INHALATION) at 12:02

## 2019-09-10 NOTE — PLAN OF CARE
"Problem: Patient Care Overview  Goal: Plan of Care Review  Outcome: Ongoing (interventions implemented as appropriate)   09/10/19 7440   Coping/Psychosocial   Plan of Care Reviewed With patient   OTHER   Outcome Summary Pt admitted from ER to room 484, VSS, on 2 L/NC, AAO, BLE very weak, pt states she is \"mostly bed confined\", eythema noted to cristian/gluteal area, denies pain, generalzied bruising to bilat arms and legs, generalized edema also noted         "

## 2019-09-10 NOTE — ED PROVIDER NOTES
Subjective   Shortness of breath and cough and wheezing        Shortness of Breath   Severity:  Moderate  Onset quality:  Gradual  Timing:  Constant  Progression:  Worsening  Chronicity:  Recurrent  Context: not activity, not animal exposure, not emotional upset, not occupational exposure, not pollens, not URI and not weather changes    Relieved by:  Nothing  Worsened by:  Exertion  Associated symptoms: vomiting and wheezing    Associated symptoms: no abdominal pain, no chest pain, no cough, no diaphoresis, no headaches, no hemoptysis, no neck pain, no sputum production and no syncope    Risk factors: obesity    Risk factors: no family hx of DVT and no hx of cancer        Review of Systems   Constitutional: Negative.  Negative for diaphoresis.   HENT: Negative.    Eyes: Negative.    Respiratory: Positive for shortness of breath and wheezing. Negative for cough, hemoptysis and sputum production.    Cardiovascular: Negative.  Negative for chest pain and syncope.   Gastrointestinal: Positive for vomiting. Negative for abdominal pain.   Musculoskeletal: Negative.  Negative for back pain and neck pain.   Skin: Negative.    Neurological: Negative.  Negative for headaches.   All other systems reviewed and are negative.      Past Medical History:   Diagnosis Date   • Asthma    • Cellulitis    • CHF (congestive heart failure) (CMS/HCC)    • Diabetes mellitus (CMS/HCC)    • Disease of thyroid gland    • Hypercholesteremia    • Hypertension    • Hypokalemia    • Kidney cysts    • Sleep apnea    • Unsteady gait        Allergies   Allergen Reactions   • Aminophylline Unknown (See Comments)   • Cephalexin Unknown (See Comments)   • Spiractone [Spironolactone] Nausea And Vomiting   • Sulfa Antibiotics Unknown (See Comments)   • Sulfacetamide Sodium Unknown (See Comments)   • Ampicillin Rash   • Penicillins Rash       Past Surgical History:   Procedure Laterality Date   • ADENOIDECTOMY     • ANKLE SURGERY Left    • CHOLECYSTECTOMY      • TONSILLECTOMY     • TUBAL ABDOMINAL LIGATION         History reviewed. No pertinent family history.    Social History     Socioeconomic History   • Marital status:      Spouse name: Not on file   • Number of children: Not on file   • Years of education: Not on file   • Highest education level: Not on file   Tobacco Use   • Smoking status: Never Smoker   • Smokeless tobacco: Never Used   Substance and Sexual Activity   • Alcohol use: No   • Drug use: No   • Sexual activity: Defer           Objective   Physical Exam   Constitutional: She is oriented to person, place, and time. She appears well-developed and well-nourished.   HENT:   Head: Normocephalic.   Right Ear: External ear normal.   Eyes: Conjunctivae are normal. Pupils are equal, round, and reactive to light.   Neck: Normal range of motion. Neck supple.   Cardiovascular: Normal rate, regular rhythm, normal heart sounds and intact distal pulses. PMI is not displaced. Exam reveals no decreased pulses.   No murmur heard.  Pulmonary/Chest: Effort normal. No accessory muscle usage. No tachypnea. No respiratory distress. She has no decreased breath sounds. She has wheezes in the right middle field, the right lower field, the left middle field and the left lower field. She has rhonchi in the right lower field and the left lower field. She has rales in the right lower field and the left lower field. She exhibits no tenderness, no crepitus and no edema.   Abdominal: Soft. Bowel sounds are normal. There is no tenderness.   Musculoskeletal: Normal range of motion. She exhibits no edema or tenderness.   Lower extremity exam bilaterally is unremarkable.  There is no right or left calf tenderness .  There is no palpable venous cord.  No obvious difference in the size of the legs.  No pitting edema.  The dorsalis pedis and posterior tibial femoral and popliteal pulses are palpable and +2 bilaterally.  Homans sign is negative   Neurological: She is alert and  oriented to person, place, and time. She has normal reflexes. No cranial nerve deficit. Coordination normal.   Skin: Skin is warm. No rash noted. No erythema.   Nursing note and vitals reviewed.      Procedures           ED Course  ED Course as of Sep 10 1608   Tue Sep 10, 2019   1606 Work-up was performed the patient's d-dimer was elevated her Wells score is approximately 4.5 therefore CT of the chest obtained which was negative for pulmonary embolus she is got asthma exacerbation I discussed this case with Dr. Pires the patient will be admitted to the hospital I do not believe the patient is septic or suffering from severe infection  [TS]      ED Course User Index  [TS] Carlos Gonzalez MD                  [facility]    Final diagnoses:   Moderate persistent asthma with acute exacerbation   Acute bronchitis, unspecified organism              Carlos Gonzalez MD  09/10/19 1602

## 2019-09-10 NOTE — NURSING NOTE
Called and spoke with Patsy OSULLIVAN, and made her aware of new admission and need for admission orders

## 2019-09-11 LAB
ALBUMIN SERPL-MCNC: 3.7 G/DL (ref 3.5–5.2)
ALBUMIN/GLOB SERPL: 2.1 G/DL
ALP SERPL-CCNC: 117 U/L (ref 39–117)
ALT SERPL W P-5'-P-CCNC: 38 U/L (ref 1–33)
ANION GAP SERPL CALCULATED.3IONS-SCNC: 11 MMOL/L (ref 5–15)
ARTERIAL PATENCY WRIST A: POSITIVE
AST SERPL-CCNC: 19 U/L (ref 1–32)
ATMOSPHERIC PRESS: 754 MMHG
BACTERIA BLD CULT: ABNORMAL
BASE EXCESS BLDA CALC-SCNC: 6 MMOL/L (ref 0–2)
BDY SITE: ABNORMAL
BILIRUB SERPL-MCNC: 0.5 MG/DL (ref 0.2–1.2)
BODY TEMPERATURE: 37 C
BUN BLD-MCNC: 35 MG/DL (ref 8–23)
BUN/CREAT SERPL: 33 (ref 7–25)
CALCIUM SPEC-SCNC: 9.7 MG/DL (ref 8.6–10.5)
CHLORIDE SERPL-SCNC: 96 MMOL/L (ref 98–107)
CO2 SERPL-SCNC: 31 MMOL/L (ref 22–29)
CREAT BLD-MCNC: 1.06 MG/DL (ref 0.57–1)
D-LACTATE SERPL-SCNC: 2.2 MMOL/L (ref 0.5–2)
DEPRECATED RDW RBC AUTO: 52.3 FL (ref 37–54)
ERYTHROCYTE [DISTWIDTH] IN BLOOD BY AUTOMATED COUNT: 14.4 % (ref 12.3–15.4)
GAS FLOW AIRWAY: 2 LPM
GFR SERPL CREATININE-BSD FRML MDRD: 50 ML/MIN/1.73
GLOBULIN UR ELPH-MCNC: 1.8 GM/DL
GLUCOSE BLD-MCNC: 179 MG/DL (ref 65–99)
GLUCOSE BLDC GLUCOMTR-MCNC: 185 MG/DL (ref 70–130)
GLUCOSE BLDC GLUCOMTR-MCNC: 207 MG/DL (ref 70–130)
GLUCOSE BLDC GLUCOMTR-MCNC: 230 MG/DL (ref 70–130)
GLUCOSE BLDC GLUCOMTR-MCNC: 231 MG/DL (ref 70–130)
HCO3 BLDA-SCNC: 30.8 MMOL/L (ref 20–26)
HCT VFR BLD AUTO: 47.4 % (ref 34–46.6)
HGB BLD-MCNC: 15.8 G/DL (ref 12–15.9)
HOROWITZ INDEX BLD+IHG-RTO: 28 %
LYMPHOCYTES # BLD MANUAL: 0.39 10*3/MM3 (ref 0.7–3.1)
LYMPHOCYTES NFR BLD MANUAL: 2 % (ref 5–12)
LYMPHOCYTES NFR BLD MANUAL: 3 % (ref 19.6–45.3)
Lab: ABNORMAL
MCH RBC QN AUTO: 33 PG (ref 26.6–33)
MCHC RBC AUTO-ENTMCNC: 33.3 G/DL (ref 31.5–35.7)
MCV RBC AUTO: 99 FL (ref 79–97)
METAMYELOCYTES NFR BLD MANUAL: 1 % (ref 0–0)
MODALITY: ABNORMAL
MONOCYTES # BLD AUTO: 0.26 10*3/MM3 (ref 0.1–0.9)
MYELOCYTES NFR BLD MANUAL: 3 % (ref 0–0)
NEUTROPHILS # BLD AUTO: 11.84 10*3/MM3 (ref 1.7–7)
NEUTROPHILS NFR BLD MANUAL: 87 % (ref 42.7–76)
NEUTS BAND NFR BLD MANUAL: 4 % (ref 0–5)
NT-PROBNP SERPL-MCNC: 244 PG/ML (ref 5–1800)
PCO2 BLDA: 43.6 MM HG (ref 35–45)
PH BLDA: 7.46 PH UNITS (ref 7.35–7.45)
PLAT MORPH BLD: NORMAL
PLATELET # BLD AUTO: 154 10*3/MM3 (ref 140–450)
PMV BLD AUTO: 9.9 FL (ref 6–12)
PO2 BLDA: 84.2 MM HG (ref 83–108)
POTASSIUM BLD-SCNC: 5 MMOL/L (ref 3.5–5.2)
PROT SERPL-MCNC: 5.5 G/DL (ref 6–8.5)
RBC # BLD AUTO: 4.79 10*6/MM3 (ref 3.77–5.28)
RBC MORPH BLD: NORMAL
SAO2 % BLDCOA: 97.5 % (ref 94–99)
SODIUM BLD-SCNC: 138 MMOL/L (ref 136–145)
VENTILATOR MODE: ABNORMAL
WBC MORPH BLD: NORMAL
WBC NRBC COR # BLD: 13.01 10*3/MM3 (ref 3.4–10.8)

## 2019-09-11 PROCEDURE — 85027 COMPLETE CBC AUTOMATED: CPT | Performed by: NURSE PRACTITIONER

## 2019-09-11 PROCEDURE — 63710000001 INSULIN LISPRO (HUMAN) PER 5 UNITS: Performed by: NURSE PRACTITIONER

## 2019-09-11 PROCEDURE — 82803 BLOOD GASES ANY COMBINATION: CPT

## 2019-09-11 PROCEDURE — 85007 BL SMEAR W/DIFF WBC COUNT: CPT | Performed by: NURSE PRACTITIONER

## 2019-09-11 PROCEDURE — 83880 ASSAY OF NATRIURETIC PEPTIDE: CPT | Performed by: NURSE PRACTITIONER

## 2019-09-11 PROCEDURE — 94799 UNLISTED PULMONARY SVC/PX: CPT

## 2019-09-11 PROCEDURE — 82962 GLUCOSE BLOOD TEST: CPT

## 2019-09-11 PROCEDURE — 25010000002 METHYLPREDNISOLONE PER 125 MG: Performed by: NURSE PRACTITIONER

## 2019-09-11 PROCEDURE — 25010000002 LEVOFLOXACIN PER 250 MG: Performed by: NURSE PRACTITIONER

## 2019-09-11 PROCEDURE — G0378 HOSPITAL OBSERVATION PER HR: HCPCS

## 2019-09-11 PROCEDURE — 25010000002 ENOXAPARIN PER 10 MG: Performed by: NURSE PRACTITIONER

## 2019-09-11 PROCEDURE — 80053 COMPREHEN METABOLIC PANEL: CPT | Performed by: NURSE PRACTITIONER

## 2019-09-11 PROCEDURE — 36600 WITHDRAWAL OF ARTERIAL BLOOD: CPT

## 2019-09-11 PROCEDURE — 83605 ASSAY OF LACTIC ACID: CPT | Performed by: NURSE PRACTITIONER

## 2019-09-11 RX ORDER — IPRATROPIUM BROMIDE AND ALBUTEROL SULFATE 2.5; .5 MG/3ML; MG/3ML
3 SOLUTION RESPIRATORY (INHALATION)
Status: DISCONTINUED | OUTPATIENT
Start: 2019-09-11 | End: 2019-09-16 | Stop reason: HOSPADM

## 2019-09-11 RX ORDER — BISACODYL 10 MG
10 SUPPOSITORY, RECTAL RECTAL DAILY PRN
Status: DISCONTINUED | OUTPATIENT
Start: 2019-09-11 | End: 2019-09-16 | Stop reason: HOSPADM

## 2019-09-11 RX ORDER — IPRATROPIUM BROMIDE AND ALBUTEROL SULFATE 2.5; .5 MG/3ML; MG/3ML
3 SOLUTION RESPIRATORY (INHALATION)
Status: DISCONTINUED | OUTPATIENT
Start: 2019-09-11 | End: 2019-09-11

## 2019-09-11 RX ADMIN — POTASSIUM CHLORIDE 10 MEQ: 10 CAPSULE, COATED, EXTENDED RELEASE ORAL at 09:15

## 2019-09-11 RX ADMIN — IPRATROPIUM BROMIDE AND ALBUTEROL SULFATE 3 ML: 2.5; .5 SOLUTION RESPIRATORY (INHALATION) at 15:12

## 2019-09-11 RX ADMIN — SENNOSIDES 17.2 MG: 8.6 TABLET, FILM COATED ORAL at 09:15

## 2019-09-11 RX ADMIN — NYSTATIN 1 APPLICATION: 100000 POWDER TOPICAL at 17:29

## 2019-09-11 RX ADMIN — SPIRONOLACTONE 50 MG: 50 TABLET, FILM COATED ORAL at 09:15

## 2019-09-11 RX ADMIN — IPRATROPIUM BROMIDE AND ALBUTEROL SULFATE 3 ML: 2.5; .5 SOLUTION RESPIRATORY (INHALATION) at 11:15

## 2019-09-11 RX ADMIN — INSULIN LISPRO 3 UNITS: 100 INJECTION, SOLUTION INTRAVENOUS; SUBCUTANEOUS at 20:39

## 2019-09-11 RX ADMIN — IPRATROPIUM BROMIDE AND ALBUTEROL SULFATE 3 ML: 2.5; .5 SOLUTION RESPIRATORY (INHALATION) at 05:47

## 2019-09-11 RX ADMIN — NYSTATIN 1 APPLICATION: 100000 POWDER TOPICAL at 20:40

## 2019-09-11 RX ADMIN — MONTELUKAST SODIUM 10 MG: 10 TABLET, FILM COATED ORAL at 20:40

## 2019-09-11 RX ADMIN — BISACODYL 10 MG: 10 SUPPOSITORY RECTAL at 14:55

## 2019-09-11 RX ADMIN — METHYLPREDNISOLONE SODIUM SUCCINATE 80 MG: 125 INJECTION, POWDER, FOR SOLUTION INTRAMUSCULAR; INTRAVENOUS at 23:10

## 2019-09-11 RX ADMIN — BUMETANIDE 2 MG: 2 TABLET ORAL at 07:05

## 2019-09-11 RX ADMIN — PRIMIDONE 12.5 MG: 50 TABLET ORAL at 20:40

## 2019-09-11 RX ADMIN — INSULIN LISPRO 3 UNITS: 100 INJECTION, SOLUTION INTRAVENOUS; SUBCUTANEOUS at 17:28

## 2019-09-11 RX ADMIN — LEVOTHYROXINE SODIUM 125 MCG: 125 TABLET ORAL at 06:04

## 2019-09-11 RX ADMIN — IPRATROPIUM BROMIDE AND ALBUTEROL SULFATE 3 ML: 2.5; .5 SOLUTION RESPIRATORY (INHALATION) at 20:10

## 2019-09-11 RX ADMIN — METHYLPREDNISOLONE SODIUM SUCCINATE 80 MG: 125 INJECTION, POWDER, FOR SOLUTION INTRAMUSCULAR; INTRAVENOUS at 11:33

## 2019-09-11 RX ADMIN — BUMETANIDE 2 MG: 2 TABLET ORAL at 17:29

## 2019-09-11 RX ADMIN — ENOXAPARIN SODIUM 40 MG: 40 INJECTION SUBCUTANEOUS at 20:43

## 2019-09-11 RX ADMIN — POTASSIUM CHLORIDE 10 MEQ: 10 CAPSULE, COATED, EXTENDED RELEASE ORAL at 17:29

## 2019-09-11 RX ADMIN — SODIUM CHLORIDE, PRESERVATIVE FREE 10 ML: 5 INJECTION INTRAVENOUS at 20:41

## 2019-09-11 RX ADMIN — LEVOFLOXACIN 500 MG: 5 INJECTION, SOLUTION INTRAVENOUS at 11:32

## 2019-09-11 RX ADMIN — POLYETHYLENE GLYCOL 3350 17 G: 17 POWDER, FOR SOLUTION ORAL at 09:15

## 2019-09-11 RX ADMIN — LANSOPRAZOLE 30 MG: KIT at 06:04

## 2019-09-11 RX ADMIN — INSULIN LISPRO 2 UNITS: 100 INJECTION, SOLUTION INTRAVENOUS; SUBCUTANEOUS at 09:19

## 2019-09-11 RX ADMIN — SODIUM CHLORIDE 50 ML/HR: 9 INJECTION, SOLUTION INTRAVENOUS at 00:28

## 2019-09-11 RX ADMIN — NYSTATIN 1 APPLICATION: 100000 POWDER TOPICAL at 09:15

## 2019-09-11 RX ADMIN — INSULIN LISPRO 3 UNITS: 100 INJECTION, SOLUTION INTRAVENOUS; SUBCUTANEOUS at 11:36

## 2019-09-11 NOTE — PROGRESS NOTES
Discharge Planning Assessment  [facility]     Patient Name: Carlotta Ortiz  MRN: [id number]  Today's Date: 9/11/2019    Admit Date: 9/10/2019    Discharge Needs Assessment     Row Name 09/11/19 1021       Living Environment    Lives With  facility resident    Name(s) of Who Lives With Patient  Resides at [facility]    Current Living Arrangements  extended care facility    Primary Care Provided by  other (see comments)    Provides Primary Care For  no one, unable/limited ability to care for self    Family Caregiver if Needed  none    Able to Return to Prior Arrangements  yes       Resource/Environmental Concerns    Resource/Environmental Concerns  none       Transition Planning    Patient/Family Anticipates Transition to  long term care facility       Discharge Needs Assessment    Readmission Within the Last 30 Days  no previous admission in last 30 days    Discharge Facility/Level of Care Needs  nursing facility, intermediate        Discharge Plan     Row Name 09/11/19 1025       Plan    Plan  [facility]    Patient/Family in Agreement with Plan  yes    Plan Comments  Pt resides at [facility]. Spoke with Kathryn there and bed held for pt's return at [facility] level care.  [phone]        Destination      No service coordination in this encounter.      Durable Medical Equipment      No service coordination in this encounter.      Dialysis/Infusion      No service coordination in this encounter.      Home Medical Care      No service coordination in this encounter.      Therapy      No service coordination in this encounter.      Community Resources      No service coordination in this encounter.          Demographic Summary    No documentation.       Functional Status    No documentation.       Psychosocial    No documentation.       Abuse/Neglect    No documentation.       Legal    No documentation.       Substance Abuse    No documentation.       Patient Forms    No  documentation.           ZACK SilvaW

## 2019-09-11 NOTE — PLAN OF CARE
Problem: Patient Care Overview  Goal: Plan of Care Review  Outcome: Ongoing (interventions implemented as appropriate)   09/11/19 1747   Coping/Psychosocial   Plan of Care Reviewed With patient   OTHER   Outcome Summary VSS, O2 at 1 L/NC no c/o SOB, no c/o of pain, c/o of constipation, scheduled senokot, also had ducolax supp and x2 prune juice, up with lift team to BS and had small amt of stool   Plan of Care Review   Progr   09/11/19 1747   Coping/Psychosocial   Plan of Care Reviewed With patient   OTHER   Outcome Summary VSS, O2 at 1 L/NC no c/o SOB, no c/o of pain, c/o of constipation, scheduled senokot and miralax, also had ducolax supp and x2 prune juice, up with lift team to BSC and had small amt of stool, good urine output this shift, purewick in use, conts to have moderate amt generalized edema   Plan of Care Review   Progress improving   ess improving       Problem: Fall Risk (Adult)  Goal: Absence of Fall  Outcome: Ongoing (interventions implemented as appropriate)      Problem: Breathing Pattern Ineffective (Adult)  Goal: Effective Oxygenation/Ventilation  Outcome: Ongoing (interventions implemented as appropriate)      Problem: Skin Injury Risk (Adult)  Goal: Skin Health and Integrity  Outcome: Ongoing (interventions implemented as appropriate)

## 2019-09-11 NOTE — PLAN OF CARE
Problem: Patient Care Overview  Goal: Plan of Care Review  Outcome: Ongoing (interventions implemented as appropriate)   09/11/19 0418   Coping/Psychosocial   Plan of Care Reviewed With patient;family   OTHER   Outcome Summary Pt denies pain. She c/o SOB. Oxygen at 2L NC. Lung sounds are diminished. External catheter in place. Pt refused Bumex, wants to speak with Dr. Brady about it. Pt has refused to be repositioned all night. Coccyx intact, red and blanchable.    Plan of Care Review   Progress no change     Goal: Individualization and Mutuality  Outcome: Ongoing (interventions implemented as appropriate)    Goal: Discharge Needs Assessment  Outcome: Ongoing (interventions implemented as appropriate)    Goal: Interprofessional Rounds/Family Conf  Outcome: Ongoing (interventions implemented as appropriate)      Problem: Fall Risk (Adult)  Goal: Identify Related Risk Factors and Signs and Symptoms  Outcome: Ongoing (interventions implemented as appropriate)    Goal: Absence of Fall  Outcome: Ongoing (interventions implemented as appropriate)    Goal: Identify Related Risk Factors and Signs and Symptoms  Outcome: Ongoing (interventions implemented as appropriate)    Goal: Absence of Fall  Outcome: Ongoing (interventions implemented as appropriate)      Problem: Breathing Pattern Ineffective (Adult)  Goal: Identify Related Risk Factors and Signs and Symptoms  Outcome: Ongoing (interventions implemented as appropriate)    Goal: Effective Oxygenation/Ventilation  Outcome: Ongoing (interventions implemented as appropriate)    Goal: Anxiety/Fear Reduction  Outcome: Ongoing (interventions implemented as appropriate)      Problem: Skin Injury Risk (Adult)  Goal: Identify Related Risk Factors and Signs and Symptoms  Outcome: Ongoing (interventions implemented as appropriate)    Goal: Skin Health and Integrity  Outcome: Ongoing (interventions implemented as appropriate)

## 2019-09-11 NOTE — H&P
Patient Care Team:  Ken Brady MD as PCP - General  Ken Brady MD as PCP - Family Medicine    Chief complaint SOB    Subjective     History of Present Illness   Patient was a direct admit from ED from [facility], hx of asthma exacerbation x several days worsening symptoms yesterday, cxr shows stable mass, with COPD/asthmatic changes.     Review of Systems   Constitutional: Positive for activity change, diaphoresis, fatigue and fever. Negative for appetite change and chills.   HENT: Negative for ear pain, facial swelling, sinus pressure, sinus pain and sore throat.    Respiratory: Positive for cough, shortness of breath and wheezing. Negative for apnea, choking and chest tightness.    Cardiovascular: Positive for leg swelling. Negative for chest pain and palpitations.   Gastrointestinal: Negative for abdominal pain, diarrhea, nausea and vomiting.   Genitourinary: Negative for dysuria, flank pain and pelvic pain.   Musculoskeletal: Negative for back pain, gait problem, joint swelling and neck pain.   Skin: Positive for rash. Negative for color change and pallor.   Neurological: Negative for dizziness, speech difficulty, numbness and headaches.        Past Medical History:   Diagnosis Date   • Asthma    • Cellulitis    • CHF (congestive heart failure) (CMS/HCC)    • Diabetes mellitus (CMS/HCC)    • Disease of thyroid gland    • Hypercholesteremia    • Hypertension    • Hypokalemia    • Kidney cysts    • Sleep apnea    • Unsteady gait      Past Surgical History:   Procedure Laterality Date   • ADENOIDECTOMY     • ANKLE SURGERY Left    • CHOLECYSTECTOMY     • TONSILLECTOMY     • TUBAL ABDOMINAL LIGATION       History reviewed. No pertinent family history.  Social History     Tobacco Use   • Smoking status: Never Smoker   • Smokeless tobacco: Never Used   Substance Use Topics   • Alcohol use: No   • Drug use: No     Medications Prior to Admission   Medication Sig Dispense Refill Last  Dose   • albuterol (PROVENTIL HFA;VENTOLIN HFA) 108 (90 Base) MCG/ACT inhaler Inhale 2 puffs Every 6 (Six) Hours As Needed for Wheezing or Shortness of Air.   Past Month at Unknown time   • albuterol (PROVENTIL) (2.5 MG/3ML) 0.083% nebulizer solution Take 3 mL by nebulization Every 4 (Four) Hours As Needed for Wheezing or Shortness of Air (asthma).   Past Month at Unknown time   • aluminum-magnesium hydroxide-simethicone (MAALOX/MYLANTA) 200-200-20 MG/5ML suspension Take 30 mL by mouth Every 6 (Six) Hours As Needed for Indigestion or Heartburn.   Past Month at Unknown time   • bumetanide (BUMEX) 2 MG tablet Take 2 mg by mouth 2 (Two) Times a Day.   9/10/2019 at Unknown time   • cetirizine (zyrTEC) 10 MG tablet Take 10 mg by mouth Daily.   9/10/2019 at Unknown time   • furosemide (LASIX) 40 MG tablet Take 40 mg by mouth 2 (Two) Times a Day.   9/10/2019 at Unknown time   • levothyroxine (SYNTHROID, LEVOTHROID) 125 MCG tablet Take 125 mcg by mouth Daily.   9/10/2019 at Unknown time   • magnesium hydroxide (MILK OF MAGNESIA) 2400 MG/10ML suspension suspension Take 10 mL by mouth Daily As Needed (constipation). 1000 mL 3 Past Month at Unknown time   • montelukast (SINGULAIR) 10 MG tablet Take 1 tablet by mouth Every Night.   9/9/2019 at Unknown time   • multivitamin (DAILY JACKY) tablet tablet Take 1 tablet by mouth Daily.   9/10/2019 at Unknown time   • nystatin (MYCOSTATIN) 012257 UNIT/GM powder Apply 1 application topically to the appropriate area as directed 3 (Three) Times a Day.   9/10/2019 at Unknown time   • omeprazole (priLOSEC) 20 MG capsule Take 20 mg by mouth Daily.   9/10/2019 at Unknown time   • ondansetron (ZOFRAN) 4 MG tablet Take 4 mg by mouth Every 8 (Eight) Hours As Needed for Nausea or Vomiting.   Past Month at Unknown time   • polyethylene glycol (MIRALAX) packet Take 17 g by mouth Daily.   9/10/2019 at Unknown time   • potassium chloride (MICRO-K) 10 MEQ CR capsule Take 1 capsule by mouth 2 (Two)  Times a Day.   9/10/2019 at Unknown time   • predniSONE (DELTASONE) 20 MG tablet Take 2 tablets by mouth Daily. 10 tablet 0 9/10/2019 at Unknown time   • primidone (MYSOLINE) 50 MG tablet Take 12.5 mg by mouth Every Night.   9/9/2019 at Unknown time   • senna (SENOKOT) 8.6 MG tablet tablet Take 2 tablets by mouth Daily.   9/10/2019 at Unknown time   • spironolactone (ALDACTONE) 50 MG tablet Take 50 mg by mouth Daily.   9/10/2019 at Unknown time     Allergies:  Aminophylline; Cephalexin; Spiractone [spironolactone]; Sulfa antibiotics; Sulfacetamide sodium; Ampicillin; and Penicillins    Objective      Vital Signs  Temp:  [97.7 °F (36.5 °C)-99.2 °F (37.3 °C)] 97.7 °F (36.5 °C)  Heart Rate:  [] 74  Resp:  [16-24] 16  BP: (108-168)/(55-93) 158/74    Physical Exam   Constitutional: She is oriented to person, place, and time. She appears well-developed and well-nourished. No distress.   HENT:   Head: Normocephalic and atraumatic.   Eyes: EOM are normal. Pupils are equal, round, and reactive to light.   Neck: Normal range of motion. Neck supple.   Cardiovascular: Normal rate and regular rhythm.   Pulmonary/Chest: She has wheezes. She has rhonchi in the right lower field and the left lower field.   SOB with speaking   Abdominal: Soft. Bowel sounds are normal.   Musculoskeletal: She exhibits edema.   Neurological: She is alert and oriented to person, place, and time.   Skin: Skin is warm and dry. Capillary refill takes 2 to 3 seconds. Rash noted. She is not diaphoretic. No erythema. No pallor.   Petechiae to left arm   Psychiatric: She has a normal mood and affect.       Results Review:   I reviewed the patient's new clinical results.      Assessment/Plan       Moderate persistent asthma with acute exacerbation      Assessment:  (Asthma Exacerbation  Lower Extremity Edema  Type 2 DM).     Plan:   (Breathing improved this am, sats good  Continue Steroids/Nebs/ABX  Monitor glucose  ABG improved this am  Will monitor  clinical course closely  PT considering home care vs going back to nursing home per family).       I discussed the patients findings and my recommendations with patient and family    SHI Briggs  09/11/19  9:00 AM    Time: 15 min FTF time     Pt. was seen and independently examined by me.  I have reviewed the PA/ARNP's note and agree with above.      Electronically signed by Ken Brady MD on 9/11/2019 at 5:55 PM

## 2019-09-12 LAB
ANION GAP SERPL CALCULATED.3IONS-SCNC: 12 MMOL/L (ref 5–15)
BACTERIA SPEC AEROBE CULT: ABNORMAL
BASOPHILS # BLD AUTO: 0.03 10*3/MM3 (ref 0–0.2)
BASOPHILS NFR BLD AUTO: 0.2 % (ref 0–1.5)
BUN BLD-MCNC: 35 MG/DL (ref 8–23)
BUN/CREAT SERPL: 33 (ref 7–25)
CALCIUM SPEC-SCNC: 9.6 MG/DL (ref 8.6–10.5)
CHLORIDE SERPL-SCNC: 96 MMOL/L (ref 98–107)
CO2 SERPL-SCNC: 29 MMOL/L (ref 22–29)
CREAT BLD-MCNC: 1.06 MG/DL (ref 0.57–1)
D-LACTATE SERPL-SCNC: 1.6 MMOL/L (ref 0.5–2)
DEPRECATED RDW RBC AUTO: 52 FL (ref 37–54)
EOSINOPHIL # BLD AUTO: 0 10*3/MM3 (ref 0–0.4)
EOSINOPHIL NFR BLD AUTO: 0 % (ref 0.3–6.2)
ERYTHROCYTE [DISTWIDTH] IN BLOOD BY AUTOMATED COUNT: 14.3 % (ref 12.3–15.4)
GFR SERPL CREATININE-BSD FRML MDRD: 50 ML/MIN/1.73
GLUCOSE BLD-MCNC: 201 MG/DL (ref 65–99)
GLUCOSE BLDC GLUCOMTR-MCNC: 190 MG/DL (ref 70–130)
GLUCOSE BLDC GLUCOMTR-MCNC: 204 MG/DL (ref 70–130)
GLUCOSE BLDC GLUCOMTR-MCNC: 205 MG/DL (ref 70–130)
GLUCOSE BLDC GLUCOMTR-MCNC: 211 MG/DL (ref 70–130)
GRAM STN SPEC: ABNORMAL
HCT VFR BLD AUTO: 47 % (ref 34–46.6)
HGB BLD-MCNC: 15.9 G/DL (ref 12–15.9)
IMM GRANULOCYTES # BLD AUTO: 0.33 10*3/MM3 (ref 0–0.05)
IMM GRANULOCYTES NFR BLD AUTO: 2.3 % (ref 0–0.5)
ISOLATED FROM: ABNORMAL
LYMPHOCYTES # BLD AUTO: 0.35 10*3/MM3 (ref 0.7–3.1)
LYMPHOCYTES NFR BLD AUTO: 2.4 % (ref 19.6–45.3)
MCH RBC QN AUTO: 33.1 PG (ref 26.6–33)
MCHC RBC AUTO-ENTMCNC: 33.8 G/DL (ref 31.5–35.7)
MCV RBC AUTO: 97.9 FL (ref 79–97)
MONOCYTES # BLD AUTO: 0.33 10*3/MM3 (ref 0.1–0.9)
MONOCYTES NFR BLD AUTO: 2.3 % (ref 5–12)
NEUTROPHILS # BLD AUTO: 13.33 10*3/MM3 (ref 1.7–7)
NEUTROPHILS NFR BLD AUTO: 92.8 % (ref 42.7–76)
NRBC BLD AUTO-RTO: 0 /100 WBC (ref 0–0.2)
PLATELET # BLD AUTO: 163 10*3/MM3 (ref 140–450)
PMV BLD AUTO: 10 FL (ref 6–12)
POTASSIUM BLD-SCNC: 4.8 MMOL/L (ref 3.5–5.2)
RBC # BLD AUTO: 4.8 10*6/MM3 (ref 3.77–5.28)
SODIUM BLD-SCNC: 137 MMOL/L (ref 136–145)
WBC NRBC COR # BLD: 14.37 10*3/MM3 (ref 3.4–10.8)

## 2019-09-12 PROCEDURE — 63710000001 INSULIN LISPRO (HUMAN) PER 5 UNITS: Performed by: NURSE PRACTITIONER

## 2019-09-12 PROCEDURE — G0378 HOSPITAL OBSERVATION PER HR: HCPCS

## 2019-09-12 PROCEDURE — 83605 ASSAY OF LACTIC ACID: CPT | Performed by: NURSE PRACTITIONER

## 2019-09-12 PROCEDURE — 82962 GLUCOSE BLOOD TEST: CPT

## 2019-09-12 PROCEDURE — 94799 UNLISTED PULMONARY SVC/PX: CPT

## 2019-09-12 PROCEDURE — 63710000001 PREDNISONE PER 1 MG: Performed by: NURSE PRACTITIONER

## 2019-09-12 PROCEDURE — 94760 N-INVAS EAR/PLS OXIMETRY 1: CPT

## 2019-09-12 PROCEDURE — 80048 BASIC METABOLIC PNL TOTAL CA: CPT | Performed by: NURSE PRACTITIONER

## 2019-09-12 PROCEDURE — 85025 COMPLETE CBC W/AUTO DIFF WBC: CPT | Performed by: NURSE PRACTITIONER

## 2019-09-12 PROCEDURE — 25010000002 ENOXAPARIN PER 10 MG: Performed by: NURSE PRACTITIONER

## 2019-09-12 RX ORDER — PREDNISONE 20 MG/1
40 TABLET ORAL DAILY
Status: DISCONTINUED | OUTPATIENT
Start: 2019-09-12 | End: 2019-09-13

## 2019-09-12 RX ORDER — FUROSEMIDE 20 MG/1
20 TABLET ORAL DAILY
Status: DISCONTINUED | OUTPATIENT
Start: 2019-09-12 | End: 2019-09-13

## 2019-09-12 RX ORDER — CALCIUM CARBONATE 200(500)MG
1 TABLET,CHEWABLE ORAL 3 TIMES DAILY PRN
Status: DISCONTINUED | OUTPATIENT
Start: 2019-09-12 | End: 2019-09-16 | Stop reason: HOSPADM

## 2019-09-12 RX ADMIN — BUMETANIDE 2 MG: 2 TABLET ORAL at 06:28

## 2019-09-12 RX ADMIN — NYSTATIN 1 APPLICATION: 100000 POWDER TOPICAL at 08:51

## 2019-09-12 RX ADMIN — POTASSIUM CHLORIDE 10 MEQ: 10 CAPSULE, COATED, EXTENDED RELEASE ORAL at 17:34

## 2019-09-12 RX ADMIN — LEVOTHYROXINE SODIUM 125 MCG: 125 TABLET ORAL at 06:28

## 2019-09-12 RX ADMIN — SPIRONOLACTONE 50 MG: 50 TABLET, FILM COATED ORAL at 08:51

## 2019-09-12 RX ADMIN — SODIUM CHLORIDE, PRESERVATIVE FREE 10 ML: 5 INJECTION INTRAVENOUS at 21:11

## 2019-09-12 RX ADMIN — SENNOSIDES 17.2 MG: 8.6 TABLET, FILM COATED ORAL at 08:51

## 2019-09-12 RX ADMIN — ENOXAPARIN SODIUM 40 MG: 40 INJECTION SUBCUTANEOUS at 21:09

## 2019-09-12 RX ADMIN — INSULIN LISPRO 2 UNITS: 100 INJECTION, SOLUTION INTRAVENOUS; SUBCUTANEOUS at 17:34

## 2019-09-12 RX ADMIN — BUMETANIDE 2 MG: 2 TABLET ORAL at 17:34

## 2019-09-12 RX ADMIN — NYSTATIN 1 APPLICATION: 100000 POWDER TOPICAL at 21:10

## 2019-09-12 RX ADMIN — INSULIN LISPRO 3 UNITS: 100 INJECTION, SOLUTION INTRAVENOUS; SUBCUTANEOUS at 08:52

## 2019-09-12 RX ADMIN — LANSOPRAZOLE 30 MG: KIT at 08:52

## 2019-09-12 RX ADMIN — PREDNISONE 40 MG: 20 TABLET ORAL at 10:37

## 2019-09-12 RX ADMIN — PRIMIDONE 12.5 MG: 50 TABLET ORAL at 21:10

## 2019-09-12 RX ADMIN — CALCIUM CARBONATE 1 TABLET: 500 TABLET, CHEWABLE ORAL at 05:45

## 2019-09-12 RX ADMIN — POLYETHYLENE GLYCOL 3350 17 G: 17 POWDER, FOR SOLUTION ORAL at 08:51

## 2019-09-12 RX ADMIN — IPRATROPIUM BROMIDE AND ALBUTEROL SULFATE 3 ML: 2.5; .5 SOLUTION RESPIRATORY (INHALATION) at 01:47

## 2019-09-12 RX ADMIN — INSULIN LISPRO 3 UNITS: 100 INJECTION, SOLUTION INTRAVENOUS; SUBCUTANEOUS at 21:09

## 2019-09-12 RX ADMIN — FUROSEMIDE 20 MG: 20 TABLET ORAL at 10:38

## 2019-09-12 RX ADMIN — INSULIN LISPRO 3 UNITS: 100 INJECTION, SOLUTION INTRAVENOUS; SUBCUTANEOUS at 12:40

## 2019-09-12 RX ADMIN — MONTELUKAST SODIUM 10 MG: 10 TABLET, FILM COATED ORAL at 21:10

## 2019-09-12 RX ADMIN — IPRATROPIUM BROMIDE AND ALBUTEROL SULFATE 3 ML: 2.5; .5 SOLUTION RESPIRATORY (INHALATION) at 06:12

## 2019-09-12 RX ADMIN — IPRATROPIUM BROMIDE AND ALBUTEROL SULFATE 3 ML: 2.5; .5 SOLUTION RESPIRATORY (INHALATION) at 10:12

## 2019-09-12 RX ADMIN — IPRATROPIUM BROMIDE AND ALBUTEROL SULFATE 3 ML: 2.5; .5 SOLUTION RESPIRATORY (INHALATION) at 19:14

## 2019-09-12 RX ADMIN — POTASSIUM CHLORIDE 10 MEQ: 10 CAPSULE, COATED, EXTENDED RELEASE ORAL at 08:52

## 2019-09-12 RX ADMIN — SODIUM CHLORIDE, PRESERVATIVE FREE 10 ML: 5 INJECTION INTRAVENOUS at 08:52

## 2019-09-12 NOTE — PROGRESS NOTES
LOS: 0 days   Patient Care Team:  Ken Brady MD as PCP - General  Ken Brady MD as PCP - Family Medicine    Chief Complaint: Constipation    Subjective     History of Present Illness     SOB resolved    Subjective     Review of Systems   Constitutional: Positive for activity change. Negative for appetite change and chills, SOB, diaphoresis, fever   HENT: Negative for ear pain, facial swelling, sinus pressure, sinus pain and sore throat.    Respiratory: Positive for cough. Negative for apnea, choking, SOB or wheezing and chest tightness.    Cardiovascular: Positive for leg swelling. Negative for chest pain and palpitations.   Gastrointestinal: Negative for abdominal pain, diarrhea, nausea and vomiting.   Genitourinary: Negative for dysuria, flank pain and pelvic pain.   Musculoskeletal: Negative for back pain, gait problem, joint swelling and neck pain.   Skin:  Negative for color change and pallor.   Neurological: Negative for dizziness, speech difficulty, numbness and headaches.    History taken from: patient    Objective     Vital Signs  Temp:  [98.4 °F (36.9 °C)-99.2 °F (37.3 °C)] 98.7 °F (37.1 °C)  Heart Rate:  [] 116  Resp:  [16-20] 18  BP: (131-148)/(69-91) 131/91    Objective     Physical Exam   Constitutional: She is oriented to person, place, and time. She appears well-developed and well-nourished. No distress.   HENT:   Head: Normocephalic and atraumatic.   Eyes: EOM are normal. Pupils are equal, round, and reactive to light.   Neck: Normal range of motion. Neck supple.   Cardiovascular: Normal rate and regular rhythm.   Pulmonary/Chest: She has no wheezes, no rhonchi  Abdominal: Soft. Bowel sounds are normal.   Musculoskeletal: She exhibits edema.   Neurological: She is alert and oriented to person, place, and time.   Skin: Skin is warm and dry. Capillary refill takes 2 to 3 seconds. Rash noted. She is not diaphoretic. No erythema. No pallor.   Petechiae to left arm    Psychiatric: She has a normal mood and affect.     Results Review:     I reviewed the patient's new clinical results.    Medication Review: see med list    Assessment/Plan       Moderate persistent asthma with acute exacerbation   Constipation    Assessment & Plan   Will d/c IV steroids, change to PO, continue nebs  D/C Antibiotics  Add po lasix  Add Miralax  Up in chair today  Consider D/C tomorrow if tolerates po steroids      SHI Briggs  09/12/19  9:23 AM    Time: 10 min FTF time    Pt. was seen and independently examined by me.  I have reviewed the PA/ARNP's note and agree with above.      Electronically signed by Ken Brady MD on 9/12/2019 at 5:52 PM

## 2019-09-12 NOTE — PLAN OF CARE
Problem: Patient Care Overview  Goal: Plan of Care Review  Outcome: Ongoing (interventions implemented as appropriate)   09/12/19 1650   Coping/Psychosocial   Plan of Care Reviewed With patient   OTHER   Outcome Summary Patient given Miralax for constipation. Called [facility] to verify patients last known BM, was told 09/10/2019. Miralax has been added to daily regimen.No c/o of pain.Purewick in place, Safety maintained, vss, will follow.   Plan of Care Review   Progress no change       Problem: Fall Risk (Adult)  Goal: Absence of Fall  Outcome: Ongoing (interventions implemented as appropriate)    Goal: Identify Related Risk Factors and Signs and Symptoms  Outcome: Ongoing (interventions implemented as appropriate)    Goal: Absence of Fall  Outcome: Ongoing (interventions implemented as appropriate)      Problem: Breathing Pattern Ineffective (Adult)  Goal: Effective Oxygenation/Ventilation  Outcome: Ongoing (interventions implemented as appropriate)    Goal: Anxiety/Fear Reduction  Outcome: Ongoing (interventions implemented as appropriate)      Problem: Skin Injury Risk (Adult)  Goal: Skin Health and Integrity  Outcome: Ongoing (interventions implemented as appropriate)

## 2019-09-12 NOTE — PLAN OF CARE
Problem: Patient Care Overview  Goal: Plan of Care Review  Outcome: Ongoing (interventions implemented as appropriate)   09/12/19 1191   Coping/Psychosocial   Plan of Care Reviewed With patient   OTHER   Outcome Summary No C/O pain. Reported had small hard BM yesterday after numerous interventions. On O 2 at 1 NC with saturations in the low to mid 90's. Patient reluctant to reposition. Safety maintained.   Plan of Care Review   Progress no change       Problem: Fall Risk (Adult)  Goal: Identify Related Risk Factors and Signs and Symptoms  Outcome: Outcome(s) achieved Date Met: 09/12/19    Goal: Absence of Fall  Outcome: Ongoing (interventions implemented as appropriate)      Problem: Breathing Pattern Ineffective (Adult)  Goal: Identify Related Risk Factors and Signs and Symptoms  Outcome: Outcome(s) achieved Date Met: 09/12/19    Goal: Effective Oxygenation/Ventilation  Outcome: Ongoing (interventions implemented as appropriate)    Goal: Anxiety/Fear Reduction  Outcome: Ongoing (interventions implemented as appropriate)      Problem: Skin Injury Risk (Adult)  Goal: Identify Related Risk Factors and Signs and Symptoms  Outcome: Outcome(s) achieved Date Met: 09/12/19    Goal: Skin Health and Integrity  Outcome: Ongoing (interventions implemented as appropriate)

## 2019-09-13 LAB
GLUCOSE BLDC GLUCOMTR-MCNC: 166 MG/DL (ref 70–130)
GLUCOSE BLDC GLUCOMTR-MCNC: 171 MG/DL (ref 70–130)
GLUCOSE BLDC GLUCOMTR-MCNC: 225 MG/DL (ref 70–130)
GLUCOSE BLDC GLUCOMTR-MCNC: 232 MG/DL (ref 70–130)

## 2019-09-13 PROCEDURE — 82962 GLUCOSE BLOOD TEST: CPT

## 2019-09-13 PROCEDURE — 94760 N-INVAS EAR/PLS OXIMETRY 1: CPT

## 2019-09-13 PROCEDURE — 94799 UNLISTED PULMONARY SVC/PX: CPT

## 2019-09-13 PROCEDURE — 25010000002 ENOXAPARIN PER 10 MG: Performed by: NURSE PRACTITIONER

## 2019-09-13 PROCEDURE — 63710000001 INSULIN LISPRO (HUMAN) PER 5 UNITS: Performed by: NURSE PRACTITIONER

## 2019-09-13 PROCEDURE — 25010000002 LEVOFLOXACIN PER 250 MG: Performed by: FAMILY MEDICINE

## 2019-09-13 PROCEDURE — 99222 1ST HOSP IP/OBS MODERATE 55: CPT | Performed by: INTERNAL MEDICINE

## 2019-09-13 PROCEDURE — 25010000002 METHYLPREDNISOLONE PER 40 MG: Performed by: FAMILY MEDICINE

## 2019-09-13 RX ORDER — METHYLPREDNISOLONE SODIUM SUCCINATE 40 MG/ML
40 INJECTION, POWDER, LYOPHILIZED, FOR SOLUTION INTRAMUSCULAR; INTRAVENOUS EVERY 12 HOURS
Status: DISCONTINUED | OUTPATIENT
Start: 2019-09-13 | End: 2019-09-14

## 2019-09-13 RX ORDER — LEVOFLOXACIN 5 MG/ML
500 INJECTION, SOLUTION INTRAVENOUS EVERY 24 HOURS
Status: DISCONTINUED | OUTPATIENT
Start: 2019-09-13 | End: 2019-09-16 | Stop reason: HOSPADM

## 2019-09-13 RX ADMIN — NYSTATIN 1 APPLICATION: 100000 POWDER TOPICAL at 08:30

## 2019-09-13 RX ADMIN — LEVOFLOXACIN 500 MG: 5 INJECTION, SOLUTION INTRAVENOUS at 08:30

## 2019-09-13 RX ADMIN — BUMETANIDE 2 MG: 2 TABLET ORAL at 06:38

## 2019-09-13 RX ADMIN — SENNOSIDES 17.2 MG: 8.6 TABLET, FILM COATED ORAL at 08:30

## 2019-09-13 RX ADMIN — METHYLPREDNISOLONE SODIUM SUCCINATE 40 MG: 40 INJECTION, POWDER, FOR SOLUTION INTRAMUSCULAR; INTRAVENOUS at 08:30

## 2019-09-13 RX ADMIN — INSULIN LISPRO 3 UNITS: 100 INJECTION, SOLUTION INTRAVENOUS; SUBCUTANEOUS at 21:56

## 2019-09-13 RX ADMIN — INSULIN LISPRO 2 UNITS: 100 INJECTION, SOLUTION INTRAVENOUS; SUBCUTANEOUS at 08:30

## 2019-09-13 RX ADMIN — POTASSIUM CHLORIDE 10 MEQ: 10 CAPSULE, COATED, EXTENDED RELEASE ORAL at 17:29

## 2019-09-13 RX ADMIN — IPRATROPIUM BROMIDE AND ALBUTEROL SULFATE 3 ML: 2.5; .5 SOLUTION RESPIRATORY (INHALATION) at 06:47

## 2019-09-13 RX ADMIN — INSULIN LISPRO 3 UNITS: 100 INJECTION, SOLUTION INTRAVENOUS; SUBCUTANEOUS at 17:33

## 2019-09-13 RX ADMIN — POLYETHYLENE GLYCOL 3350 17 G: 17 POWDER, FOR SOLUTION ORAL at 08:30

## 2019-09-13 RX ADMIN — IPRATROPIUM BROMIDE AND ALBUTEROL SULFATE 3 ML: 2.5; .5 SOLUTION RESPIRATORY (INHALATION) at 18:40

## 2019-09-13 RX ADMIN — NYSTATIN 1 APPLICATION: 100000 POWDER TOPICAL at 21:53

## 2019-09-13 RX ADMIN — LANSOPRAZOLE 30 MG: KIT at 06:34

## 2019-09-13 RX ADMIN — IPRATROPIUM BROMIDE AND ALBUTEROL SULFATE 3 ML: 2.5; .5 SOLUTION RESPIRATORY (INHALATION) at 00:20

## 2019-09-13 RX ADMIN — ENOXAPARIN SODIUM 40 MG: 40 INJECTION SUBCUTANEOUS at 21:51

## 2019-09-13 RX ADMIN — MONTELUKAST SODIUM 10 MG: 10 TABLET, FILM COATED ORAL at 22:03

## 2019-09-13 RX ADMIN — IPRATROPIUM BROMIDE AND ALBUTEROL SULFATE 3 ML: 2.5; .5 SOLUTION RESPIRATORY (INHALATION) at 23:52

## 2019-09-13 RX ADMIN — IPRATROPIUM BROMIDE AND ALBUTEROL SULFATE 3 ML: 2.5; .5 SOLUTION RESPIRATORY (INHALATION) at 10:57

## 2019-09-13 RX ADMIN — PRIMIDONE 12.5 MG: 50 TABLET ORAL at 22:04

## 2019-09-13 RX ADMIN — SPIRONOLACTONE 50 MG: 50 TABLET, FILM COATED ORAL at 08:31

## 2019-09-13 RX ADMIN — LEVOTHYROXINE SODIUM 125 MCG: 125 TABLET ORAL at 06:35

## 2019-09-13 RX ADMIN — BUMETANIDE 2 MG: 2 TABLET ORAL at 17:28

## 2019-09-13 RX ADMIN — POTASSIUM CHLORIDE 10 MEQ: 10 CAPSULE, COATED, EXTENDED RELEASE ORAL at 08:30

## 2019-09-13 RX ADMIN — CALCIUM CARBONATE 1 TABLET: 500 TABLET, CHEWABLE ORAL at 02:23

## 2019-09-13 RX ADMIN — NYSTATIN 1 APPLICATION: 100000 POWDER TOPICAL at 15:24

## 2019-09-13 RX ADMIN — SODIUM CHLORIDE, PRESERVATIVE FREE 10 ML: 5 INJECTION INTRAVENOUS at 21:53

## 2019-09-13 RX ADMIN — INSULIN LISPRO 2 UNITS: 100 INJECTION, SOLUTION INTRAVENOUS; SUBCUTANEOUS at 12:35

## 2019-09-13 RX ADMIN — METHYLPREDNISOLONE SODIUM SUCCINATE 40 MG: 40 INJECTION, POWDER, FOR SOLUTION INTRAMUSCULAR; INTRAVENOUS at 21:52

## 2019-09-13 RX ADMIN — SODIUM CHLORIDE, PRESERVATIVE FREE 10 ML: 5 INJECTION INTRAVENOUS at 08:32

## 2019-09-13 NOTE — PROGRESS NOTES
[facility]  Daily Progress Note  Carlotta Ortiz  MRN: [id number] LOS: 0    Admit Date: 9/10/2019   9/13/2019 7:17 AM    Subjective:          Chief Complaint:  Chief Complaint   Patient presents with   • Shortness of Breath       Interval History:    Reviewed overnight events and nursing notes.   Status:  SLOWLY IMPROVING  Pain:  some relief    RESPONDING TO TX BUT SLOWLY DUE TO LEVEL OF DEBILITATION (CHRONIC NHP, IMMOBILE)  EXTENSIVE HX OF SEVERE ASTHMA AND COPD    ROS:  10 point ROS obtained:   Gen: No fevers  HEENT: No migraine or visual change  Lung: No cough, hemoptysis or wheezing  Cv: No chest pain or pressure  Abd: No nausea or vomiting, no change in bowel function, no gi bleeding  Ext: No increased pain or swelling  Neuro: No seizure or syncope  Skin: No new rashes  Endocrine: No polyuria, polyphagia or polydipsia  Psych: No increased anxiety or depression  MS: No increase in joint pain or swelling reported    DIET:  Diet Regular; Consistent Carbohydrate    Medications:        bumetanide 2 mg Oral BID   enoxaparin 40 mg Subcutaneous Q24H   furosemide 20 mg Oral Daily   insulin lispro 2-7 Units Subcutaneous 4x Daily With Meals & Nightly   ipratropium-albuterol 3 mL Nebulization Q6H - RT   lansoprazole 30 mg Oral Q AM   levothyroxine 125 mcg Oral Q AM   montelukast 10 mg Oral Nightly   nystatin 1 application Topical TID   polyethylene glycol 17 g Oral Daily   potassium chloride 10 mEq Oral BID With Meals   predniSONE 40 mg Oral Daily   primidone 12.5 mg Oral Nightly   senna 2 tablet Oral Daily   sodium chloride 10 mL Intravenous Q12H   spironolactone 50 mg Oral Daily       Data:     Code Status:   Code Status and Medical Interventions:   Ordered at: 09/10/19 2046     Level Of Support Discussed With:    Patient     Code Status:    CPR     Medical Interventions (Level of Support Prior to Arrest):    Full       History reviewed. No pertinent family history.  Social History     Socioeconomic History    • Marital status:      Spouse name: Not on file   • Number of children: Not on file   • Years of education: Not on file   • Highest education level: Not on file   Tobacco Use   • Smoking status: Never Smoker   • Smokeless tobacco: Never Used   Substance and Sexual Activity   • Alcohol use: No   • Drug use: No   • Sexual activity: Defer       Labs:    Lab Results (last 72 hours)     Procedure Component Value Units Date/Time    POC Glucose Once [[id number]]  (Abnormal) Collected:  09/12/19 2104    Specimen:  Blood Updated:  09/12/19 2126     Glucose 205 mg/dL      Comment: : [id number] Lionel Morris ID: [id number]       POC Glucose Once [[id number]]  (Abnormal) Collected:  09/12/19 1605    Specimen:  Blood Updated:  09/12/19 1623     Glucose 190 mg/dL      Comment: : [id number] Alexsander Brand ID: [id number]       Blood Culture - Blood, Arm, Right [[id number]] Collected:  09/10/19 1154    Specimen:  Blood from Arm, Right Updated:  09/12/19 1215     Blood Culture No growth at 2 days    POC Glucose Once [[id number]]  (Abnormal) Collected:  09/12/19 1130    Specimen:  Blood Updated:  09/12/19 1143     Glucose 204 mg/dL      Comment: : [id number] Tahir Chauhan ID: [id number]       POC Glucose Once [[id number]]  (Abnormal) Collected:  09/12/19 0750    Specimen:  Blood Updated:  09/12/19 0811     Glucose 211 mg/dL      Comment: : [id number] Tahir AndersonMeter ID: [id number]       Blood Culture - Blood, Hand, Left [[id number]]  (Abnormal) Collected:  09/10/19 1144    Specimen:  Blood from Hand, Left Updated:  09/12/19 0614     Blood Culture Staphylococcus, coagulase negative     Comment: Probable contaminant requires clinical correlation, susceptibility not performed unless requested by physician.          Isolated from Aerobic Bottle     Gram Stain Gram positive cocci in clusters    Basic Metabolic Panel [[id number]]  (Abnormal) Collected:  09/12/19 0354    Specimen:  Blood Updated:   09/12/19 0420     Glucose 201 mg/dL      BUN 35 mg/dL      Creatinine 1.06 mg/dL      Sodium 137 mmol/L      Potassium 4.8 mmol/L      Chloride 96 mmol/L      CO2 29.0 mmol/L      Calcium 9.6 mg/dL      eGFR Non African Amer 50 mL/min/1.73      BUN/Creatinine Ratio 33.0     Anion Gap 12.0 mmol/L     Narrative:       GFR Normal >60  Chronic Kidney Disease <60  Kidney Failure <15    Lactic Acid, Plasma [[id number]]  (Normal) Collected:  09/12/19 0354    Specimen:  Blood Updated:  09/12/19 0413     Lactate 1.6 mmol/L     CBC & Differential [[id number]] Collected:  09/12/19 0354    Specimen:  Blood Updated:  09/12/19 0407    Narrative:       The following orders were created for panel order CBC & Differential.  Procedure                               Abnormality         Status                     ---------                               -----------         ------                     CBC Auto Differential[[id number]]        Abnormal            Final result                 Please view results for these tests on the individual orders.    CBC Auto Differential [[id number]]  (Abnormal) Collected:  09/12/19 0354    Specimen:  Blood Updated:  09/12/19 0407     WBC 14.37 10*3/mm3      RBC 4.80 10*6/mm3      Hemoglobin 15.9 g/dL      Hematocrit 47.0 %      MCV 97.9 fL      MCH 33.1 pg      MCHC 33.8 g/dL      RDW 14.3 %      RDW-SD 52.0 fl      MPV 10.0 fL      Platelets 163 10*3/mm3      Neutrophil % 92.8 %      Lymphocyte % 2.4 %      Monocyte % 2.3 %      Eosinophil % 0.0 %      Basophil % 0.2 %      Immature Grans % 2.3 %      Neutrophils, Absolute 13.33 10*3/mm3      Lymphocytes, Absolute 0.35 10*3/mm3      Monocytes, Absolute 0.33 10*3/mm3      Eosinophils, Absolute 0.00 10*3/mm3      Basophils, Absolute 0.03 10*3/mm3      Immature Grans, Absolute 0.33 10*3/mm3      nRBC 0.0 /100 WBC     POC Glucose Once [[id number]]  (Abnormal) Collected:  09/11/19 2035    Specimen:  Blood Updated:  09/11/19 2046     Glucose 230 mg/dL       Comment: : [id number] Lionel Morris ID: [id number]       POC Glucose Once [[id number]]  (Abnormal) Collected:  09/11/19 1602    Specimen:  Blood Updated:  09/11/19 1613     Glucose 207 mg/dL      Comment: : [id number] Alexsander Arandazach ID: [id number]       Blood Culture ID, PCR - Blood, Hand, Left [[id number]]  (Abnormal) Collected:  09/10/19 1144    Specimen:  Blood from Hand, Left Updated:  09/11/19 1147     BCID, PCR Staphylococcus spp, not aureus. Identification by BCID PCR.    POC Glucose Once [[id number]]  (Abnormal) Collected:  09/11/19 1116    Specimen:  Blood Updated:  09/11/19 1127     Glucose 231 mg/dL      Comment: : [id number] Alexsander Arandazach ID: [id number]       POC Glucose Once [[id number]]  (Abnormal) Collected:  09/11/19 0729    Specimen:  Blood Updated:  09/11/19 0740     Glucose 185 mg/dL      Comment: : [id number] Alexsander Arandazach ID: [id number]       Comprehensive Metabolic Panel [[id number]]  (Abnormal) Collected:  09/11/19 0619    Specimen:  Blood Updated:  09/11/19 0706     Glucose 179 mg/dL      BUN 35 mg/dL      Creatinine 1.06 mg/dL      Sodium 138 mmol/L      Potassium 5.0 mmol/L      Chloride 96 mmol/L      CO2 31.0 mmol/L      Calcium 9.7 mg/dL      Total Protein 5.5 g/dL      Albumin 3.70 g/dL      ALT (SGPT) 38 U/L      AST (SGOT) 19 U/L      Alkaline Phosphatase 117 U/L      Total Bilirubin 0.5 mg/dL      eGFR Non African Amer 50 mL/min/1.73      Globulin 1.8 gm/dL      A/G Ratio 2.1 g/dL      BUN/Creatinine Ratio 33.0     Anion Gap 11.0 mmol/L     Narrative:       GFR Normal >60  Chronic Kidney Disease <60  Kidney Failure <15    BNP [[id number]]  (Normal) Collected:  09/11/19 0619    Specimen:  Blood Updated:  09/11/19 0706     proBNP 244.0 pg/mL     Narrative:       Among patients with dyspnea, NT-proBNP is highly sensitive for the detection of acute congestive heart failure. In addition NT-proBNP of <300 pg/ml effectively rules out acute congestive heart failure  with 99% negative predictive value.    CBC & Differential [[id number]] Collected:  09/11/19 0619    Specimen:  Blood Updated:  09/11/19 0654    Narrative:       The following orders were created for panel order CBC & Differential.  Procedure                               Abnormality         Status                     ---------                               -----------         ------                     Manual Differential[[id number]]          Abnormal            Final result               CBC Auto Differential[[id number]]        Abnormal            Final result                 Please view results for these tests on the individual orders.    Manual Differential [[id number]]  (Abnormal) Collected:  09/11/19 0619    Specimen:  Blood Updated:  09/11/19 0654     Neutrophil % 87.0 %      Lymphocyte % 3.0 %      Monocyte % 2.0 %      Bands %  4.0 %      Metamyelocyte % 1.0 %      Myelocyte % 3.0 %      Neutrophils Absolute 11.84 10*3/mm3      Lymphocytes Absolute 0.39 10*3/mm3      Monocytes Absolute 0.26 10*3/mm3      RBC Morphology Normal     WBC Morphology Normal     Platelet Morphology Normal    CBC Auto Differential [[id number]]  (Abnormal) Collected:  09/11/19 0619    Specimen:  Blood Updated:  09/11/19 0654     WBC 13.01 10*3/mm3      RBC 4.79 10*6/mm3      Hemoglobin 15.8 g/dL      Hematocrit 47.4 %      MCV 99.0 fL      MCH 33.0 pg      MCHC 33.3 g/dL      RDW 14.4 %      RDW-SD 52.3 fl      MPV 9.9 fL      Platelets 154 10*3/mm3     Lactic Acid, Plasma [[id number]]  (Abnormal) Collected:  09/11/19 0619    Specimen:  Blood Updated:  09/11/19 0645     Lactate 2.2 mmol/L     Blood Gas, Arterial [[id number]]  (Abnormal) Collected:  09/11/19 0408    Specimen:  Arterial Blood Updated:  09/11/19 0417     Site Right Radial     Maicol's Test Positive     pH, Arterial 7.457 pH units      Comment: 83 Value above reference range        pCO2, Arterial 43.6 mm Hg      pO2, Arterial 84.2 mm Hg      HCO3, Arterial 30.8 mmol/L       Comment: 83 Value above reference range        Base Excess, Arterial 6.0 mmol/L      Comment: 83 Value above reference range        O2 Saturation, Arterial 97.5 %      Temperature 37.0 C      Barometric Pressure for Blood Gas 754 mmHg      Modality Nasal Cannula     FIO2 28 %      Flow Rate 2.0 lpm      Ventilator Mode NA     Collected by [id number]     Comment: Meter: [id number]     :  [id number]       POC Glucose Once [[id number]]  (Abnormal) Collected:  09/10/19 2303    Specimen:  Blood Updated:  09/10/19 2322     Glucose 300 mg/dL      Comment: : [id number] Jenna LiulyMeter ID: [id number]       Lactic Acid, Reflex [[id number]]  (Abnormal) Collected:  09/10/19 1809    Specimen:  Blood Updated:  09/10/19 1834     Lactate 5.4 mmol/L     Lactic Acid, Reflex Timer (This will reflex a repeat order 3-3:15 hours after ordered.) [[id number]] Collected:  09/10/19 1233    Specimen:  Blood Updated:  09/10/19 1604     Extra Tube Hold for add-ons.     Comment: Auto resulted.       [place] Draw [[id number]] Collected:  09/10/19 1155    Specimen:  Blood Updated:  09/10/19 1432    Narrative:       The following orders were created for panel order [place] Draw.  Procedure                               Abnormality         Status                     ---------                               -----------         ------                     Light Blue Top[[id number]]                                   Final result               Green Top (Gel)[[id number]]                                  Final result               Lavender Top[[id number]]                                                                Red Top[[id number]]                                          Final result                 Please view results for these tests on the individual orders.    Lactic Acid, Plasma [[id number]]  (Abnormal) Collected:  09/10/19 1233    Specimen:  Blood Updated:  09/10/19 1304     Lactate 3.1 mmol/L     Red Top [[id number]] Collected:  09/10/19  1155    Specimen:  Blood Updated:  09/10/19 1302     Extra Tube Hold for add-ons.     Comment: Auto resulted.       Light Blue Top [[id number]] Collected:  09/10/19 1155    Specimen:  Blood Updated:  09/10/19 1302     Extra Tube hold for add-on     Comment: Auto resulted       Green Top (Gel) [[id number]] Collected:  09/10/19 1155    Specimen:  Blood Updated:  09/10/19 1302     Extra Tube Hold for add-ons.     Comment: Auto resulted.       CBC & Differential [[id number]] Collected:  09/10/19 1233    Specimen:  Blood Updated:  09/10/19 1248    Narrative:       The following orders were created for panel order CBC & Differential.  Procedure                               Abnormality         Status                     ---------                               -----------         ------                     CBC Auto Differential[[id number]]        Abnormal            Final result                 Please view results for these tests on the individual orders.    CBC Auto Differential [[id number]]  (Abnormal) Collected:  09/10/19 1233    Specimen:  Blood Updated:  09/10/19 1248     WBC 12.94 10*3/mm3      RBC 4.81 10*6/mm3      Hemoglobin 16.1 g/dL      Hematocrit 47.8 %      MCV 99.4 fL      MCH 33.5 pg      MCHC 33.7 g/dL      RDW 14.6 %      RDW-SD 53.9 fl      MPV 10.1 fL      Platelets 140 10*3/mm3      Neutrophil % 84.9 %      Lymphocyte % 9.0 %      Monocyte % 4.1 %      Eosinophil % 0.1 %      Basophil % 0.4 %      Immature Grans % 1.5 %      Neutrophils, Absolute 10.99 10*3/mm3      Lymphocytes, Absolute 1.16 10*3/mm3      Monocytes, Absolute 0.53 10*3/mm3      Eosinophils, Absolute 0.01 10*3/mm3      Basophils, Absolute 0.05 10*3/mm3      Immature Grans, Absolute 0.20 10*3/mm3      nRBC 0.2 /100 WBC     Comprehensive Metabolic Panel [[id number]]  (Abnormal) Collected:  09/10/19 1155    Specimen:  Blood Updated:  09/10/19 1223     Glucose 215 mg/dL      BUN 35 mg/dL      Creatinine 0.94 mg/dL      Sodium 139 mmol/L       Potassium 4.1 mmol/L      Chloride 99 mmol/L      CO2 29.0 mmol/L      Calcium 9.2 mg/dL      Total Protein 5.8 g/dL      Albumin 3.40 g/dL      ALT (SGPT) 42 U/L      AST (SGOT) 27 U/L      Alkaline Phosphatase 118 U/L      Total Bilirubin 0.6 mg/dL      eGFR Non African Amer 57 mL/min/1.73      Globulin 2.4 gm/dL      A/G Ratio 1.4 g/dL      BUN/Creatinine Ratio 37.2     Anion Gap 11.0 mmol/L     Narrative:       GFR Normal >60  Chronic Kidney Disease <60  Kidney Failure <15    BNP [[id number]]  (Normal) Collected:  09/10/19 1155    Specimen:  Blood Updated:  09/10/19 1223     proBNP 128.1 pg/mL     Narrative:       Among patients with dyspnea, NT-proBNP is highly sensitive for the detection of acute congestive heart failure. In addition NT-proBNP of <300 pg/ml effectively rules out acute congestive heart failure with 99% negative predictive value.    Troponin [[id number]]  (Normal) Collected:  09/10/19 1155    Specimen:  Blood Updated:  09/10/19 1223     Troponin T 0.013 ng/mL     Narrative:       Troponin T Reference Range:  <= 0.03 ng/mL-   Negative for AMI  >0.03 ng/mL-     Abnormal for myocardial necrosis.  Clinicians would have to utilize clinical acumen, EKG, Troponin and serial changes to determine if it is an Acute Myocardial Infarction or myocardial injury due to an underlying chronic condition.     Protime-INR [[id number]]  (Abnormal) Collected:  09/10/19 1155    Specimen:  Blood Updated:  09/10/19 1215     Protime 12.0 Seconds      INR 0.86    aPTT [[id number]]  (Abnormal) Collected:  09/10/19 1155    Specimen:  Blood Updated:  09/10/19 1215     PTT 22.4 seconds     D-dimer, Quantitative [[id number]]  (Abnormal) Collected:  09/10/19 1155    Specimen:  Blood Updated:  09/10/19 1215     D-Dimer, Quantitative 3.83 mg/L (FEU)     Narrative:       Reference Range is 0-0.50 mg/L FEU. However, results <0.50 mg/L FEU tends to rule out DVT or PE. Results >0.50 mg/L FEU are not useful in predicting absence or  "presence of DVT or PE.    Blood Gas, Arterial [[id number]]  (Abnormal) Collected:  09/10/19 1155    Specimen:  Arterial Blood Updated:  09/10/19 1204     Site Left Brachial     Maicol's Test N/A     pH, Arterial 7.495 pH units      Comment: 83 Value above reference range        pCO2, Arterial 36.9 mm Hg      pO2, Arterial 72.5 mm Hg      Comment: 84 Value below reference range        HCO3, Arterial 28.4 mmol/L      Comment: 83 Value above reference range        Base Excess, Arterial 5.1 mmol/L      Comment: 83 Value above reference range        O2 Saturation, Arterial 96.5 %      Temperature 37.0 C      Barometric Pressure for Blood Gas 755 mmHg      Modality Room Air     FIO2 21 %      Ventilator Mode NA     Collected by [id number]     Comment: Meter: [id number]     :  [id number]               Objective:     Vitals: /88   Pulse 87   Temp 98.9 °F (37.2 °C) (Oral)   Resp 16   Ht 154.9 cm (61\")   Wt 111 kg (244 lb 14.9 oz)   SpO2 95%   BMI 46.28 kg/m²    Intake/Output Summary (Last 24 hours) at 2019 0717  Last data filed at 2019 1939  Gross per 24 hour   Intake 240 ml   Output 1150 ml   Net -910 ml    Temp (24hrs), Av.5 °F (36.9 °C), Min:97.3 °F (36.3 °C), Max:99 °F (37.2 °C)        Physical Examination:   General appearance - alert, well appearing, and in no distress and oriented to person, place, and time  Mental status - alert, oriented to person, place, and time, normal mood, behavior, speech, dress, motor activity, and thought processes  Eyes - pupils equal and reactive, extraocular eye movements intact  Ears - bilateral TM's and external ear canals normal, hearing grossly normal bilaterally  Mouth - mucous membranes moist, pharynx normal without lesions  Neck - supple, no significant adenopathy  Lymphatics - no palpable lymphadenopathy, no hepatosplenomegaly  Chest - Diminished in bases bilateral, bilateral wheeze  Heart - normal rate, regular rhythm, normal S1, S2, no murmurs, " rubs, clicks or gallops  Abdomen - soft, nontender, nondistended, no masses or organomegaly bowel sounds normal  Neurological - alert, oriented, normal speech, no focal findings or movement disorder noted  Musculoskeletal - no joint tenderness, deformity or swelling  Extremities - chronic lower ext edema  Skin - normal coloration and turgor, no rashes, no suspicious skin lesions noted      Assessment and Plan:     Primary Problem:  ASTHMA  COPD  CONTAMINATED BLOOD CX    Hospital Problem list:    Moderate persistent asthma with acute exacerbation      PMH:  Past Medical History:   Diagnosis Date   • Asthma    • Cellulitis    • CHF (congestive heart failure) (CMS/HCC)    • Diabetes mellitus (CMS/HCC)    • Disease of thyroid gland    • Hypercholesteremia    • Hypertension    • Hypokalemia    • Kidney cysts    • Sleep apnea    • Unsteady gait        Treatment Plan:  CONTINUE PULM TOILET  WEAN STEROID SLOWLY  REPEAT BLOOD CULTURES (AFEBRILE/NON-TOXIC)    Discharge planning:   Nursing Home    Reviewed treatment plans with the patient and/or family.   20 minutes spent in face to face interaction and coordination of care.     Electronically signed by Ken Brady MD on 9/13/2019 at 7:17 AM

## 2019-09-13 NOTE — CONSULTS
PULMONARY & CRITICAL CARE CONSULT - [facility]    19, 12:40 PM  Patient Care Team:  Ken Brady MD as PCP - General  Ken Brady MD as PCP - Family Medicine  Name: Carlotta Ortiz  : 1940  MRN: [id number]  Contact Serial Number [id number]    Chief complaint: Dyspnea.  HPI:  We have been consulted by Ken Brady MD to see this 79 y.o. female born on 1940.  The patient has a long history of asthma with some associated moderate airflow obstruction by prior pulmonary function studies in our office.  She had been seen by Dr. Bah in the past and also by Dr. Yu myself a few years ago but not recently.  She currently resides at [facility] and is admitted from [facility] because of an asthma exacerbation which had worsened over the past few days.  She has a known pleural-based right lung mass which has been followed conservatively.  It actually appears to show a slight decrease in size compared to previous scans.  This is almost certainly benign lesion.  She does have sleep apnea but is been intolerant of a positive airway pressure device.  She has been on nocturnal oxygen but per her daughter at the bedside the nursing home was not allowing her to wear it routinely at least recently.  Past Medical History:   has a past medical history of Asthma, Cellulitis, CHF (congestive heart failure) (CMS/HCC), Diabetes mellitus (CMS/[facility]), Disease of thyroid gland, Hypercholesteremia, Hypertension, Hypokalemia, Kidney cysts, Sleep apnea, and Unsteady gait.   has a past surgical history that includes Tonsillectomy; Adenoidectomy; Tubal ligation; Cholecystectomy; and Ankle surgery (Left).  Allergies   Allergen Reactions   • Aminophylline Unknown (See Comments)   • Cephalexin Unknown (See Comments)   • Spiractone [Spironolactone] Nausea And Vomiting   • Sulfa Antibiotics Unknown (See Comments)   • Sulfacetamide Sodium Unknown (See Comments)   • Ampicillin Rash    • Penicillins Rash     Medications:    bumetanide 2 mg Oral BID   enoxaparin 40 mg Subcutaneous Q24H   insulin lispro 2-7 Units Subcutaneous 4x Daily With Meals & Nightly   ipratropium-albuterol 3 mL Nebulization Q6H - RT   lansoprazole 30 mg Oral Q AM   levoFLOXacin 500 mg Intravenous Q24H   levothyroxine 125 mcg Oral Q AM   methylPREDNISolone sodium succinate 40 mg Intravenous Q12H   montelukast 10 mg Oral Nightly   nystatin 1 application Topical TID   polyethylene glycol 17 g Oral Daily   potassium chloride 10 mEq Oral BID With Meals   primidone 12.5 mg Oral Nightly   senna 2 tablet Oral Daily   sodium chloride 10 mL Intravenous Q12H   spironolactone 50 mg Oral Daily        Family History:  History reviewed. No pertinent family history.  Social History:   reports that she has never smoked. She has never used smokeless tobacco. She reports that she does not drink alcohol or use drugs.  Review of Systems:  Review of Systems   Constitutional: Positive for fatigue and fever.   HENT: Negative.    Respiratory: Positive for cough, shortness of breath and wheezing.    Cardiovascular: Positive for leg swelling.   Gastrointestinal: Negative.    Genitourinary: Negative.    Musculoskeletal: Negative.    Skin: Positive for rash.   Neurological: Negative.    Psychiatric/Behavioral: Negative.       Physical Exam:  Temp:  [97.7 °F (36.5 °C)-99 °F (37.2 °C)] 97.7 °F (36.5 °C)  Heart Rate:  [] 103  Resp:  [16-18] 18  BP: (123-142)/(58-88) 126/76    Intake/Output Summary (Last 24 hours) at 9/13/2019 1240  Last data filed at 9/12/2019 1939  Gross per 24 hour   Intake 240 ml   Output 1150 ml   Net -910 ml         09/10/19  1125 09/10/19  1742 09/11/19  1936   Weight: 108 kg (238 lb) 109 kg (239 lb 10.2 oz) 111 kg (244 lb 14.9 oz)     SpO2 Percentage    09/13/19 0647 09/13/19 0745 09/13/19 1057   SpO2: 95% 97% 94%     Physical Exam   Constitutional: She appears well-developed.   She is a morbidly obese white female.   HENT:    O2 is in place via nasal cannula.   Eyes: EOM are normal. Pupils are equal, round, and reactive to light.   Neck:   Her neck is thick but supple.   Cardiovascular:   She is mildly tachycardic with distant heart sounds.   Pulmonary/Chest:   Breath sounds are quite diminished with a few expiratory rhonchi and wheezes at the bases.   Abdominal:   Her abdomen is obese.   Musculoskeletal: She exhibits edema.   She has 2+ edema of the calves.   Skin:   She has some ecchymoses on extremities.   Psychiatric: She has a normal mood and affect.   Nursing note and vitals reviewed.    Results from last 7 days   Lab Units 09/12/19  0354 09/11/19  0619 09/10/19  1233   WBC 10*3/mm3 14.37* 13.01* 12.94*   HEMOGLOBIN g/dL 15.9 15.8 16.1*   PLATELETS 10*3/mm3 163 154 140     Results from last 7 days   Lab Units 09/12/19  0354 09/11/19  0619 09/10/19  1155   SODIUM mmol/L 137 138 139   POTASSIUM mmol/L 4.8 5.0 4.1   CO2 mmol/L 29.0 31.0* 29.0   BUN mg/dL 35* 35* 35*   CREATININE mg/dL 1.06* 1.06* 0.94   GLUCOSE mg/dL 201* 179* 215*     Results from last 7 days   Lab Units 09/11/19  0408 09/10/19  1155   PH, ARTERIAL pH units 7.457* 7.495*   PCO2, ARTERIAL mm Hg 43.6 36.9   PO2 ART mm Hg 84.2 72.5*   FIO2 % 28 21     Lab Results   Component Value Date    PROBNP 244.0 09/11/2019     Blood Culture   Date Value Ref Range Status   09/10/2019 No growth at 3 days  Preliminary   09/10/2019 Staphylococcus, coagulase negative (A)  Final     Comment:     Probable contaminant requires clinical correlation, susceptibility not performed unless requested by physician.       Recent radiology:   Imaging Results (last 72 hours)     Procedure Component Value Units Date/Time    XR Chest PA & Lateral [[id number]] Collected:  09/10/19 2142     Updated:  09/10/19 2147    Narrative:       EXAMINATION:   XR CHEST PA AND LATERAL-  9/10/2019 9:42 PM CDT     HISTORY: Upright frontal and lateral radiographs of the chest 9/10/2019  9:07 PM CDT     HISTORY:  Asthma     COMPARISON: 09/10/2019.     FINDINGS:   The lungs are clear. Pleural-based lesions again noted in the right  chest. Cardiac silhouettes enlarged this is unchanged. The osseous  structures and surrounding soft tissues demonstrate no acute  abnormality.       Impression:       1. COPD with hyperinflation flattening diaphragms. This compared to CT  scan and prior chest exam of the same date  2. Pleural-based lesion in the right chest is again noted.        This report was finalized on 09/10/2019 21:44 by Dr. Otilio Rutledge MD.    CT Angiogram Chest With Contrast [[id number]] Collected:  09/10/19 1540     Updated:  09/10/19 1550    Narrative:       EXAM: CT ANGIOGRAM CHEST W CONTRAST- - 9/10/2019 3:20 PM CDT     HISTORY: Chest pain, acute, PE suspected, intermed prob, positive  D-dimer       COMPARISON: 03/04/2018.      DOSE LENGTH PRODUCT: 498 mGy cm. Automatic exposure control was utilized  to make radiation dose as low as reasonably achievable.     TECHNIQUE: Enhanced axial CT images obtained with multiplanar reformats.  3D postprocessing, including MIPs, performed and images saved to PACS.     FINDINGS:   AIRWAYS/PULMONARY PARENCHYMA: The central airways are midline and  patent. No focal consolidation. Along the right fissure there is a 3.4 x  1.5 cm round pleural-based fluid density, which was also present  03/24/2018 measuring 4.8 x 2.2 cm. Mild linear opacity in the lung  bases, likely atelectasis.     VESSELS: Contrast bolus is adequate. No pulmonary artery filling defect  to suggest pulmonary embolus. Similar kinked appearance of the right  upper lobe pulmonary artery. Aorta normal in course and caliber with  calcified atherosclerosis. Medialization of the bilateral common carotid  arteries.      CARDIAC:  Borderline heart size. Trace pericardial fluid. Scattered  coronary artery calcifications.     MEDIASTINUM: No adenopathy. Esophagus has normal coarse, caliber and  wall thickness.      EXTRATHORACIC: Thyroid diminutive or absent. No thoracic inlet or  axillary adenopathy. Partially visualized breast tissue grossly  symmetric. The extrathoracic soft tissues are within normal limits.      INCLUDED UPPER ABDOMEN: Cholecystectomy clips. Visualized portion of the  liver, pancreas, spleen, adrenal glands are within normal limits.     OSSEOUS: Diffusely decreased bone mineralization. Flowing vertebral body  osteophytes consistent with diffuse idiopathic skeletal hyperostosis  (DISH). No acute bony finding.           Impression:       1. No acute cardiopulmonary finding. Specifically, no evidence of  pulmonary embolism.  2. Decreased size of right pleural-based oval mass, compared to  03/24/2018, and was also present 06/28/2016.  3. Coronary artery and aortic calcified atherosclerosis.  4. Thyroid diminutive or absent, correlate with patient history.  5. Cholecystectomy clips.  This report was finalized on 09/10/2019 15:47 by Dr Catrachita Vance MD.        My radiograph interpretation/independent review of imaging: CT angiogram does demonstrate a decrease in the size of a pleural-based mass on the right.  No acute infiltrates were noted and no evidence of pulmonary embolism was present.  Other test results (not lab or imaging): Results for orders placed during the hospital encounter of 03/24/18   Adult Stress Echo W/ Cont or Stress Agent if Necessary Per Protocol    Narrative Low risk for ischemia   Prior PFTs from the office have shown a moderate obstructive ventilatory defect.  Independent review of ekg: Normal sinus rhythm with left anterior fascicular block and left ventricular hypertrophy with QRS widening is present.  PACs are also noted.  Problem List as identified by Epic (may contain historical, inactive problems)  Patient Active Problem List   Diagnosis   • Asthma attack   • Chest pain   • Cellulitis   • Moderate persistent asthma with acute exacerbation     Pulmonary Assessment:  New problem  (to me), with additional workup planned:  1.  Acute exacerbation of asthma.  New problem (to me), no additional workup planned:  1.  None.  Other problems either stable, failing to improve or worsenin. Obstructive sleep apnea, intolerant of a positive airway pressure device.    Recommend/plan:   · She appears fairly stable at this time and probably could be transitioned to oral medications soon.  If there is a question about need for nocturnal oxygen therapy particularly if she will not wear a CPAP device, we can get an overnight pulse ox study on room air prior to discharge.    Thank you for this consult.  We will follow along.  Electronically signed by Molina Zhao MD, 19, 12:40 PM.

## 2019-09-13 NOTE — PROGRESS NOTES
Continued Stay Note   [place]     Patient Name: Carlotta Ortiz  MRN: [id number]  Today's Date: 9/13/2019    Admit Date: 9/10/2019    Discharge Plan     Row Name 09/13/19 1618       Plan    Plan  [facility]    Patient/Family in Agreement with Plan  yes    Plan Comments  Lizzy from [facility] has offered pt a bed there. Pt can go there at d/c. Will follow for d/c date. [phone]    Row Name 09/13/19 5224       Plan    Plan  Referral to [facility]    Patient/Family in Agreement with Plan  yes    Plan Comments  Pt's dtr wishes to move pt from [facility] to [facility].  Informed Lizzy Barroso from [place] of referral [phone]. Will inform if pt offered.         Discharge Codes    No documentation.             BRYAN Silva

## 2019-09-13 NOTE — PROGRESS NOTES
Continued Stay Note  [facility]     Patient Name: Carlotta Ortiz  MRN: [id number]  Today's Date: 9/13/2019    Admit Date: 9/10/2019    Discharge Plan     Row Name 09/13/19 0856       Plan    Plan  [facility]    Patient/Family in Agreement with Plan  yes    Plan Comments  Pt resides at [facility]. Spoke with Kathryn there and bed held for pt's return at [facility] level care.  [phone]        Discharge Codes    No documentation.             BRYAN Silva

## 2019-09-13 NOTE — PROGRESS NOTES
Continued Stay Note  [facility]     Patient Name: Carlotta Ortiz  MRN: [id number]  Today's Date: 9/13/2019    Admit Date: 9/10/2019    Discharge Plan     Row Name 09/13/19 1435       Plan    Plan  Referral to [facility]    Patient/Family in Agreement with Plan  yes    Plan Comments  Pt's dtr wishes to move pt from [facility] to [facility].  Informed Lizzy Barroso from [place] of referral [phone]. Will inform if bed offered.         Discharge Codes    No documentation.             BRYAN Silva

## 2019-09-13 NOTE — PLAN OF CARE
Problem: Patient Care Overview  Goal: Plan of Care Review  Outcome: Ongoing (interventions implemented as appropriate)   09/13/19 9451   Coping/Psychosocial   Plan of Care Reviewed With patient   OTHER   Outcome Summary Patient had moderate, hard, brown stool just prior to shift change. C/O of occasional congested, non-productive cough but reports improving WEBB. Pure wick in place with clear, yellow urine and good out put. PRN medication for heartburn/indigestion given with relief. Safety maintained.   Plan of Care Review   Progress no change       Problem: Fall Risk (Adult)  Goal: Absence of Fall  Outcome: Ongoing (interventions implemented as appropriate)      Problem: Breathing Pattern Ineffective (Adult)  Goal: Effective Oxygenation/Ventilation  Outcome: Ongoing (interventions implemented as appropriate)    Goal: Anxiety/Fear Reduction  Outcome: Ongoing (interventions implemented as appropriate)      Problem: Skin Injury Risk (Adult)  Goal: Skin Health and Integrity  Outcome: Ongoing (interventions implemented as appropriate)

## 2019-09-14 PROBLEM — E03.9 HYPOTHYROIDISM (ACQUIRED): Chronic | Status: ACTIVE | Noted: 2019-09-14

## 2019-09-14 PROBLEM — I10 ESSENTIAL HYPERTENSION: Chronic | Status: ACTIVE | Noted: 2019-09-14

## 2019-09-14 PROBLEM — E11.9 TYPE 2 DIABETES MELLITUS (HCC): Chronic | Status: ACTIVE | Noted: 2019-09-14

## 2019-09-14 LAB
GLUCOSE BLDC GLUCOMTR-MCNC: 114 MG/DL (ref 70–130)
GLUCOSE BLDC GLUCOMTR-MCNC: 175 MG/DL (ref 70–130)
GLUCOSE BLDC GLUCOMTR-MCNC: 188 MG/DL (ref 70–130)
GLUCOSE BLDC GLUCOMTR-MCNC: 215 MG/DL (ref 70–130)

## 2019-09-14 PROCEDURE — 94799 UNLISTED PULMONARY SVC/PX: CPT

## 2019-09-14 PROCEDURE — 25010000002 ENOXAPARIN PER 10 MG: Performed by: NURSE PRACTITIONER

## 2019-09-14 PROCEDURE — 25010000002 LEVOFLOXACIN PER 250 MG: Performed by: FAMILY MEDICINE

## 2019-09-14 PROCEDURE — 25010000002 METHYLPREDNISOLONE PER 40 MG: Performed by: FAMILY MEDICINE

## 2019-09-14 PROCEDURE — 63710000001 INSULIN LISPRO (HUMAN) PER 5 UNITS: Performed by: NURSE PRACTITIONER

## 2019-09-14 PROCEDURE — 94760 N-INVAS EAR/PLS OXIMETRY 1: CPT

## 2019-09-14 PROCEDURE — 63710000001 PREDNISONE PER 1 MG: Performed by: INTERNAL MEDICINE

## 2019-09-14 PROCEDURE — 82962 GLUCOSE BLOOD TEST: CPT

## 2019-09-14 RX ORDER — PREDNISONE 20 MG/1
20 TABLET ORAL 2 TIMES DAILY WITH MEALS
Status: COMPLETED | OUTPATIENT
Start: 2019-09-14 | End: 2019-09-15

## 2019-09-14 RX ADMIN — LEVOTHYROXINE SODIUM 125 MCG: 125 TABLET ORAL at 06:39

## 2019-09-14 RX ADMIN — SENNOSIDES 17.2 MG: 8.6 TABLET, FILM COATED ORAL at 08:06

## 2019-09-14 RX ADMIN — LANSOPRAZOLE 30 MG: KIT at 08:06

## 2019-09-14 RX ADMIN — BUMETANIDE 2 MG: 2 TABLET ORAL at 06:39

## 2019-09-14 RX ADMIN — IPRATROPIUM BROMIDE AND ALBUTEROL SULFATE 3 ML: 2.5; .5 SOLUTION RESPIRATORY (INHALATION) at 07:47

## 2019-09-14 RX ADMIN — CALCIUM CARBONATE 1 TABLET: 500 TABLET, CHEWABLE ORAL at 04:30

## 2019-09-14 RX ADMIN — BUMETANIDE 2 MG: 2 TABLET ORAL at 17:37

## 2019-09-14 RX ADMIN — IPRATROPIUM BROMIDE AND ALBUTEROL SULFATE 3 ML: 2.5; .5 SOLUTION RESPIRATORY (INHALATION) at 19:28

## 2019-09-14 RX ADMIN — NYSTATIN 1 APPLICATION: 100000 POWDER TOPICAL at 21:12

## 2019-09-14 RX ADMIN — INSULIN LISPRO 3 UNITS: 100 INJECTION, SOLUTION INTRAVENOUS; SUBCUTANEOUS at 17:37

## 2019-09-14 RX ADMIN — POTASSIUM CHLORIDE 10 MEQ: 10 CAPSULE, COATED, EXTENDED RELEASE ORAL at 08:05

## 2019-09-14 RX ADMIN — SODIUM CHLORIDE, PRESERVATIVE FREE 10 ML: 5 INJECTION INTRAVENOUS at 10:25

## 2019-09-14 RX ADMIN — ENOXAPARIN SODIUM 40 MG: 40 INJECTION SUBCUTANEOUS at 21:11

## 2019-09-14 RX ADMIN — METHYLPREDNISOLONE SODIUM SUCCINATE 40 MG: 40 INJECTION, POWDER, FOR SOLUTION INTRAMUSCULAR; INTRAVENOUS at 08:06

## 2019-09-14 RX ADMIN — INSULIN LISPRO 2 UNITS: 100 INJECTION, SOLUTION INTRAVENOUS; SUBCUTANEOUS at 21:11

## 2019-09-14 RX ADMIN — SPIRONOLACTONE 50 MG: 50 TABLET, FILM COATED ORAL at 08:05

## 2019-09-14 RX ADMIN — NYSTATIN 1 APPLICATION: 100000 POWDER TOPICAL at 17:39

## 2019-09-14 RX ADMIN — PRIMIDONE 12.5 MG: 50 TABLET ORAL at 21:10

## 2019-09-14 RX ADMIN — SODIUM CHLORIDE, PRESERVATIVE FREE 10 ML: 5 INJECTION INTRAVENOUS at 21:11

## 2019-09-14 RX ADMIN — POTASSIUM CHLORIDE 10 MEQ: 10 CAPSULE, COATED, EXTENDED RELEASE ORAL at 17:37

## 2019-09-14 RX ADMIN — MONTELUKAST SODIUM 10 MG: 10 TABLET, FILM COATED ORAL at 21:11

## 2019-09-14 RX ADMIN — NYSTATIN 1 APPLICATION: 100000 POWDER TOPICAL at 08:05

## 2019-09-14 RX ADMIN — LEVOFLOXACIN 500 MG: 5 INJECTION, SOLUTION INTRAVENOUS at 08:06

## 2019-09-14 RX ADMIN — PREDNISONE 20 MG: 20 TABLET ORAL at 17:36

## 2019-09-14 RX ADMIN — INSULIN LISPRO 2 UNITS: 100 INJECTION, SOLUTION INTRAVENOUS; SUBCUTANEOUS at 12:33

## 2019-09-14 RX ADMIN — POLYETHYLENE GLYCOL 3350 17 G: 17 POWDER, FOR SOLUTION ORAL at 08:06

## 2019-09-14 NOTE — PROGRESS NOTES
"  PULMONARY PROGRESS NOTE  Patient Name: Carlotta Ortiz  Age/Sex: 79 y.o. female  : 1940  MRN: [id number]    Date of Admission: 9/10/2019  Date of Encounter Visit: 19   LOS: 1 day   Patient Care Team:  Ken Brady MD as PCP - General  Ken Brady MD as PCP - Family Medicine    Chief Complaint: Improving breathing with sore tongue    Hospital course: Patient is being treated for acute asthma exacerbation, she is doing better on the IV Solu-Medrol, she has a chest x-ray that showed no obvious disease, she is afebrile, she is on IV steroids  Complaining of sores over the bottom part of her tongue on the right side.  She feels better but not back to baseline yet    Interval History: As per above    REVIEW OF SYSTEMS:   CONSTITUTIONAL: no fever or chills  CARDIOVASCULAR: No chest pain, chest pressure or chest discomfort. No palpitations or edema.   RESPIRATORY: Improving shortness of breath, minimal productive cough.   GASTROINTESTINAL: No anorexia, nausea, vomiting or diarrhea. No abdominal pain or blood.   HEMATOLOGIC: No bleeding or bruising.     Ventilator/Non-Invasive Ventilation Settings (From admission, onward)    None            Vital Signs  Temp:  [98 °F (36.7 °C)-99 °F (37.2 °C)] 98 °F (36.7 °C)  Heart Rate:  [] 92  Resp:  [18-20] 18  BP: (123-141)/(64-84) 128/67  SpO2:  [89 %-95 %] 95 %  on  Flow (L/min):  [1-2.5] 2.5 Device (Oxygen Therapy): nasal cannula    Intake/Output Summary (Last 24 hours) at 2019 1158  Last data filed at 2019 0355  Gross per 24 hour   Intake --   Output 1750 ml   Net -1750 ml     Flowsheet Rows      First Filed Value   Admission Height  154.9 cm (61\") Documented at 09/10/2019 1125   Admission Weight  108 kg (238 lb) Documented at 09/10/2019 1125        Body mass index is 46.52 kg/m².      09/10/19  1742 09/11/19  1936 09/13/19  1933   Weight: 109 kg (239 lb 10.2 oz) 111 kg (244 lb 14.9 oz) 112 kg (246 lb 3.2 oz)       Physical Exam:  GEN:  " No acute distress, alert, cooperative, well developed, on nasal cannula oxygen  EYES:   Sclera clear. No icterus. PERRL. Normal EOM  ENT:   External ears/nose normal, no oral lesions, no thrush, mucous membranes moist, she does have a lesion on the bottom part of the tongue on the right side that looks like a traumatic area of ecchymosis with no significant hematoma  NECK:  Supple, midline trachea, no JVD  LUNGS: Normal chest on inspection, CTAB, no wheezes. No rhonchi. No crackles. Respirations regular, even and unlabored.   CV:  Regular rhythm and rate. Normal S1/S2. No murmurs, gallops, or rubs noted.  ABD:  Soft, non-tender and non-distended. Normal bowel sounds. No guarding  EXT:  Moves all extremities well. No cyanosis. No redness.  Minimal nonpitting  edema,.   Skin: dry, intact, no bleeding    Results Review:      Results from last 7 days   Lab Units 09/12/19  0354 09/11/19  0619 09/10/19  1155   SODIUM mmol/L 137 138 139   POTASSIUM mmol/L 4.8 5.0 4.1   CHLORIDE mmol/L 96* 96* 99   CO2 mmol/L 29.0 31.0* 29.0   BUN mg/dL 35* 35* 35*   CREATININE mg/dL 1.06* 1.06* 0.94   CALCIUM mg/dL 9.6 9.7 9.2   AST (SGOT) U/L  --  19 27   ALT (SGPT) U/L  --  38* 42*   ANION GAP mmol/L 12.0 11.0 11.0   ALBUMIN g/dL  --  3.70 3.40*     Results from last 7 days   Lab Units 09/10/19  1155   TROPONIN T ng/mL 0.013         Results from last 7 days   Lab Units 09/11/19  0619 09/10/19  1155   PROBNP pg/mL 244.0 128.1     Results from last 7 days   Lab Units 09/12/19  0354 09/11/19  0619 09/10/19  1233   WBC 10*3/mm3 14.37* 13.01* 12.94*   HEMOGLOBIN g/dL 15.9 15.8 16.1*   HEMATOCRIT % 47.0* 47.4* 47.8*   PLATELETS 10*3/mm3 163 154 140   MCV fL 97.9* 99.0* 99.4*   NEUTROPHIL % % 92.8*  --  84.9*   LYMPHOCYTE % % 2.4*  --  9.0*   MONOCYTES % % 2.3*  --  4.1*   EOSINOPHIL % % 0.0*  --  0.1*   BASOPHIL % % 0.2  --  0.4   IMM GRAN % % 2.3*  --  1.5*     Results from last 7 days   Lab Units 09/10/19  1155   INR  0.86*   APTT seconds  22.4*               Invalid input(s): LDLCALC  Results from last 7 days   Lab Units 09/11/19  0408 09/10/19  1155   PH, ARTERIAL pH units 7.457* 7.495*   PCO2, ARTERIAL mm Hg 43.6 36.9   PO2 ART mm Hg 84.2 72.5*   HCO3 ART mmol/L 30.8* 28.4*         Glucose   Date/Time Value Ref Range Status   09/14/2019 1104 175 (H) 70 - 130 mg/dL Final     Comment:     : [id number] Ruthie MooreMeter ID: [id number]   09/14/2019 0740 114 70 - 130 mg/dL Final     Comment:     : [id number] Ruthie DownsleyMeter ID: [id number]   09/13/2019 2154 225 (H) 70 - 130 mg/dL Final     Comment:     : [id number] Tevin KiraMeter ID: [id number]   09/13/2019 1730 232 (H) 70 - 130 mg/dL Final     Comment:     : [id number] Shad ForteferMeter ID: [id number]   09/13/2019 1212 166 (H) 70 - 130 mg/dL Final     Comment:     : [id number] Georgi EscuderociMeter ID: [id number]   09/13/2019 0746 171 (H) 70 - 130 mg/dL Final     Comment:     : [id number] Georgi EscuderociMeter ID: [id number]   09/12/2019 2104 205 (H) 70 - 130 mg/dL Final     Comment:     : [id number] Dalilakeesha Morris ID: [id number]   09/12/2019 1605 190 (H) 70 - 130 mg/dL Final     Comment:     : [id number] Alexsander Brand ID: [id number]     Results from last 7 days   Lab Units 09/12/19  0354 09/11/19  0619 09/10/19  1809 09/10/19  1233   LACTATE mmol/L 1.6 2.2* 5.4* 3.1*     Results from last 7 days   Lab Units 09/10/19  1154 09/10/19  1144   BLOODCX  No growth at 3 days Staphylococcus, coagulase negative*   BCIDPCR   --  Staphylococcus spp, not aureus. Identification by BCID PCR.*                   Imaging:   Imaging Results (all)     Procedure Component Value Units Date/Time    XR Chest PA & Lateral [[id number]] Collected:  09/10/19 2142     Updated:  09/10/19 2147    Narrative:       EXAMINATION:   XR CHEST PA AND LATERAL-  9/10/2019 9:42 PM CDT     HISTORY: Upright frontal and lateral radiographs of the chest 9/10/2019  9:07 PM CDT     HISTORY: Asthma      COMPARISON: 09/10/2019.     FINDINGS:   The lungs are clear. Pleural-based lesions again noted in the right  chest. Cardiac silhouettes enlarged this is unchanged. The osseous  structures and surrounding soft tissues demonstrate no acute  abnormality.       Impression:       1. COPD with hyperinflation flattening diaphragms. This compared to CT  scan and prior chest exam of the same date  2. Pleural-based lesion in the right chest is again noted.        This report was finalized on 09/10/2019 21:44 by Dr. Otilio Rutledge MD.       Cheston showed some pleural-based masslike structure that is likely from fluid in the subpleural space, no different compared to prior images  Medication Review:     bumetanide 2 mg Oral BID   enoxaparin 40 mg Subcutaneous Q24H   insulin lispro 2-7 Units Subcutaneous 4x Daily With Meals & Nightly   ipratropium-albuterol 3 mL Nebulization Q6H - RT   lansoprazole 30 mg Oral Q AM   levoFLOXacin 500 mg Intravenous Q24H   levothyroxine 125 mcg Oral Q AM   methylPREDNISolone sodium succinate 40 mg Intravenous Q12H   montelukast 10 mg Oral Nightly   nystatin 1 application Topical TID   polyethylene glycol 17 g Oral Daily   potassium chloride 10 mEq Oral BID With Meals   primidone 12.5 mg Oral Nightly   senna 2 tablet Oral Daily   sodium chloride 10 mL Intravenous Q12H   spironolactone 50 mg Oral Daily            ASSESSMENT:   Asthma with acute exacerbation  Obstructive sleep apnea with intolerance to CPAP at home  Sleep-related hypoxemia   Coagulase-negative staph on 1 out of 2 blood cultures, likely skin pathogen contaminant  Stable subpleural mass/fluid density  Tongue ecchymosis    PLAN:  The discomfort on the base of the tongue is probably from biting her tongue earlier, there is a small area of ecchymosis, I would not even call it hematoma.  Expect that to improve by itself, this is not a thrush related problem  From the respiratory standpoint she is doing better and feeling better, the  lung exam is very reassuring  We will discontinue the IV Solu-Medrol and switch to p.o. prednisone and continue to wean down as tolerated  Coagulase-negative Staphylococcus on her blood culture, 1 out of 2, this is likely a contaminant    Discussed with discussed with the patient in detail    Disposition:     Juan Olea MD  09/14/19  11:58 AM           Dictated utilizing Dragon dictation

## 2019-09-14 NOTE — PLAN OF CARE
Problem: Patient Care Overview  Goal: Plan of Care Review  Outcome: Ongoing (interventions implemented as appropriate)   09/14/19 0442   Coping/Psychosocial   Plan of Care Reviewed With patient   OTHER   Outcome Summary VSS. No c/o pain. C/O indigestion. Tums given as ordered. O2 @ 2.5L continues. Discharge planning ongoing. Safety maintained. Call light within reach. Will continue to monitor.    Plan of Care Review   Progress improving     Goal: Individualization and Mutuality  Outcome: Ongoing (interventions implemented as appropriate)   09/14/19 0442   Individualization   Patient Specific Goals (Include Timeframe) Wishes to be discharged to [place] [facility] and not [facility].   Patient Specific Interventions Discharge planning ongoing.     Goal: Interprofessional Rounds/Family Conf  Outcome: Ongoing (interventions implemented as appropriate)   09/14/19 0442   Interdisciplinary Rounds/Family Conf   Participants nursing;patient       Problem: Fall Risk (Adult)  Goal: Identify Related Risk Factors and Signs and Symptoms  Outcome: Ongoing (interventions implemented as appropriate)   09/14/19 0442   Fall Risk (Adult)   Related Risk Factors (Fall Risk) gait/mobility problems;age-related changes;bladder function altered;fatigue/slow reaction;environment unfamiliar   Signs and Symptoms (Fall Risk) presence of risk factors     Goal: Absence of Fall  Outcome: Ongoing (interventions implemented as appropriate)   09/14/19 0442   Fall Risk (Adult)   Absence of Fall making progress toward outcome       Problem: Breathing Pattern Ineffective (Adult)  Goal: Effective Oxygenation/Ventilation  Outcome: Ongoing (interventions implemented as appropriate)   09/14/19 0442   Breathing Pattern Ineffective (Adult)   Effective Oxygenation/Ventilation making progress toward outcome     Goal: Anxiety/Fear Reduction  Outcome: Ongoing (interventions implemented as appropriate)   09/14/19 0442   Breathing Pattern Ineffective (Adult)   Anxiety/Fear  Reduction making progress toward outcome       Problem: Skin Injury Risk (Adult)  Goal: Skin Health and Integrity  Outcome: Ongoing (interventions implemented as appropriate)   09/14/19 8341   Skin Injury Risk (Adult)   Skin Health and Integrity making progress toward outcome

## 2019-09-14 NOTE — PROGRESS NOTES
Family Medicine Progress Note    Patient:  Carlotta Ortiz  YOB: 1940    MRN: [id number]     Acct: [id number]     Admit date: 9/10/2019    Patient Seen, Chart, Consults notes, Labs, Radiology studies reviewed.    Subjective: Day 1 of stay with exacerbation of asthma and most recent (in last 24 hours) has had improvement in her breathing.  Feels much more comfortable.  Has no new complaint.    Past, Family, Social History unchanged from admission.    Diet: Diet Regular; Consistent Carbohydrate    Medications:  Scheduled Meds:  bumetanide 2 mg Oral BID   enoxaparin 40 mg Subcutaneous Q24H   insulin lispro 2-7 Units Subcutaneous 4x Daily With Meals & Nightly   ipratropium-albuterol 3 mL Nebulization Q6H - RT   lansoprazole 30 mg Oral Q AM   levoFLOXacin 500 mg Intravenous Q24H   levothyroxine 125 mcg Oral Q AM   methylPREDNISolone sodium succinate 40 mg Intravenous Q12H   montelukast 10 mg Oral Nightly   nystatin 1 application Topical TID   polyethylene glycol 17 g Oral Daily   potassium chloride 10 mEq Oral BID With Meals   primidone 12.5 mg Oral Nightly   senna 2 tablet Oral Daily   sodium chloride 10 mL Intravenous Q12H   spironolactone 50 mg Oral Daily     Continuous Infusions:   PRN Meds:•  bisacodyl  •  calcium carbonate EX  •  dextrose  •  dextrose  •  glucagon (human recombinant)  •  sodium chloride    Objective:    Lab Results (last 24 hours)     Procedure Component Value Units Date/Time    POC Glucose Once [[id number]]  (Normal) Collected:  09/14/19 0740    Specimen:  Blood Updated:  09/14/19 0811     Glucose 114 mg/dL      Comment: : [id number] Ruthie MooreMeter ID: [id number]       POC Glucose Once [[id number]]  (Abnormal) Collected:  09/13/19 2154    Specimen:  Blood Updated:  09/13/19 2214     Glucose 225 mg/dL      Comment: : [id number] Tevin MaloneMeter ID: [id number]       POC Glucose Once [[id number]]  (Abnormal) Collected:  09/13/19 1730    Specimen:  Blood Updated:   "09/13/19 1742     Glucose 232 mg/dL      Comment: : [id number] Shad MaciasniferMeter ID: [id number]       POC Glucose Once [[id number]]  (Abnormal) Collected:  09/13/19 1212    Specimen:  Blood Updated:  09/13/19 1243     Glucose 166 mg/dL      Comment: : [id number] Georgi JarvisMeter ID: [id number]       Blood Culture - Blood, Arm, Right [[id number]] Collected:  09/10/19 1154    Specimen:  Blood from Arm, Right Updated:  09/13/19 1215     Blood Culture No growth at 3 days           Imaging Results (last 72 hours)     ** No results found for the last 72 hours. **           Physical Exam:    Vitals: /78 (BP Location: Right arm, Patient Position: Lying)   Pulse 95   Temp 98.7 °F (37.1 °C) (Oral)   Resp 18   Ht 154.9 cm (61\")   Wt 112 kg (246 lb 3.2 oz)   SpO2 94%   BMI 46.52 kg/m²   24 hour intake/output:    Intake/Output Summary (Last 24 hours) at 9/14/2019 0928  Last data filed at 9/14/2019 0355  Gross per 24 hour   Intake --   Output 1750 ml   Net -1750 ml     Last 3 weights:  Wt Readings from Last 3 Encounters:   09/13/19 112 kg (246 lb 3.2 oz)   01/29/19 103 kg (227 lb 1.6 oz)   08/10/18 80.3 kg (177 lb)       General Appearance alert, appears stated age, cooperative and morbidly obese  Head normocephalic, without obvious abnormality and atraumatic  Eyes lids and lashes normal, conjunctivae and sclerae normal and no icterus  Neck no adenopathy, supple and trachea midline  Lungs respirations regular, respirations even and respirations unlabored, Diminished breath sounds with some scattered faint wheezes  Heart regular rhythm & normal rate and normal S1, S2, systolic murmur  2/6 at 2nd left intercostal space  Abdomen normal bowel sounds and soft non-tender  Extremities no cyanosis and no redness, edema 1+ lower bilateral  Skin turgor normal  Neurologic Mental Status orientated to person, place, time and situation        Assessment:      Moderate persistent asthma with acute exacerbation    Asthma " attack    Cellulitis    Type 2 diabetes mellitus (CMS/HCC)    Essential hypertension    Hypothyroidism (acquired)          Plan:  Continue with current treatment plan.  Working toward discharge to [facility] skilled nursing facility.  Transition to oral medications soon.  Possible overnight pulse oximetry as per pulmonary.      Electronically signed by Jasper Peralta MD on 9/14/2019 at 9:28 AM

## 2019-09-14 NOTE — PLAN OF CARE
Problem: Patient Care Overview  Goal: Plan of Care Review  Outcome: Ongoing (interventions implemented as appropriate)   09/14/19 0442 09/14/19 3162   Coping/Psychosocial   Plan of Care Reviewed With patient --    OTHER   Outcome Summary --  Patient resting comfortably in bed this shift. IV abx given as ordered. Pulm changed iv steroids to PO. Hopefully home soon to a SNF. VSS, will continue to monitor.    Plan of Care Review   Progress improving --        Problem: Fall Risk (Adult)  Goal: Identify Related Risk Factors and Signs and Symptoms  Outcome: Ongoing (interventions implemented as appropriate)   09/14/19 0442   Fall Risk (Adult)   Related Risk Factors (Fall Risk) gait/mobility problems;age-related changes;bladder function altered;fatigue/slow reaction;environment unfamiliar   Signs and Symptoms (Fall Risk) presence of risk factors     Goal: Absence of Fall  Outcome: Ongoing (interventions implemented as appropriate)   09/14/19 0442   Fall Risk (Adult)   Absence of Fall making progress toward outcome       Problem: Breathing Pattern Ineffective (Adult)  Goal: Effective Oxygenation/Ventilation  Outcome: Ongoing (interventions implemented as appropriate)   09/14/19 0442   Breathing Pattern Ineffective (Adult)   Effective Oxygenation/Ventilation making progress toward outcome     Goal: Anxiety/Fear Reduction  Outcome: Ongoing (interventions implemented as appropriate)   09/14/19 0442   Breathing Pattern Ineffective (Adult)   Anxiety/Fear Reduction making progress toward outcome       Problem: Skin Injury Risk (Adult)  Goal: Skin Health and Integrity  Outcome: Ongoing (interventions implemented as appropriate)   09/14/19 0442   Skin Injury Risk (Adult)   Skin Health and Integrity making progress toward outcome

## 2019-09-15 LAB
BACTERIA SPEC AEROBE CULT: NORMAL
GLUCOSE BLDC GLUCOMTR-MCNC: 153 MG/DL (ref 70–130)
GLUCOSE BLDC GLUCOMTR-MCNC: 167 MG/DL (ref 70–130)
GLUCOSE BLDC GLUCOMTR-MCNC: 201 MG/DL (ref 70–130)
GLUCOSE BLDC GLUCOMTR-MCNC: 226 MG/DL (ref 70–130)

## 2019-09-15 PROCEDURE — 82962 GLUCOSE BLOOD TEST: CPT

## 2019-09-15 PROCEDURE — 94799 UNLISTED PULMONARY SVC/PX: CPT

## 2019-09-15 PROCEDURE — 63710000001 PREDNISONE PER 1 MG: Performed by: INTERNAL MEDICINE

## 2019-09-15 PROCEDURE — 63710000001 INSULIN LISPRO (HUMAN) PER 5 UNITS: Performed by: NURSE PRACTITIONER

## 2019-09-15 PROCEDURE — 94760 N-INVAS EAR/PLS OXIMETRY 1: CPT

## 2019-09-15 PROCEDURE — 25010000002 ENOXAPARIN PER 10 MG: Performed by: NURSE PRACTITIONER

## 2019-09-15 PROCEDURE — 25010000002 LEVOFLOXACIN PER 250 MG: Performed by: FAMILY MEDICINE

## 2019-09-15 RX ORDER — PREDNISONE 20 MG/1
20 TABLET ORAL DAILY
Status: DISCONTINUED | OUTPATIENT
Start: 2019-09-16 | End: 2019-09-16 | Stop reason: HOSPADM

## 2019-09-15 RX ADMIN — SENNOSIDES 17.2 MG: 8.6 TABLET, FILM COATED ORAL at 08:11

## 2019-09-15 RX ADMIN — PRIMIDONE 12.5 MG: 50 TABLET ORAL at 21:39

## 2019-09-15 RX ADMIN — SPIRONOLACTONE 50 MG: 50 TABLET, FILM COATED ORAL at 08:11

## 2019-09-15 RX ADMIN — ENOXAPARIN SODIUM 40 MG: 40 INJECTION SUBCUTANEOUS at 21:39

## 2019-09-15 RX ADMIN — SODIUM CHLORIDE, PRESERVATIVE FREE 10 ML: 5 INJECTION INTRAVENOUS at 21:41

## 2019-09-15 RX ADMIN — SODIUM CHLORIDE, PRESERVATIVE FREE 10 ML: 5 INJECTION INTRAVENOUS at 08:13

## 2019-09-15 RX ADMIN — PREDNISONE 20 MG: 20 TABLET ORAL at 17:16

## 2019-09-15 RX ADMIN — NYSTATIN 1 APPLICATION: 100000 POWDER TOPICAL at 21:39

## 2019-09-15 RX ADMIN — INSULIN LISPRO 3 UNITS: 100 INJECTION, SOLUTION INTRAVENOUS; SUBCUTANEOUS at 12:23

## 2019-09-15 RX ADMIN — IPRATROPIUM BROMIDE AND ALBUTEROL SULFATE 3 ML: 2.5; .5 SOLUTION RESPIRATORY (INHALATION) at 12:19

## 2019-09-15 RX ADMIN — INSULIN LISPRO 2 UNITS: 100 INJECTION, SOLUTION INTRAVENOUS; SUBCUTANEOUS at 17:16

## 2019-09-15 RX ADMIN — POLYETHYLENE GLYCOL 3350 17 G: 17 POWDER, FOR SOLUTION ORAL at 08:11

## 2019-09-15 RX ADMIN — IPRATROPIUM BROMIDE AND ALBUTEROL SULFATE 3 ML: 2.5; .5 SOLUTION RESPIRATORY (INHALATION) at 00:11

## 2019-09-15 RX ADMIN — INSULIN LISPRO 2 UNITS: 100 INJECTION, SOLUTION INTRAVENOUS; SUBCUTANEOUS at 08:05

## 2019-09-15 RX ADMIN — NYSTATIN 1 APPLICATION: 100000 POWDER TOPICAL at 08:11

## 2019-09-15 RX ADMIN — IPRATROPIUM BROMIDE AND ALBUTEROL SULFATE 3 ML: 2.5; .5 SOLUTION RESPIRATORY (INHALATION) at 19:23

## 2019-09-15 RX ADMIN — POTASSIUM CHLORIDE 10 MEQ: 10 CAPSULE, COATED, EXTENDED RELEASE ORAL at 08:05

## 2019-09-15 RX ADMIN — LEVOFLOXACIN 500 MG: 5 INJECTION, SOLUTION INTRAVENOUS at 11:24

## 2019-09-15 RX ADMIN — INSULIN LISPRO 3 UNITS: 100 INJECTION, SOLUTION INTRAVENOUS; SUBCUTANEOUS at 21:39

## 2019-09-15 RX ADMIN — NYSTATIN 1 APPLICATION: 100000 POWDER TOPICAL at 17:16

## 2019-09-15 RX ADMIN — LANSOPRAZOLE 30 MG: KIT at 08:05

## 2019-09-15 RX ADMIN — LEVOTHYROXINE SODIUM 125 MCG: 125 TABLET ORAL at 06:47

## 2019-09-15 RX ADMIN — BUMETANIDE 2 MG: 2 TABLET ORAL at 06:47

## 2019-09-15 RX ADMIN — IPRATROPIUM BROMIDE AND ALBUTEROL SULFATE 3 ML: 2.5; .5 SOLUTION RESPIRATORY (INHALATION) at 06:15

## 2019-09-15 RX ADMIN — MONTELUKAST SODIUM 10 MG: 10 TABLET, FILM COATED ORAL at 21:39

## 2019-09-15 RX ADMIN — POTASSIUM CHLORIDE 10 MEQ: 10 CAPSULE, COATED, EXTENDED RELEASE ORAL at 17:16

## 2019-09-15 RX ADMIN — BUMETANIDE 2 MG: 2 TABLET ORAL at 17:16

## 2019-09-15 RX ADMIN — PREDNISONE 20 MG: 20 TABLET ORAL at 08:05

## 2019-09-15 NOTE — PLAN OF CARE
Problem: Patient Care Overview  Goal: Plan of Care Review  Outcome: Ongoing (interventions implemented as appropriate)   09/15/19 0543 09/15/19 1442   Coping/Psychosocial   Plan of Care Reviewed With patient --    OTHER   Outcome Summary --  Patient on 2.5 LPM, IV abx as ordered. No c/o pain, plan to discharge to [facility] tomorrow. VSS, will continue to monitor.    Plan of Care Review   Progress no change --        Problem: Fall Risk (Adult)  Goal: Identify Related Risk Factors and Signs and Symptoms  Outcome: Ongoing (interventions implemented as appropriate)   09/14/19 0442   Fall Risk (Adult)   Related Risk Factors (Fall Risk) gait/mobility problems;age-related changes;bladder function altered;fatigue/slow reaction;environment unfamiliar   Signs and Symptoms (Fall Risk) presence of risk factors     Goal: Absence of Fall  Outcome: Ongoing (interventions implemented as appropriate)   09/15/19 0543   Fall Risk (Adult)   Absence of Fall making progress toward outcome       Problem: Breathing Pattern Ineffective (Adult)  Goal: Effective Oxygenation/Ventilation  Outcome: Ongoing (interventions implemented as appropriate)   09/15/19 0543   Breathing Pattern Ineffective (Adult)   Effective Oxygenation/Ventilation making progress toward outcome     Goal: Anxiety/Fear Reduction  Outcome: Ongoing (interventions implemented as appropriate)      Problem: Skin Injury Risk (Adult)  Goal: Skin Health and Integrity  Outcome: Ongoing (interventions implemented as appropriate)

## 2019-09-15 NOTE — PROGRESS NOTES
Family Medicine Progress Note    Patient:  Carlotta Ortiz  YOB: 1940    MRN: [id number]     Acct: [id number]     Admit date: 9/10/2019    Patient Seen, Chart, Consults notes, Labs, Radiology studies reviewed.    Subjective: Day 2 of stay with exacerbation of asthma with acute on chronic respiratory failure and most recent (in last 24 hours) has had a decent night.  Breathing is improved.    Past, Family, Social History unchanged from admission.    Diet: Diet Regular; Consistent Carbohydrate    Medications:  Scheduled Meds:  bumetanide 2 mg Oral BID   enoxaparin 40 mg Subcutaneous Q24H   insulin lispro 2-7 Units Subcutaneous 4x Daily With Meals & Nightly   ipratropium-albuterol 3 mL Nebulization Q6H - RT   lansoprazole 30 mg Oral Q AM   levoFLOXacin 500 mg Intravenous Q24H   levothyroxine 125 mcg Oral Q AM   montelukast 10 mg Oral Nightly   nystatin 1 application Topical TID   polyethylene glycol 17 g Oral Daily   potassium chloride 10 mEq Oral BID With Meals   predniSONE 20 mg Oral BID With Meals   primidone 12.5 mg Oral Nightly   senna 2 tablet Oral Daily   sodium chloride 10 mL Intravenous Q12H   spironolactone 50 mg Oral Daily     Continuous Infusions:   PRN Meds:•  bisacodyl  •  calcium carbonate EX  •  dextrose  •  dextrose  •  glucagon (human recombinant)  •  sodium chloride    Objective:    Lab Results (last 24 hours)     Procedure Component Value Units Date/Time    POC Glucose Once [[id number]]  (Abnormal) Collected:  09/15/19 0743    Specimen:  Blood Updated:  09/15/19 0813     Glucose 167 mg/dL      Comment: : [id number] Ruthie MooreMeter ID: [id number]       POC Glucose Once [[id number]]  (Abnormal) Collected:  09/14/19 2100    Specimen:  Blood Updated:  09/14/19 2128     Glucose 188 mg/dL      Comment: : [id number] Liss Sims (Dr[place])Meter ID: [id number]       POC Glucose Once [[id number]]  (Abnormal) Collected:  09/14/19 1648    Specimen:  Blood Updated:  09/14/19 1719      "Glucose 215 mg/dL      Comment: : [id number] Ruthie MooreMeter ID: [id number]       Blood Culture - Blood, Arm, Right [[id number]] Collected:  09/10/19 1154    Specimen:  Blood from Arm, Right Updated:  09/14/19 1215     Blood Culture No growth at 4 days    POC Glucose Once [[id number]]  (Abnormal) Collected:  09/14/19 1104    Specimen:  Blood Updated:  09/14/19 1135     Glucose 175 mg/dL      Comment: : [id number] Dilley Apex GuardMeter ID: [id number]              Imaging Results (last 72 hours)     ** No results found for the last 72 hours. **           Physical Exam:    Vitals: /62 (BP Location: Right arm, Patient Position: Lying)   Pulse 86   Temp 97.9 °F (36.6 °C) (Oral)   Resp 18   Ht 154.9 cm (61\")   Wt 111 kg (245 lb 2.4 oz)   SpO2 93%   BMI 46.32 kg/m²   24 hour intake/output:    Intake/Output Summary (Last 24 hours) at 9/15/2019 0834  Last data filed at 9/14/2019 2100  Gross per 24 hour   Intake --   Output 1200 ml   Net -1200 ml     Last 3 weights:  Wt Readings from Last 3 Encounters:   09/14/19 111 kg (245 lb 2.4 oz)   01/29/19 103 kg (227 lb 1.6 oz)   08/10/18 80.3 kg (177 lb)       General Appearance alert, appears stated age, cooperative and morbidly obese  Head normocephalic, without obvious abnormality and atraumatic  Eyes lids and lashes normal, conjunctivae and sclerae normal and no icterus  Neck supple and trachea midline  Lungs clear to auscultation, respirations regular, respirations even and respirations unlabored, Diminished breath sounds  Heart regular rhythm & normal rate, normal S1, S2 and no murmur, no gallop, no rub  Abdomen normal bowel sounds, soft non-tender, no guarding and no rebound tenderness  Extremities no cyanosis and no redness, edema trace lower bilateral  Skin turgor normal  Neurologic Mental Status orientated to person, place, time and situation        Assessment:      Moderate persistent asthma with acute exacerbation    Asthma attack    Cellulitis    " Type 2 diabetes mellitus (CMS/HCC)    Essential hypertension    Hypothyroidism (acquired)          Plan:  Improving and getting close to her baseline per patient.  I believe plan is for [facility] care.  Possibly tomorrow if bed available and all approvals are completed.      Electronically signed by Jasper Peralta MD on 9/15/2019 at 8:34 AM

## 2019-09-15 NOTE — PLAN OF CARE
Problem: Patient Care Overview  Goal: Plan of Care Review  Outcome: Ongoing (interventions implemented as appropriate)   09/15/19 0543   Coping/Psychosocial   Plan of Care Reviewed With patient   OTHER   Outcome Summary no c/o pain, 2.5L O2 continues, lungs diminished with crackles, IV Levaquin continues daily, up wth lift team, turning q2h, purewick in place r/t immobility, moderate generalized edema and multiple bruises, plan [facility] @ D/C   Plan of Care Review   Progress no change       Problem: Fall Risk (Adult)  Goal: Absence of Fall  Outcome: Ongoing (interventions implemented as appropriate)      Problem: Breathing Pattern Ineffective (Adult)  Goal: Effective Oxygenation/Ventilation  Outcome: Ongoing (interventions implemented as appropriate)    Goal: Anxiety/Fear Reduction  Outcome: Ongoing (interventions implemented as appropriate)      Problem: Skin Injury Risk (Adult)  Goal: Skin Health and Integrity  Outcome: Ongoing (interventions implemented as appropriate)

## 2019-09-15 NOTE — PROGRESS NOTES
"  PULMONARY PROGRESS NOTE  Patient Name: Carlotta Ortiz  Age/Sex: 79 y.o. female  : 1940  MRN: [id number]    Date of Admission: 9/10/2019  Date of Encounter Visit: 09/15/19   LOS: 2 days   Patient Care Team:  Ken Brady MD as PCP - General  Ken Brady MD as PCP - Family Medicine    Chief Complaint: Improving breathing      Hospital course: Patient is being treated for acute asthma exacerbation, she is doing better on the IV Solu-Medrol which was later switched to prednisone on 2019 and she continues to improve, she has a chest x-ray that showed no obvious disease, she is afebrile  Patient has obstructive sleep apnea, she has a CPAP that she does not use and she uses the nocturnal oxygen instead    Interval History: Continues to improve still oxygen dependent however during the daytime    REVIEW OF SYSTEMS:   CONSTITUTIONAL: no fever or chills  CARDIOVASCULAR: No chest pain, chest pressure or chest discomfort. No palpitations or edema.   RESPIRATORY: Improving shortness of breath, minimal productive cough.   GASTROINTESTINAL: No anorexia, nausea, vomiting or diarrhea. No abdominal pain or blood.   HEMATOLOGIC: No bleeding or bruising.     Ventilator/Non-Invasive Ventilation Settings (From admission, onward)    Nasal cannula oxygen 3 L/min            Vital Signs  Temp:  [97.8 °F (36.6 °C)-98 °F (36.7 °C)] 97.9 °F (36.6 °C)  Heart Rate:  [] 86  Resp:  [16-20] 18  BP: (128-151)/(62-73) 137/62  SpO2:  [91 %-96 %] 93 %  on  Flow (L/min):  [2.5] 2.5 Device (Oxygen Therapy): nasal cannula    Intake/Output Summary (Last 24 hours) at 9/15/2019 1000  Last data filed at 9/15/2019 0924  Gross per 24 hour   Intake 360 ml   Output 1200 ml   Net -840 ml     Flowsheet Rows      First Filed Value   Admission Height  154.9 cm (61\") Documented at 09/10/2019 1125   Admission Weight  108 kg (238 lb) Documented at 09/10/2019 1125        Body mass index is 46.32 kg/m².      19 " 09/14/19 2012   Weight: 111 kg (244 lb 14.9 oz) 112 kg (246 lb 3.2 oz) 111 kg (245 lb 2.4 oz)       Physical Exam:  GEN:  No acute distress, alert, cooperative, well developed, on nasal cannula oxygen  EYES:   Sclera clear. No icterus. PERRL. Normal EOM  ENT:   External ears/nose normal, no oral lesions, mucous membranes moist, she had small ecchymosis on the bottom of the tongue which is unchanged no thrush  NECK:  Supple, midline trachea, no JVD  LUNGS: Normal chest on inspection, CTAB, no wheezes. No rhonchi. No crackles. Respirations regular, even and unlabored.   CV:  Regular rhythm and rate. Normal S1/S2. No murmurs, gallops, or rubs noted.  ABD:  Soft, non-tender and non-distended. Normal bowel sounds. No guarding  EXT:  Moves all extremities well. No cyanosis. No redness.  Minimal nonpitting  edema,.   Skin: dry, intact, no bleeding    Results Review:      Results from last 7 days   Lab Units 09/12/19  0354 09/11/19  0619 09/10/19  1155   SODIUM mmol/L 137 138 139   POTASSIUM mmol/L 4.8 5.0 4.1   CHLORIDE mmol/L 96* 96* 99   CO2 mmol/L 29.0 31.0* 29.0   BUN mg/dL 35* 35* 35*   CREATININE mg/dL 1.06* 1.06* 0.94   CALCIUM mg/dL 9.6 9.7 9.2   AST (SGOT) U/L  --  19 27   ALT (SGPT) U/L  --  38* 42*   ANION GAP mmol/L 12.0 11.0 11.0   ALBUMIN g/dL  --  3.70 3.40*     Results from last 7 days   Lab Units 09/10/19  1155   TROPONIN T ng/mL 0.013         Results from last 7 days   Lab Units 09/11/19  0619 09/10/19  1155   PROBNP pg/mL 244.0 128.1     Results from last 7 days   Lab Units 09/12/19  0354 09/11/19  0619 09/10/19  1233   WBC 10*3/mm3 14.37* 13.01* 12.94*   HEMOGLOBIN g/dL 15.9 15.8 16.1*   HEMATOCRIT % 47.0* 47.4* 47.8*   PLATELETS 10*3/mm3 163 154 140   MCV fL 97.9* 99.0* 99.4*   NEUTROPHIL % % 92.8*  --  84.9*   LYMPHOCYTE % % 2.4*  --  9.0*   MONOCYTES % % 2.3*  --  4.1*   EOSINOPHIL % % 0.0*  --  0.1*   BASOPHIL % % 0.2  --  0.4   IMM GRAN % % 2.3*  --  1.5*     Results from last 7 days   Lab Units  09/10/19  1155   INR  0.86*   APTT seconds 22.4*               Invalid input(s): LDLCALC  Results from last 7 days   Lab Units 09/11/19  0408 09/10/19  1155   PH, ARTERIAL pH units 7.457* 7.495*   PCO2, ARTERIAL mm Hg 43.6 36.9   PO2 ART mm Hg 84.2 72.5*   HCO3 ART mmol/L 30.8* 28.4*         Glucose   Date/Time Value Ref Range Status   09/15/2019 0743 167 (H) 70 - 130 mg/dL Final     Comment:     : [id number] Ruthie MooreMeter ID: [id number]   09/14/2019 2100 188 (H) 70 - 130 mg/dL Final     Comment:     : [id number] Liss Mora ([place]) AnnMeter ID: [id number]   09/14/2019 1648 215 (H) 70 - 130 mg/dL Final     Comment:     : [id number] Ruthie DownsleyMeter ID: [id number]   09/14/2019 1104 175 (H) 70 - 130 mg/dL Final     Comment:     : [id number] Ruthie DownsleyMeter ID: [id number]   09/14/2019 0740 114 70 - 130 mg/dL Final     Comment:     : [id number] Ruthie MooreMeter ID: [id number]   09/13/2019 2154 225 (H) 70 - 130 mg/dL Final     Comment:     : [id number] Tevin FishmanherMeter ID: [id number]   09/13/2019 1730 232 (H) 70 - 130 mg/dL Final     Comment:     : [id number] Shad ForteferMeter ID: [id number]   09/13/2019 1212 166 (H) 70 - 130 mg/dL Final     Comment:     : [id number] Georgi EscuderociMeter ID: [id number]     Results from last 7 days   Lab Units 09/12/19  0354 09/11/19  0619 09/10/19  1809 09/10/19  1233   LACTATE mmol/L 1.6 2.2* 5.4* 3.1*     Results from last 7 days   Lab Units 09/10/19  1154 09/10/19  1144   BLOODCX  No growth at 4 days Staphylococcus, coagulase negative*   BCIDPCR   --  Staphylococcus spp, not aureus. Identification by BCID PCR.*                   Imaging:   Imaging Results (all)     Procedure Component Value Units Date/Time    XR Chest PA & Lateral [[id number]] Collected:  09/10/19 2142     Updated:  09/10/19 2147    Narrative:       EXAMINATION:   XR CHEST PA AND LATERAL-  9/10/2019 9:42 PM CDT     HISTORY: Upright frontal and lateral radiographs  of the chest 9/10/2019  9:07 PM CDT     HISTORY: Asthma     COMPARISON: 09/10/2019.     FINDINGS:   The lungs are clear. Pleural-based lesions again noted in the right  chest. Cardiac silhouettes enlarged this is unchanged. The osseous  structures and surrounding soft tissues demonstrate no acute  abnormality.       Impression:       1. COPD with hyperinflation flattening diaphragms. This compared to CT  scan and prior chest exam of the same date  2. Pleural-based lesion in the right chest is again noted.        This report was finalized on 09/10/2019 21:44 by Dr. Otilio Rutledge MD.       no new films to review     medication Review:     bumetanide 2 mg Oral BID   enoxaparin 40 mg Subcutaneous Q24H   insulin lispro 2-7 Units Subcutaneous 4x Daily With Meals & Nightly   ipratropium-albuterol 3 mL Nebulization Q6H - RT   lansoprazole 30 mg Oral Q AM   levoFLOXacin 500 mg Intravenous Q24H   levothyroxine 125 mcg Oral Q AM   montelukast 10 mg Oral Nightly   nystatin 1 application Topical TID   polyethylene glycol 17 g Oral Daily   potassium chloride 10 mEq Oral BID With Meals   predniSONE 20 mg Oral BID With Meals   primidone 12.5 mg Oral Nightly   senna 2 tablet Oral Daily   sodium chloride 10 mL Intravenous Q12H   spironolactone 50 mg Oral Daily            ASSESSMENT:   1. Asthma with acute exacerbation  2. Obstructive sleep apnea with intolerance to CPAP at home  3. Sleep-related hypoxemia   4. Coagulase-negative staph on 1 out of 2 blood cultures, likely skin pathogen contaminant  5. Stable subpleural mass/fluid density  6. Tongue ecchymosis    PLAN:  Patient is doing a lot better  Off the IV steroids and on p.o. prednisone  She has no wheezes on today's exam  Taper the prednisone and discontinue slowly over the next 3 days  Patient is cleared to transfer back to the nursing home from my standpoint  We will follow-up PRN  Education provided about the superiority of the CPAP over the nocturnal oxygen and recommended  to use the CPAP instead    Prednisone taper orders already entered in Epic, please continue as such on discharge    Discussed  with the patient in detail    Disposition: Nursing home in a.m. with follow-up DESIRE Olea MD  09/15/19  10:00 AM           Dictated utilizing Dragon dictation

## 2019-09-16 VITALS
HEIGHT: 61 IN | OXYGEN SATURATION: 97 % | WEIGHT: 245.15 LBS | HEART RATE: 89 BPM | TEMPERATURE: 98.2 F | RESPIRATION RATE: 18 BRPM | SYSTOLIC BLOOD PRESSURE: 148 MMHG | DIASTOLIC BLOOD PRESSURE: 73 MMHG | BODY MASS INDEX: 46.29 KG/M2

## 2019-09-16 LAB
ANION GAP SERPL CALCULATED.3IONS-SCNC: 9 MMOL/L (ref 5–15)
BUN BLD-MCNC: 30 MG/DL (ref 8–23)
BUN/CREAT SERPL: 30 (ref 7–25)
CALCIUM SPEC-SCNC: 9.1 MG/DL (ref 8.6–10.5)
CHLORIDE SERPL-SCNC: 96 MMOL/L (ref 98–107)
CO2 SERPL-SCNC: 33 MMOL/L (ref 22–29)
CREAT BLD-MCNC: 1 MG/DL (ref 0.57–1)
DEPRECATED RDW RBC AUTO: 52.3 FL (ref 37–54)
ERYTHROCYTE [DISTWIDTH] IN BLOOD BY AUTOMATED COUNT: 14.4 % (ref 12.3–15.4)
GFR SERPL CREATININE-BSD FRML MDRD: 53 ML/MIN/1.73
GLUCOSE BLD-MCNC: 184 MG/DL (ref 65–99)
GLUCOSE BLDC GLUCOMTR-MCNC: 152 MG/DL (ref 70–130)
GLUCOSE BLDC GLUCOMTR-MCNC: 162 MG/DL (ref 70–130)
HCT VFR BLD AUTO: 45.4 % (ref 34–46.6)
HGB BLD-MCNC: 15.3 G/DL (ref 12–15.9)
LYMPHOCYTES # BLD MANUAL: 0.42 10*3/MM3 (ref 0.7–3.1)
LYMPHOCYTES NFR BLD MANUAL: 4 % (ref 19.6–45.3)
LYMPHOCYTES NFR BLD MANUAL: 4 % (ref 5–12)
MCH RBC QN AUTO: 33.5 PG (ref 26.6–33)
MCHC RBC AUTO-ENTMCNC: 33.7 G/DL (ref 31.5–35.7)
MCV RBC AUTO: 99.3 FL (ref 79–97)
MONOCYTES # BLD AUTO: 0.42 10*3/MM3 (ref 0.1–0.9)
MYELOCYTES NFR BLD MANUAL: 1 % (ref 0–0)
NEUTROPHILS # BLD AUTO: 9.52 10*3/MM3 (ref 1.7–7)
NEUTROPHILS NFR BLD MANUAL: 90 % (ref 42.7–76)
NEUTS BAND NFR BLD MANUAL: 1 % (ref 0–5)
NRBC SPEC MANUAL: 1 /100 WBC (ref 0–0.2)
PLAT MORPH BLD: NORMAL
PLATELET # BLD AUTO: 143 10*3/MM3 (ref 140–450)
PMV BLD AUTO: 10.1 FL (ref 6–12)
POTASSIUM BLD-SCNC: 3.9 MMOL/L (ref 3.5–5.2)
RBC # BLD AUTO: 4.57 10*6/MM3 (ref 3.77–5.28)
RBC MORPH BLD: NORMAL
SODIUM BLD-SCNC: 138 MMOL/L (ref 136–145)
WBC MORPH BLD: NORMAL
WBC NRBC COR # BLD: 10.46 10*3/MM3 (ref 3.4–10.8)

## 2019-09-16 PROCEDURE — 80048 BASIC METABOLIC PNL TOTAL CA: CPT | Performed by: FAMILY MEDICINE

## 2019-09-16 PROCEDURE — 85025 COMPLETE CBC W/AUTO DIFF WBC: CPT | Performed by: FAMILY MEDICINE

## 2019-09-16 PROCEDURE — 85007 BL SMEAR W/DIFF WBC COUNT: CPT | Performed by: FAMILY MEDICINE

## 2019-09-16 PROCEDURE — 94799 UNLISTED PULMONARY SVC/PX: CPT

## 2019-09-16 PROCEDURE — 82962 GLUCOSE BLOOD TEST: CPT

## 2019-09-16 PROCEDURE — 63710000001 PREDNISONE PER 1 MG: Performed by: INTERNAL MEDICINE

## 2019-09-16 PROCEDURE — 25010000002 LEVOFLOXACIN PER 250 MG: Performed by: FAMILY MEDICINE

## 2019-09-16 PROCEDURE — 63710000001 INSULIN LISPRO (HUMAN) PER 5 UNITS: Performed by: NURSE PRACTITIONER

## 2019-09-16 RX ORDER — PREDNISONE 20 MG/1
20 TABLET ORAL DAILY
Qty: 3 TABLET | Refills: 0 | Status: SHIPPED | OUTPATIENT
Start: 2019-09-17 | End: 2019-09-20

## 2019-09-16 RX ORDER — CALCIUM CARBONATE 200(500)MG
1 TABLET,CHEWABLE ORAL 3 TIMES DAILY PRN
Qty: 90 TABLET | Refills: 3 | Status: SHIPPED | OUTPATIENT
Start: 2019-09-16

## 2019-09-16 RX ADMIN — IPRATROPIUM BROMIDE AND ALBUTEROL SULFATE 3 ML: 2.5; .5 SOLUTION RESPIRATORY (INHALATION) at 06:00

## 2019-09-16 RX ADMIN — SENNOSIDES 17.2 MG: 8.6 TABLET, FILM COATED ORAL at 08:25

## 2019-09-16 RX ADMIN — INSULIN LISPRO 2 UNITS: 100 INJECTION, SOLUTION INTRAVENOUS; SUBCUTANEOUS at 08:26

## 2019-09-16 RX ADMIN — SPIRONOLACTONE 50 MG: 50 TABLET, FILM COATED ORAL at 08:25

## 2019-09-16 RX ADMIN — BUMETANIDE 2 MG: 2 TABLET ORAL at 06:08

## 2019-09-16 RX ADMIN — LEVOTHYROXINE SODIUM 125 MCG: 125 TABLET ORAL at 06:08

## 2019-09-16 RX ADMIN — POTASSIUM CHLORIDE 10 MEQ: 10 CAPSULE, COATED, EXTENDED RELEASE ORAL at 08:26

## 2019-09-16 RX ADMIN — POLYETHYLENE GLYCOL 3350 17 G: 17 POWDER, FOR SOLUTION ORAL at 08:24

## 2019-09-16 RX ADMIN — PREDNISONE 20 MG: 20 TABLET ORAL at 08:25

## 2019-09-16 RX ADMIN — LANSOPRAZOLE 30 MG: KIT at 06:08

## 2019-09-16 RX ADMIN — NYSTATIN 1 APPLICATION: 100000 POWDER TOPICAL at 08:27

## 2019-09-16 RX ADMIN — IPRATROPIUM BROMIDE AND ALBUTEROL SULFATE 3 ML: 2.5; .5 SOLUTION RESPIRATORY (INHALATION) at 11:59

## 2019-09-16 RX ADMIN — INSULIN LISPRO 2 UNITS: 100 INJECTION, SOLUTION INTRAVENOUS; SUBCUTANEOUS at 12:12

## 2019-09-16 RX ADMIN — SODIUM CHLORIDE, PRESERVATIVE FREE 10 ML: 5 INJECTION INTRAVENOUS at 08:26

## 2019-09-16 RX ADMIN — IPRATROPIUM BROMIDE AND ALBUTEROL SULFATE 3 ML: 2.5; .5 SOLUTION RESPIRATORY (INHALATION) at 00:20

## 2019-09-16 RX ADMIN — LEVOFLOXACIN 500 MG: 5 INJECTION, SOLUTION INTRAVENOUS at 12:46

## 2019-09-16 NOTE — PLAN OF CARE
Problem: Patient Care Overview  Goal: Plan of Care Review  Outcome: Ongoing (interventions implemented as appropriate)   09/16/19 0520   Coping/Psychosocial   Plan of Care Reviewed With patient   OTHER   Outcome Summary VSS. No c/o SOA this shift. O2 sats stable on 3L/NC. Safety maintained. Possible d/c today to [facility]. Continue to monitor.   Plan of Care Review   Progress no change       Problem: Fall Risk (Adult)  Goal: Identify Related Risk Factors and Signs and Symptoms  Outcome: Outcome(s) achieved Date Met: 09/16/19    Goal: Absence of Fall  Outcome: Ongoing (interventions implemented as appropriate)      Problem: Breathing Pattern Ineffective (Adult)  Goal: Effective Oxygenation/Ventilation  Outcome: Ongoing (interventions implemented as appropriate)    Goal: Anxiety/Fear Reduction  Outcome: Ongoing (interventions implemented as appropriate)      Problem: Skin Injury Risk (Adult)  Goal: Skin Health and Integrity  Outcome: Ongoing (interventions implemented as appropriate)

## 2019-09-16 NOTE — DISCHARGE SUMMARY
Hospital Discharge Summary    Carlotta Ortiz  :  1940  MRN:  [id number]    Admit date:  9/10/2019  Discharge date:   2019      Admitting Physician:    Ken Brady MD    Discharge Diagnoses:      Moderate persistent asthma with acute exacerbation    Asthma attack    Cellulitis    Type 2 diabetes mellitus (CMS/HCC)    Essential hypertension    Hypothyroidism (acquired)  Pneumonia - HAP    Hospital Course:   The patient was admitted for the above noted medical/surgical issues. Found to have acute respiratory distress.  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  Given that she was coming from a nursing home facility we treat her for healthcare associated pneumonia as well as acute asthma exacerbation.  With the treatment with IV steroids aggressive pulmonary toilet and antibiotics she very gradually improved.  She was ready for discharge to [place] nursing home facility today in stable condition.  Please see daily progress note for further details concerning their stay. The patient improved throughout their stay and reached maximum medical improvement on the day of discharge. The patient/family agree with the treatment plan as outlined above. All questions concerning their stay were answered prior to discharge. They understand the importance of follow up concerning any abnormal test results.       Discharge Medications:         Discharge Medications      New Medications      Instructions Start Date   calcium carbonate 500 MG chewable tablet  Commonly known as:  TUMS   1 tablet, Oral, 3 Times Daily PRN         Changes to Medications      Instructions Start Date   predniSONE 20 MG tablet  Commonly known as:  DELTASONE  What changed:  how much to take   20 mg, Oral, Daily   Start Date:  2019        Continue These Medications      Instructions Start Date   albuterol sulfate  (90 Base) MCG/ACT  inhaler  Commonly known as:  PROVENTIL HFA;VENTOLIN HFA;PROAIR HFA   2 puffs, Inhalation, Every 6 Hours PRN      albuterol (2.5 MG/3ML) 0.083% nebulizer solution  Commonly known as:  PROVENTIL   3 mL, Nebulization, Every 4 Hours PRN      bumetanide 2 MG tablet  Commonly known as:  BUMEX   2 mg, Oral, 2 Times Daily      cetirizine 10 MG tablet  Commonly known as:  zyrTEC   10 mg, Oral, Daily      levothyroxine 125 MCG tablet  Commonly known as:  SYNTHROID, LEVOTHROID   125 mcg, Oral, Daily      magnesium hydroxide 2400 MG/10ML suspension suspension  Commonly known as:  MILK OF MAGNESIA   10 mL, Oral, Daily PRN      multivitamin tablet tablet   1 tablet, Oral, Daily      nystatin [id number] UNIT/GM powder  Commonly known as:  MYCOSTATIN   1 application, Topical, 3 Times Daily      omeprazole 20 MG capsule  Commonly known as:  priLOSEC   20 mg, Oral, Daily      ondansetron 4 MG tablet  Commonly known as:  ZOFRAN   4 mg, Oral, Every 8 Hours PRN      polyethylene glycol packet  Commonly known as:  MIRALAX   17 g, Oral, Daily      potassium chloride 10 MEQ CR capsule  Commonly known as:  MICRO-K   1 capsule, Oral, 2 Times Daily      primidone 50 MG tablet  Commonly known as:  MYSOLINE   12.5 mg, Oral, Nightly      SINGULAIR 10 MG tablet  Generic drug:  montelukast   1 tablet, Oral, Nightly      spironolactone 50 MG tablet  Commonly known as:  ALDACTONE   50 mg, Oral, Daily         Stop These Medications    aluminum-magnesium hydroxide-simethicone 200-200-20 MG/5ML suspension  Commonly known as:  MAALOX/MYLANTA     furosemide 40 MG tablet  Commonly known as:  LASIX     senna 8.6 MG tablet tablet  Commonly known as:  SENOKOT            Consults:   Pulmonary medicine  Significant Diagnostic Studies:      EKG: normal EKG, normal sinus rhythm, unchanged from previous tracings.  CTA of the chest    Treatments:   IV antibiotics IV steroids and aggressive pulmonary toilet    Disposition: [facility]  Follow up with Ken Brady  MD [place] in 1 week post discharge    Signed:  Ken Brady MD MPH  9/16/2019, 8:42 AM

## 2019-09-16 NOTE — PROGRESS NOTES
Continued Stay Note   [place]     Patient Name: Carlotta Ortiz  MRN: [id number]  Today's Date: 9/16/2019    Admit Date: 9/10/2019    Discharge Plan     Row Name 09/16/19 1024       Plan    Plan  [facility]    Patient/Family in Agreement with Plan  yes    Final Discharge Disposition Code  03 - skilled nursing facility (SNF)    Final Note  Pt is being discharged to [facility] today, pt will be admitted at SNF level care.  Informed Lizzy from [place] of d/c status [phone]. Family in room aware of d/c and pt will need to travel via [facility] ambulance per Veronica TALBOT, [phone].    [facility] [phone]        Discharge Codes    No documentation.       Expected Discharge Date and Time     Expected Discharge Date Expected Discharge Time    Sep 16, 2019             BRYAN Silva

## 2019-09-16 NOTE — DISCHARGE PLACEMENT REQUEST
"Nevin Márquez [phone]  Zonia Ritchie (79 y.o. Female)     Date of Birth Social Security Number Address Home Phone MRN    1940  [address]   [facility] [id number] [phone] [id number]    Orthodox Marital Status          Other        Admission Date Admission Type Admitting Provider Attending Provider Department, Room/Bed    9/10/19 Emergency Ken Brady MD [place], James Kyle, MD [address], 484/1    Discharge Date Discharge Disposition Discharge Destination         Skilled Nursing Facility (DC - External)              Attending Provider:  Ken Brady MD    Allergies:  Aminophylline, Cephalexin, Spiractone [Spironolactone], Sulfa Antibiotics, Sulfacetamide Sodium, Ampicillin, Penicillins    Isolation:  None   Infection:  None   Code Status:  CPR    Ht:  154.9 cm (61\")   Wt:  111 kg (245 lb 2.4 oz)    Admission Cmt:  None   Principal Problem:  Moderate persistent asthma with acute exacerbation [J45.41]                 Active Insurance as of 9/10/2019     Primary Coverage     Payor Plan Insurance Group Employer/Plan Group    MEDICARE MEDICARE A & B      Payor Plan Address Payor Plan Phone Number Payor Plan Fax Number Effective Dates    [address] [phone]  12/1/1998 - None Entered    [facility] [id number]       Subscriber Name Subscriber Birth Date Member ID       ZONIA RITCHIE 1940 [id number]           Secondary Coverage     Payor Plan Insurance Group Employer/Plan Group    [place]      Payor Plan Address Payor Plan Phone Number Payor Plan Fax Number Effective Dates    [address] [phone]  4/20/2017 - None Entered    [facility] [place] [address]       Subscriber Name Subscriber Birth Date Member ID       ZONIA RITCHIE 1940 [id number]                 Emergency Contacts      (Rel.) Home Phone Work Phone Mobile Phone    Mary Anne Barraza (Power of ) -- -- [phone]    [place] Ritchie (Power of ) " -- -- [phone]

## 2019-09-18 ENCOUNTER — LAB REQUISITION (OUTPATIENT)
Dept: LAB | Facility: HOSPITAL | Age: 79
End: 2019-09-18

## 2019-09-18 DIAGNOSIS — Z00.00 ENCOUNTER FOR GENERAL ADULT MEDICAL EXAMINATION WITHOUT ABNORMAL FINDINGS: ICD-10-CM

## 2019-09-18 LAB
ALBUMIN SERPL-MCNC: 3.3 G/DL (ref 3.5–5.2)
ALBUMIN/GLOB SERPL: 1.7 G/DL
ALP SERPL-CCNC: 81 U/L (ref 39–117)
ALT SERPL W P-5'-P-CCNC: 53 U/L (ref 1–33)
ANION GAP SERPL CALCULATED.3IONS-SCNC: 10 MMOL/L (ref 5–15)
AST SERPL-CCNC: 23 U/L (ref 1–32)
BILIRUB SERPL-MCNC: 0.4 MG/DL (ref 0.2–1.2)
BUN BLD-MCNC: 33 MG/DL (ref 8–23)
BUN/CREAT SERPL: 32 (ref 7–25)
CALCIUM SPEC-SCNC: 8.6 MG/DL (ref 8.6–10.5)
CHLORIDE SERPL-SCNC: 100 MMOL/L (ref 98–107)
CO2 SERPL-SCNC: 33 MMOL/L (ref 22–29)
CREAT BLD-MCNC: 1.03 MG/DL (ref 0.57–1)
DEPRECATED RDW RBC AUTO: 54.4 FL (ref 37–54)
EOSINOPHIL # BLD MANUAL: 0.11 10*3/MM3 (ref 0–0.4)
EOSINOPHIL NFR BLD MANUAL: 1 % (ref 0.3–6.2)
ERYTHROCYTE [DISTWIDTH] IN BLOOD BY AUTOMATED COUNT: 14.6 % (ref 12.3–15.4)
GFR SERPL CREATININE-BSD FRML MDRD: 52 ML/MIN/1.73
GLOBULIN UR ELPH-MCNC: 1.9 GM/DL
GLUCOSE BLD-MCNC: 170 MG/DL (ref 65–99)
HBA1C MFR BLD: 7.1 % (ref 4.8–5.6)
HCT VFR BLD AUTO: 44.1 % (ref 34–46.6)
HGB BLD-MCNC: 14.6 G/DL (ref 12–15.9)
LYMPHOCYTES # BLD MANUAL: 0.54 10*3/MM3 (ref 0.7–3.1)
LYMPHOCYTES NFR BLD MANUAL: 1 % (ref 5–12)
LYMPHOCYTES NFR BLD MANUAL: 5.1 % (ref 19.6–45.3)
MACROCYTES BLD QL SMEAR: ABNORMAL
MCH RBC QN AUTO: 33.1 PG (ref 26.6–33)
MCHC RBC AUTO-ENTMCNC: 33.1 G/DL (ref 31.5–35.7)
MCV RBC AUTO: 100 FL (ref 79–97)
METAMYELOCYTES NFR BLD MANUAL: 1 % (ref 0–0)
MONOCYTES # BLD AUTO: 0.11 10*3/MM3 (ref 0.1–0.9)
MYELOCYTES NFR BLD MANUAL: 1 % (ref 0–0)
NEUTROPHILS # BLD AUTO: 9.47 10*3/MM3 (ref 1.7–7)
NEUTROPHILS NFR BLD MANUAL: 88.9 % (ref 42.7–76)
PLATELET # BLD AUTO: 135 10*3/MM3 (ref 140–450)
PMV BLD AUTO: 10.3 FL (ref 6–12)
POIKILOCYTOSIS BLD QL SMEAR: ABNORMAL
POTASSIUM BLD-SCNC: 3.9 MMOL/L (ref 3.5–5.2)
PROT SERPL-MCNC: 5.2 G/DL (ref 6–8.5)
RBC # BLD AUTO: 4.41 10*6/MM3 (ref 3.77–5.28)
SMALL PLATELETS BLD QL SMEAR: ABNORMAL
SODIUM BLD-SCNC: 143 MMOL/L (ref 136–145)
SPHEROCYTES BLD QL SMEAR: ABNORMAL
TSH SERPL DL<=0.05 MIU/L-ACNC: 0.86 UIU/ML (ref 0.27–4.2)
VARIANT LYMPHS NFR BLD MANUAL: 2 % (ref 0–5)
WBC MORPH BLD: NORMAL
WBC NRBC COR # BLD: 10.65 10*3/MM3 (ref 3.4–10.8)

## 2019-09-18 PROCEDURE — 84443 ASSAY THYROID STIM HORMONE: CPT | Performed by: NURSE PRACTITIONER

## 2019-09-18 PROCEDURE — 83036 HEMOGLOBIN GLYCOSYLATED A1C: CPT | Performed by: NURSE PRACTITIONER

## 2019-09-18 PROCEDURE — 80053 COMPREHEN METABOLIC PANEL: CPT | Performed by: NURSE PRACTITIONER

## 2019-09-18 PROCEDURE — 85025 COMPLETE CBC W/AUTO DIFF WBC: CPT | Performed by: NURSE PRACTITIONER

## 2019-09-18 PROCEDURE — 36415 COLL VENOUS BLD VENIPUNCTURE: CPT | Performed by: NURSE PRACTITIONER

## 2020-10-26 ENCOUNTER — TELEPHONE (OUTPATIENT)
Dept: GASTROENTEROLOGY | Age: 80
End: 2020-10-26

## 2020-10-26 NOTE — TELEPHONE ENCOUNTER
10-26-20 Called spoke with [facility] requested Medical Referral Notes from Dr Paul Coleman regarding this patient. She stated that she will get with medical records and have them fax them over to our office.  Cv

## 2020-10-27 ENCOUNTER — OFFICE VISIT (OUTPATIENT)
Dept: GASTROENTEROLOGY | Age: 80
End: 2020-10-27
Payer: MEDICARE

## 2020-10-27 VITALS
WEIGHT: 243 LBS | BODY MASS INDEX: 43.05 KG/M2 | OXYGEN SATURATION: 96 % | HEIGHT: 63 IN | DIASTOLIC BLOOD PRESSURE: 74 MMHG | HEART RATE: 86 BPM | SYSTOLIC BLOOD PRESSURE: 138 MMHG

## 2020-10-27 PROCEDURE — 1036F TOBACCO NON-USER: CPT | Performed by: NURSE PRACTITIONER

## 2020-10-27 PROCEDURE — G8400 PT W/DXA NO RESULTS DOC: HCPCS | Performed by: NURSE PRACTITIONER

## 2020-10-27 PROCEDURE — 99214 OFFICE O/P EST MOD 30 MIN: CPT | Performed by: NURSE PRACTITIONER

## 2020-10-27 PROCEDURE — G8417 CALC BMI ABV UP PARAM F/U: HCPCS | Performed by: NURSE PRACTITIONER

## 2020-10-27 PROCEDURE — G8484 FLU IMMUNIZE NO ADMIN: HCPCS | Performed by: NURSE PRACTITIONER

## 2020-10-27 PROCEDURE — G8427 DOCREV CUR MEDS BY ELIG CLIN: HCPCS | Performed by: NURSE PRACTITIONER

## 2020-10-27 PROCEDURE — 1123F ACP DISCUSS/DSCN MKR DOCD: CPT | Performed by: NURSE PRACTITIONER

## 2020-10-27 PROCEDURE — 4040F PNEUMOC VAC/ADMIN/RCVD: CPT | Performed by: NURSE PRACTITIONER

## 2020-10-27 PROCEDURE — 1090F PRES/ABSN URINE INCON ASSESS: CPT | Performed by: NURSE PRACTITIONER

## 2020-10-27 RX ORDER — PROMETHAZINE HYDROCHLORIDE 25 MG/1
25 TABLET ORAL EVERY 6 HOURS PRN
COMMUNITY

## 2020-10-27 RX ORDER — LACTULOSE 10 G/15ML
30 SOLUTION ORAL DAILY
COMMUNITY

## 2020-10-27 RX ORDER — BUSPIRONE HYDROCHLORIDE 15 MG/1
7.5 TABLET ORAL 2 TIMES DAILY
COMMUNITY

## 2020-10-27 RX ORDER — POLYETHYLENE GLYCOL 3350 17 G/17G
17 POWDER, FOR SOLUTION ORAL 2 TIMES DAILY
COMMUNITY

## 2020-10-27 RX ORDER — PANTOPRAZOLE SODIUM 40 MG/1
40 GRANULE, DELAYED RELEASE ORAL
COMMUNITY

## 2020-10-27 RX ORDER — CITALOPRAM 10 MG/1
10 TABLET ORAL DAILY
COMMUNITY

## 2020-10-27 RX ORDER — DOCUSATE SODIUM 100 MG/1
100 CAPSULE, LIQUID FILLED ORAL DAILY
COMMUNITY

## 2020-10-27 ASSESSMENT — ENCOUNTER SYMPTOMS
RECTAL PAIN: 0
COUGH: 0
TROUBLE SWALLOWING: 0
ANAL BLEEDING: 0
SHORTNESS OF BREATH: 0
CONSTIPATION: 0
NAUSEA: 1
DIARRHEA: 0
BLOOD IN STOOL: 0
ABDOMINAL PAIN: 1
CHOKING: 0
VOMITING: 0
ABDOMINAL DISTENTION: 0

## 2020-10-27 NOTE — PATIENT INSTRUCTIONS
May use Pepcid 20 mg po BID prn for dyspepsia    Patient Education        famotidine (oral/injection)  Pronunciation:  fam OH kalpana brennan  Brand:  Heartburn Relief, Leader Acid Reducer, Pepcid, Pepcid AC, Pepcid AC Maximum Strength  What is the most important information I should know about famotidine? Follow all directions on your medicine label and package. Tell each of your healthcare providers about all your medical conditions, allergies, and all medicines you use. What is famotidine? Famotidine is used to treat and prevent ulcers in the stomach and intestines. It also treats conditions in which the stomach produces too much acid, such as Zollinger-Kumar syndrome. Famotidine also treats gastroesophageal reflux disease (GERD) and other conditions in which acid backs up from the stomach into the esophagus, causing heartburn. Famotidine may also be used for purposes not listed in this medication guide. What should I discuss with my healthcare provider before taking famotidine? Heartburn can mimic early symptoms of a heart attack. Get emergency medical help if you have chest pain that spreads to your jaw or shoulder and you feel anxious or light-headed. You should not use this medicine if you are allergic to famotidine or similar medicines such as ranitidine (Zantac), cimetidine (Tagamet), or nizatidine (Axid). Ask a doctor or pharmacist if this medicine is safe to use if you have:  · kidney disease;  · liver disease;  · cancer stomach; or  · long QT syndrome (in you or a family member). Ask a doctor before using this medicine if you are pregnant or breastfeeding. How should I take famotidine? Use exactly as directed on the label, or as prescribed by your doctor. Famotidine oral is taken by mouth. Famotidine injection is given as an infusion into a vein. A healthcare provider will give you this injection if you are unable to take the medicine by mouth.   You may take famotidine oral  with or without food.  Measure liquid medicine carefully. Use the dosing syringe provided, or use a medicine dose-measuring device (not a kitchen spoon). Most ulcers heal within 4 weeks of famotidine treatment, but it may take up to 8 weeks of using this medicine before your ulcer heals. Keep using the medication as directed. Call your doctor if the condition you are treating with famotidine does not improve, or if it gets worse while using famotidine. Famotidine may be only part of a complete program of treatment that also includes changes in diet or lifestyle habits. Follow all instructions of your doctor or dietitian. Store at room temperature away from moisture, heat, and light. Do not allow the liquid  medicine to freeze. Throw away any unused famotidine liquid that is older than 30 days. What happens if I miss a dose? Take the medicine as soon as you can, but skip the missed dose if it is almost time for your next dose. Do not take two doses at one time. Because you will receive famotidine injection in a clinical setting, you are not likely to miss a dose. What happens if I overdose? Seek emergency medical attention or call the Poison Help line at 1-908.693.2314. What should I avoid while taking famotidine? Avoid drinking alcohol. It can increase the risk of damage to your stomach. Avoid taking other stomach acid reducers unless your doctor has told you to. However, you may take an antacid (such as Maalox, Mylanta, Gaviscon, Milk of Magnesia, Rolaids, or Tums) with famotidine. What are the possible side effects of famotidine? Get emergency medical help if you have signs of an allergic reaction: hives; difficult breathing; swelling of your face, lips, tongue, or throat.   Stop using famotidine and call your doctor at once if you have:  · confusion, hallucinations, agitation, lack of energy;  · a seizure;  · fast or pounding heartbeats, sudden dizziness (like you might pass out); or  · unexplained muscle pain, tenderness, or weakness especially if you also have fever, unusual tiredness, and dark colored urine. Some side effects may be more likely in older adults and in people who have severe kidney disease. Common side effects may include:  · headache;  · dizziness; or  · constipation or diarrhea. This is not a complete list of side effects and others may occur. Call your doctor for medical advice about side effects. You may report side effects to FDA at 3-334-VWC-0138. What other drugs will affect famotidine? Famotidine oral can make it harder for your body to absorb other medicines you take by mouth. Tell your doctor if you are taking:  · cefditoren;  · dasatinib;  · delavirdine;  · fosamprenavir; or  · tizanidine (if you are taking famotidine liquid). This list is not complete. Other drugs may affect famotidine oral or injection, including prescription and over-the-counter medicines, vitamins, and herbal products. Not all possible drug interactions are listed here. Where can I get more information? Your doctor or pharmacist can provide more information about famotidine. Remember, keep this and all other medicines out of the reach of children, never share your medicines with others, and use this medication only for the indication prescribed. Every effort has been made to ensure that the information provided by [address] [address] is accurate, up-to-date, and complete, but no guarantee is made to that effect. Drug information contained herein may be time sensitive. [facility] information has been compiled for use by healthcare practitioners and consumers in the [facility] [facility] and therefore [facility] does not warrant that uses outside of the [facility] [facility] are appropriate, unless specifically indicated otherwise. [facility]'s drug information does not endorse drugs, diagnose patients or recommend therapy.  [facility] drug information is an informational resource designed to assist licensed healthcare practitioners

## 2020-10-27 NOTE — PROGRESS NOTES
Polyps Neg Hx     Esophageal Cancer Neg Hx     Irritable Bowel Syndrome Neg Hx     Liver Cancer Neg Hx     Liver Disease Neg Hx     Rectal Cancer Neg Hx     Stomach Cancer Neg Hx        Social History     Socioeconomic History    Marital status:       Spouse name: None    Number of children: None    Years of education: None    Highest education level: None   Occupational History    None   Social Needs    Financial resource strain: None    Food insecurity     Worry: None     Inability: None    Transportation needs     Medical: None     Non-medical: None   Tobacco Use    Smoking status: Never Smoker    Smokeless tobacco: Never Used   Substance and Sexual Activity    Alcohol use: Never     Frequency: Never    Drug use: Never    Sexual activity: None   Lifestyle    Physical activity     Days per week: None     Minutes per session: None    Stress: None   Relationships    Social connections     Talks on phone: None     Gets together: None     Attends Oriental orthodox service: None     Active member of club or organization: None     Attends meetings of clubs or organizations: None     Relationship status: None    Intimate partner violence     Fear of current or ex partner: None     Emotionally abused: None     Physically abused: None     Forced sexual activity: None   Other Topics Concern    None   Social History Narrative    None       Current Outpatient Medications   Medication Sig Dispense Refill    tuberculin (TUBERSOL) 5 UNIT/0.1ML injection Inject 5 Units into the skin once      pantoprazole sodium (PROTONIX) 40 MG PACK packet Take 40 mg by mouth every morning (before breakfast)      citalopram (CELEXA) 10 MG tablet Take 10 mg by mouth daily      vitamin D 25 MCG (1000 UT) CAPS Take by mouth daily 2caps      glucagon 1 MG injection Inject 1 kit into the muscle as needed      lactulose (CHRONULAC) 10 GM/15ML solution Take 30 g by mouth daily      Zinc Oxide 10 % OINT Apply topically as needed      docusate sodium (COLACE) 100 MG capsule Take 100 mg by mouth daily      polyethylene glycol (MIRALAX) 17 g PACK packet Take 17 g by mouth 2 times daily      busPIRone (BUSPAR) 15 MG tablet Take 7.5 mg by mouth 2 times daily      promethazine (PHENERGAN) 25 MG tablet Take 25 mg by mouth every 6 hours as needed for Nausea      albuterol sulfate  (90 Base) MCG/ACT inhaler Inhale 2 puffs into the lungs      magnesium hydroxide (MILK OF MAGNESIA CONCENTRATE) 2400 MG/10ML SUSP Take 10 mLs by mouth      ondansetron (ZOFRAN) 4 MG tablet Take 4 mg by mouth      bumetanide (BUMEX) 1 MG tablet Take 2 mg by mouth      cetirizine (ZYRTEC) 10 MG tablet Take 10 mg by mouth      levothyroxine (SYNTHROID) 125 MCG tablet Take 125 mcg by mouth      montelukast (SINGULAIR) 10 MG tablet Take 1 tablet by mouth daily       potassium chloride (MICRO-K) 10 MEQ extended release capsule Take 20 mEq by mouth daily       predniSONE (DELTASONE) 20 MG tablet Take 40 mg by mouth      albuterol (PROVENTIL) (2.5 MG/3ML) 0.083% nebulizer solution Inhale 3 mLs into the lungs      primidone (MYSOLINE) 50 MG tablet Take 12.25 mg by mouth nightly       furosemide (LASIX) 40 MG tablet Take 40 mg by mouth       No current facility-administered medications for this visit.         Allergies   Allergen Reactions    Aminophylline Other (See Comments)     Patient unsure of reaction     Ampicillin Other (See Comments)     Patient unsure of reaction    Cephalosporins Other (See Comments)     Patient unsure of reaction    Ciprofloxacin Other (See Comments)     Patient unsure of reaction    Erythromycin Other (See Comments)     Patient unsure of reaction    Keflex [Cephalexin] Other (See Comments)     Patient unsure of reaction    Other Other (See Comments)     Sodium-Patient unsure of reaction      Penicillamine Other (See Comments)     Patient unsure of reaction    Penicillins Other (See Comments)     Patient unsure of reaction    Spironolactone Other (See Comments)     Patient unsure of reaction    Sulfa Antibiotics Other (See Comments)     Patient unsure of reaction    Sulfacetamide Other (See Comments)     Patient unsure of reaction       Review of Systems   Constitutional: Positive for fatigue. Negative for activity change, appetite change, fever and unexpected weight change. HENT: Negative for trouble swallowing. Respiratory: Negative for cough, choking and shortness of breath. Cardiovascular: Positive for leg swelling. Negative for chest pain. Gastrointestinal: Positive for abdominal pain and nausea. Negative for abdominal distention, anal bleeding, blood in stool, constipation, diarrhea, rectal pain and vomiting. Musculoskeletal: Positive for gait problem. Allergic/Immunologic: Negative for food allergies. Neurological: Positive for weakness. All other systems reviewed and are negative. Objective:     /74   Pulse 86   Ht 5' 2.5\" (1.588 m)   Wt 243 lb (110.2 kg)   SpO2 96%   BMI 43.74 kg/m²     Physical Exam  Vitals signs reviewed. Constitutional:       General: She is not in acute distress. Appearance: She is well-developed. She is ill-appearing. HENT:      Head: Normocephalic and atraumatic. Right Ear: External ear normal.      Left Ear: External ear normal.      Nose: Nose normal.   Eyes:      Conjunctiva/sclera: Conjunctivae normal.      Pupils: Pupils are equal, round, and reactive to light. Neck:      Musculoskeletal: Normal range of motion and neck supple. Cardiovascular:      Rate and Rhythm: Normal rate and regular rhythm. Heart sounds: Normal heart sounds. No murmur. No friction rub. No gallop. Pulmonary:      Effort: Pulmonary effort is normal. No respiratory distress. Breath sounds: Decreased breath sounds and wheezing present. Comments: On O2 per NC  Abdominal:      General: Bowel sounds are normal. There is no distension.       Palpations:

## 2020-10-27 NOTE — TELEPHONE ENCOUNTER
10-27-20 Called and spoke with Derek TAO [facility] [phone]) requested most recent labs and referral notes from Dr Vianney Lima re n/v & abd pain. Derek Caraballo stated that she would get with the nurse and have them sent over.  Cv

## 2021-01-01 ENCOUNTER — APPOINTMENT (OUTPATIENT)
Dept: CT IMAGING | Facility: HOSPITAL | Age: 81
End: 2021-01-01

## 2021-01-01 ENCOUNTER — APPOINTMENT (OUTPATIENT)
Dept: GENERAL RADIOLOGY | Facility: HOSPITAL | Age: 81
End: 2021-01-01

## 2021-01-01 ENCOUNTER — HOSPITAL ENCOUNTER (INPATIENT)
Facility: HOSPITAL | Age: 81
LOS: 1 days | End: 2021-01-07
Attending: EMERGENCY MEDICINE | Admitting: FAMILY MEDICINE

## 2021-01-01 VITALS
BODY MASS INDEX: 45.08 KG/M2 | RESPIRATION RATE: 18 BRPM | WEIGHT: 245 LBS | OXYGEN SATURATION: 72 % | TEMPERATURE: 98 F | SYSTOLIC BLOOD PRESSURE: 59 MMHG | DIASTOLIC BLOOD PRESSURE: 30 MMHG | HEIGHT: 62 IN

## 2021-01-01 DIAGNOSIS — R65.20 SEPSIS WITH ACUTE ORGAN DYSFUNCTION, DUE TO UNSPECIFIED ORGANISM, UNSPECIFIED TYPE, UNSPECIFIED WHETHER SEPTIC SHOCK PRESENT (HCC): Primary | ICD-10-CM

## 2021-01-01 DIAGNOSIS — A41.9 SEPSIS WITH ACUTE ORGAN DYSFUNCTION, DUE TO UNSPECIFIED ORGANISM, UNSPECIFIED TYPE, UNSPECIFIED WHETHER SEPTIC SHOCK PRESENT (HCC): Primary | ICD-10-CM

## 2021-01-01 LAB
ABO GROUP BLD: NORMAL
ALBUMIN SERPL-MCNC: 2.1 G/DL (ref 3.5–5.2)
ALBUMIN/GLOB SERPL: 0.8 G/DL
ALP SERPL-CCNC: 239 U/L (ref 39–117)
ALT SERPL W P-5'-P-CCNC: 12 U/L (ref 1–33)
ANION GAP SERPL CALCULATED.3IONS-SCNC: 13 MMOL/L (ref 5–15)
APTT PPP: 27.6 SECONDS (ref 24.1–35)
ARTERIAL PATENCY WRIST A: ABNORMAL
AST SERPL-CCNC: 31 U/L (ref 1–32)
ATMOSPHERIC PRESS: 752 MMHG
BACTERIA UR QL AUTO: ABNORMAL /HPF
BASE EXCESS BLDA CALC-SCNC: -2.8 MMOL/L (ref 0–2)
BASOPHILS # BLD AUTO: 0.16 10*3/MM3 (ref 0–0.2)
BASOPHILS NFR BLD AUTO: 0.4 % (ref 0–1.5)
BDY SITE: ABNORMAL
BILIRUB SERPL-MCNC: 0.9 MG/DL (ref 0–1.2)
BILIRUB UR QL STRIP: ABNORMAL
BLD GP AB SCN SERPL QL: NEGATIVE
BODY TEMPERATURE: 37 C
BUN SERPL-MCNC: 23 MG/DL (ref 8–23)
BUN/CREAT SERPL: 12.2 (ref 7–25)
CALCIUM SPEC-SCNC: 8.5 MG/DL (ref 8.6–10.5)
CHLORIDE SERPL-SCNC: 94 MMOL/L (ref 98–107)
CLARITY UR: ABNORMAL
CO2 SERPL-SCNC: 22 MMOL/L (ref 22–29)
COLOR UR: ABNORMAL
CREAT SERPL-MCNC: 1.89 MG/DL (ref 0.57–1)
D DIMER PPP FEU-MCNC: 1.57 MG/L (FEU) (ref 0–0.5)
D-LACTATE SERPL-SCNC: 1.9 MMOL/L (ref 0.5–2)
DEPRECATED RDW RBC AUTO: 53.9 FL (ref 37–54)
EOSINOPHIL # BLD AUTO: 0 10*3/MM3 (ref 0–0.4)
EOSINOPHIL NFR BLD AUTO: 0 % (ref 0.3–6.2)
ERYTHROCYTE [DISTWIDTH] IN BLOOD BY AUTOMATED COUNT: 16.2 % (ref 12.3–15.4)
GFR SERPL CREATININE-BSD FRML MDRD: 26 ML/MIN/1.73
GLOBULIN UR ELPH-MCNC: 2.6 GM/DL
GLUCOSE SERPL-MCNC: 99 MG/DL (ref 65–99)
GLUCOSE UR STRIP-MCNC: NEGATIVE MG/DL
HCO3 BLDA-SCNC: 19.7 MMOL/L (ref 20–26)
HCT VFR BLD AUTO: 36.2 % (ref 34–46.6)
HGB BLD-MCNC: 12.9 G/DL (ref 12–15.9)
HGB UR QL STRIP.AUTO: ABNORMAL
INHALED O2 CONCENTRATION: 100 %
INR PPP: 1.01 (ref 0.91–1.09)
KETONES UR QL STRIP: NEGATIVE
LEUKOCYTE ESTERASE UR QL STRIP.AUTO: ABNORMAL
LYMPHOCYTES # BLD AUTO: 1.26 10*3/MM3 (ref 0.7–3.1)
LYMPHOCYTES NFR BLD AUTO: 3.5 % (ref 19.6–45.3)
Lab: ABNORMAL
MAGNESIUM SERPL-MCNC: 1.5 MG/DL (ref 1.6–2.4)
MCH RBC QN AUTO: 32.9 PG (ref 26.6–33)
MCHC RBC AUTO-ENTMCNC: 35.6 G/DL (ref 31.5–35.7)
MCV RBC AUTO: 92.3 FL (ref 79–97)
MODALITY: ABNORMAL
MONOCYTES # BLD AUTO: 0.78 10*3/MM3 (ref 0.1–0.9)
MONOCYTES NFR BLD AUTO: 2.2 % (ref 5–12)
NEUTROPHILS NFR BLD AUTO: 33.15 10*3/MM3 (ref 1.7–7)
NEUTROPHILS NFR BLD AUTO: 91.9 % (ref 42.7–76)
NITRITE UR QL STRIP: NEGATIVE
NT-PROBNP SERPL-MCNC: 3103 PG/ML (ref 0–1800)
PCO2 BLDA: 27.4 MM HG (ref 35–45)
PCO2 TEMP ADJ BLD: 27.4 MM HG (ref 35–45)
PEEP RESPIRATORY: 5 CM[H2O]
PH BLDA: 7.47 PH UNITS (ref 7.35–7.45)
PH UR STRIP.AUTO: 6 [PH] (ref 5–8)
PH, TEMP CORRECTED: 7.47 PH UNITS (ref 7.35–7.45)
PLATELET # BLD AUTO: 270 10*3/MM3 (ref 140–450)
PMV BLD AUTO: 9.4 FL (ref 6–12)
PO2 BLDA: 404 MM HG (ref 83–108)
PO2 TEMP ADJ BLD: 404 MM HG (ref 83–108)
POTASSIUM SERPL-SCNC: 5 MMOL/L (ref 3.5–5.2)
PROT SERPL-MCNC: 4.7 G/DL (ref 6–8.5)
PROT UR QL STRIP: ABNORMAL
PROTHROMBIN TIME: 12.9 SECONDS (ref 11.9–14.6)
RBC # BLD AUTO: 3.92 10*6/MM3 (ref 3.77–5.28)
RBC # UR: ABNORMAL /HPF
REF LAB TEST METHOD: ABNORMAL
RH BLD: POSITIVE
SAO2 % BLDCOA: >100.1 % (ref 94–99)
SARS-COV-2 RNA PNL SPEC NAA+PROBE: NOT DETECTED
SET MECH RESP RATE: 12
SODIUM SERPL-SCNC: 129 MMOL/L (ref 136–145)
SP GR UR STRIP: 1.01 (ref 1–1.03)
SQUAMOUS #/AREA URNS HPF: ABNORMAL /HPF
T&S EXPIRATION DATE: NORMAL
TROPONIN T SERPL-MCNC: 0.23 NG/ML (ref 0–0.03)
TSH SERPL DL<=0.05 MIU/L-ACNC: 58.1 UIU/ML (ref 0.27–4.2)
UROBILINOGEN UR QL STRIP: ABNORMAL
VENTILATOR MODE: AC
VT ON VENT VENT: 500 ML
WBC # BLD AUTO: 36.07 10*3/MM3 (ref 3.4–10.8)
WBC UR QL AUTO: ABNORMAL /HPF

## 2021-01-01 PROCEDURE — 94799 UNLISTED PULMONARY SVC/PX: CPT

## 2021-01-01 PROCEDURE — 85730 THROMBOPLASTIN TIME PARTIAL: CPT | Performed by: EMERGENCY MEDICINE

## 2021-01-01 PROCEDURE — 85379 FIBRIN DEGRADATION QUANT: CPT | Performed by: EMERGENCY MEDICINE

## 2021-01-01 PROCEDURE — 83735 ASSAY OF MAGNESIUM: CPT | Performed by: EMERGENCY MEDICINE

## 2021-01-01 PROCEDURE — 70450 CT HEAD/BRAIN W/O DYE: CPT

## 2021-01-01 PROCEDURE — 80053 COMPREHEN METABOLIC PANEL: CPT | Performed by: EMERGENCY MEDICINE

## 2021-01-01 PROCEDURE — 36600 WITHDRAWAL OF ARTERIAL BLOOD: CPT

## 2021-01-01 PROCEDURE — 87635 SARS-COV-2 COVID-19 AMP PRB: CPT | Performed by: EMERGENCY MEDICINE

## 2021-01-01 PROCEDURE — 25010000002 SUCCINYLCHOLINE PER 20 MG: Performed by: EMERGENCY MEDICINE

## 2021-01-01 PROCEDURE — 86900 BLOOD TYPING SEROLOGIC ABO: CPT | Performed by: EMERGENCY MEDICINE

## 2021-01-01 PROCEDURE — 82803 BLOOD GASES ANY COMBINATION: CPT

## 2021-01-01 PROCEDURE — 25010000002 MORPHINE (PF) 10 MG/ML SOLUTION: Performed by: FAMILY MEDICINE

## 2021-01-01 PROCEDURE — 84443 ASSAY THYROID STIM HORMONE: CPT | Performed by: EMERGENCY MEDICINE

## 2021-01-01 PROCEDURE — 87040 BLOOD CULTURE FOR BACTERIA: CPT | Performed by: EMERGENCY MEDICINE

## 2021-01-01 PROCEDURE — 87077 CULTURE AEROBIC IDENTIFY: CPT | Performed by: EMERGENCY MEDICINE

## 2021-01-01 PROCEDURE — 86901 BLOOD TYPING SEROLOGIC RH(D): CPT | Performed by: EMERGENCY MEDICINE

## 2021-01-01 PROCEDURE — 51702 INSERT TEMP BLADDER CATH: CPT

## 2021-01-01 PROCEDURE — 25010000002 LORAZEPAM PER 2 MG: Performed by: FAMILY MEDICINE

## 2021-01-01 PROCEDURE — 86850 RBC ANTIBODY SCREEN: CPT | Performed by: EMERGENCY MEDICINE

## 2021-01-01 PROCEDURE — 94002 VENT MGMT INPAT INIT DAY: CPT

## 2021-01-01 PROCEDURE — 83605 ASSAY OF LACTIC ACID: CPT | Performed by: EMERGENCY MEDICINE

## 2021-01-01 PROCEDURE — 99285 EMERGENCY DEPT VISIT HI MDM: CPT

## 2021-01-01 PROCEDURE — 85025 COMPLETE CBC W/AUTO DIFF WBC: CPT | Performed by: EMERGENCY MEDICINE

## 2021-01-01 PROCEDURE — 93010 ELECTROCARDIOGRAM REPORT: CPT | Performed by: EMERGENCY MEDICINE

## 2021-01-01 PROCEDURE — 71045 X-RAY EXAM CHEST 1 VIEW: CPT

## 2021-01-01 PROCEDURE — 93005 ELECTROCARDIOGRAM TRACING: CPT | Performed by: EMERGENCY MEDICINE

## 2021-01-01 PROCEDURE — 84484 ASSAY OF TROPONIN QUANT: CPT | Performed by: EMERGENCY MEDICINE

## 2021-01-01 PROCEDURE — 85610 PROTHROMBIN TIME: CPT | Performed by: EMERGENCY MEDICINE

## 2021-01-01 PROCEDURE — 87186 SC STD MICRODIL/AGAR DIL: CPT | Performed by: EMERGENCY MEDICINE

## 2021-01-01 PROCEDURE — 81001 URINALYSIS AUTO W/SCOPE: CPT | Performed by: EMERGENCY MEDICINE

## 2021-01-01 PROCEDURE — 83880 ASSAY OF NATRIURETIC PEPTIDE: CPT | Performed by: EMERGENCY MEDICINE

## 2021-01-01 PROCEDURE — 25010000002 LEVOFLOXACIN PER 250 MG: Performed by: EMERGENCY MEDICINE

## 2021-01-01 PROCEDURE — 31500 INSERT EMERGENCY AIRWAY: CPT

## 2021-01-01 PROCEDURE — 25010000002 VANCOMYCIN PER 500 MG: Performed by: EMERGENCY MEDICINE

## 2021-01-01 PROCEDURE — 87086 URINE CULTURE/COLONY COUNT: CPT | Performed by: EMERGENCY MEDICINE

## 2021-01-01 RX ORDER — VANCOMYCIN HYDROCHLORIDE 1 G/200ML
1000 INJECTION, SOLUTION INTRAVENOUS ONCE
Status: COMPLETED | OUTPATIENT
Start: 2021-01-01 | End: 2021-01-01

## 2021-01-01 RX ORDER — MORPHINE SULFATE 10 MG/ML
10 INJECTION INTRAMUSCULAR; INTRAVENOUS; SUBCUTANEOUS
Status: DISCONTINUED | OUTPATIENT
Start: 2021-01-01 | End: 2021-01-08 | Stop reason: HOSPADM

## 2021-01-01 RX ORDER — LEVOFLOXACIN 5 MG/ML
750 INJECTION, SOLUTION INTRAVENOUS ONCE
Status: COMPLETED | OUTPATIENT
Start: 2021-01-01 | End: 2021-01-01

## 2021-01-01 RX ORDER — SUCCINYLCHOLINE CHLORIDE 20 MG/ML
100 INJECTION INTRAMUSCULAR; INTRAVENOUS ONCE
Status: COMPLETED | OUTPATIENT
Start: 2021-01-01 | End: 2021-01-01

## 2021-01-01 RX ORDER — MORPHINE SULFATE 1 MG/ML
5 INJECTION, SOLUTION INTRAVENOUS CONTINUOUS
Status: DISCONTINUED | OUTPATIENT
Start: 2021-01-01 | End: 2021-01-08 | Stop reason: HOSPADM

## 2021-01-01 RX ORDER — ETOMIDATE 2 MG/ML
20 INJECTION INTRAVENOUS ONCE
Status: COMPLETED | OUTPATIENT
Start: 2021-01-01 | End: 2021-01-01

## 2021-01-01 RX ORDER — SODIUM CHLORIDE 0.9 % (FLUSH) 0.9 %
10 SYRINGE (ML) INJECTION AS NEEDED
Status: DISCONTINUED | OUTPATIENT
Start: 2021-01-01 | End: 2021-01-08 | Stop reason: HOSPADM

## 2021-01-01 RX ADMIN — SODIUM CHLORIDE 1503 ML: 9 INJECTION, SOLUTION INTRAVENOUS at 17:00

## 2021-01-01 RX ADMIN — SUCCINYLCHOLINE CHLORIDE 100 MG: 20 INJECTION, SOLUTION INTRAMUSCULAR; INTRAVENOUS at 13:10

## 2021-01-01 RX ADMIN — MORPHINE SULFATE 5 MG/HR: 10 INJECTION, SOLUTION INTRAMUSCULAR; INTRAVENOUS at 20:17

## 2021-01-01 RX ADMIN — LEVOFLOXACIN 750 MG: 5 INJECTION, SOLUTION INTRAVENOUS at 16:32

## 2021-01-01 RX ADMIN — ETOMIDATE 20 MG: 2 INJECTION, SOLUTION INTRAVENOUS at 13:10

## 2021-01-01 RX ADMIN — VANCOMYCIN HYDROCHLORIDE 1000 MG: 1 INJECTION, SOLUTION INTRAVENOUS at 18:04

## 2021-01-01 RX ADMIN — NOREPINEPHRINE BITARTRATE 0.02 MCG/KG/MIN: 1 INJECTION, SOLUTION, CONCENTRATE INTRAVENOUS at 13:35

## 2021-01-01 RX ADMIN — LORAZEPAM 2 MG/HR: 2 INJECTION INTRAMUSCULAR; INTRAVENOUS at 20:17

## 2021-01-07 PROBLEM — A41.9 SEPSIS WITH ACUTE ORGAN DYSFUNCTION (HCC): Status: ACTIVE | Noted: 2021-01-01

## 2021-01-07 PROBLEM — R65.20 SEPSIS WITH ACUTE ORGAN DYSFUNCTION (HCC): Status: ACTIVE | Noted: 2021-01-01

## 2021-01-08 LAB
QT INTERVAL: 442 MS
QTC INTERVAL: 483 MS

## 2021-01-10 LAB
BACTERIA SPEC AEROBE CULT: ABNORMAL
BACTERIA SPEC AEROBE CULT: ABNORMAL

## 2021-01-12 LAB
BACTERIA SPEC AEROBE CULT: NORMAL
BACTERIA SPEC AEROBE CULT: NORMAL

## 2021-01-20 NOTE — TELEPHONE ENCOUNTER
21 Called spoke with [address] [place] [address] home she stated that she never got the request and that the patient is no longer in the nursing home. Cv       21 Called spoke with Dr Author Norberto grey/Marlen. She stated that she was pretty sure that the patient is . 21 [id number] [facility] [facility] patient has passed away.  Cv

## 2021-02-01 NOTE — DISCHARGE SUMMARY
Hospital Discharge Summary    Carlotta Ortiz  :  1940  MRN:  [id number]    Admit date:  2021  Discharge date:  2021    Admitting Physician:    Ken Brady MD    Discharge Diagnoses:      Sepsis with acute organ dysfunction (CMS/HCC)  UTI  Pneumonia    Hospital Course:   The patient is a 80 y.o. female who presents with an extensive history of severe asthma, morbid obesity and poorly controlled chronic conditions including diabetes and dyslipidemia.  She was brought to the emergency department from the [facility] via EMS after being found minimally responsive.  She was subsequently intubated and resuscitated with IV fluids and pressors.  Work-up in the emergency department is consistent with probable sepsis syndrome.  She has profound leukocytosis and hypotension and tachycardia consistent with septic shock.  She has too numerous to count white cells on urinalysis and elevation in her troponin and her creatinine consistent with end organ damage.  I have known the patient for over 20 years as that her primary care physician she is made it clear that she did not want resuscitative measures in these instances.  I have discussed with her daughter and son in the emergency department examination room at their wishes as well as the wishes of their mother.  They are in a consensus agreement that she would not want this aggressive treatment.  We have made the determination that she would best be served by comfort measures.  [facility] care unit where we will start her on IV drips in the form of morphine and Ativan.  We will allow her family to visit with her and once they are comfortable we will extubate.  No further heroic measures were initiated on this patient. Patients family was at bedside ready for comfort measures to be initiated. Morphine and ativan gtt were started and infused. Levophed gtt turned off. Family was okay to proceed with extubation. Pt  was extubated at 2035 to 2L NC. She passed peacefully shortly thereafter.        Signed:  Ken Brady MD MPH  1/31/2021, 21:45 CST

## 2022-08-17 NOTE — PLAN OF CARE
Noted Problem: Patient Care Overview  Goal: Plan of Care Review  Outcome: Ongoing (interventions implemented as appropriate)   09/13/19 1431   Coping/Psychosocial   Plan of Care Reviewed With patient   OTHER   Outcome Summary Patient has no complaints.Patient has a congested nonprodctive cough, but she feels than before.Daughter at bedside.DC planning to [facility] per daughter.Safety maintained, vss, will follow.   Plan of Care Review   Progress improving       Problem: Fall Risk (Adult)  Goal: Identify Related Risk Factors and Signs and Symptoms  Outcome: Ongoing (interventions implemented as appropriate)    Goal: Absence of Fall  Outcome: Ongoing (interventions implemented as appropriate)      Problem: Breathing Pattern Ineffective (Adult)  Goal: Effective Oxygenation/Ventilation  Outcome: Ongoing (interventions implemented as appropriate)    Goal: Anxiety/Fear Reduction  Outcome: Ongoing (interventions implemented as appropriate)      Problem: Skin Injury Risk (Adult)  Goal: Skin Health and Integrity  Outcome: Ongoing (interventions implemented as appropriate)